# Patient Record
Sex: FEMALE | Race: WHITE | NOT HISPANIC OR LATINO | Employment: UNEMPLOYED | URBAN - METROPOLITAN AREA
[De-identification: names, ages, dates, MRNs, and addresses within clinical notes are randomized per-mention and may not be internally consistent; named-entity substitution may affect disease eponyms.]

---

## 2017-07-17 ENCOUNTER — HOSPITAL ENCOUNTER (EMERGENCY)
Facility: HOSPITAL | Age: 22
Discharge: HOME | End: 2017-07-17
Attending: EMERGENCY MEDICINE | Admitting: EMERGENCY MEDICINE

## 2017-07-17 ENCOUNTER — APPOINTMENT (OUTPATIENT)
Dept: CT IMAGING | Facility: HOSPITAL | Age: 22
End: 2017-07-17

## 2017-07-17 VITALS
OXYGEN SATURATION: 98 % | BODY MASS INDEX: 40.97 KG/M2 | HEIGHT: 64 IN | HEART RATE: 90 BPM | TEMPERATURE: 98.2 F | WEIGHT: 240 LBS | SYSTOLIC BLOOD PRESSURE: 146 MMHG | DIASTOLIC BLOOD PRESSURE: 73 MMHG | RESPIRATION RATE: 15 BRPM

## 2017-07-17 DIAGNOSIS — K52.9 COLITIS PRESUMED INFECTIOUS: Primary | ICD-10-CM

## 2017-07-17 LAB
ALBUMIN SERPL-MCNC: 4.4 G/DL (ref 3.5–5.2)
ALBUMIN/GLOB SERPL: 1.3 G/DL
ALP SERPL-CCNC: 56 U/L (ref 39–117)
ALT SERPL W P-5'-P-CCNC: 18 U/L (ref 1–33)
ANION GAP SERPL CALCULATED.3IONS-SCNC: 18.9 MMOL/L
AST SERPL-CCNC: 19 U/L (ref 1–32)
BASOPHILS # BLD AUTO: 0.01 10*3/MM3 (ref 0–0.2)
BASOPHILS NFR BLD AUTO: 0.1 % (ref 0–1.5)
BILIRUB SERPL-MCNC: 0.5 MG/DL (ref 0.1–1.2)
BUN BLD-MCNC: 12 MG/DL (ref 6–20)
BUN/CREAT SERPL: 16 (ref 7–25)
CALCIUM SPEC-SCNC: 9.7 MG/DL (ref 8.6–10.5)
CHLORIDE SERPL-SCNC: 106 MMOL/L (ref 98–107)
CO2 SERPL-SCNC: 16.1 MMOL/L (ref 22–29)
CREAT BLD-MCNC: 0.75 MG/DL (ref 0.57–1)
DEPRECATED RDW RBC AUTO: 41.3 FL (ref 37–54)
EOSINOPHIL # BLD AUTO: 0.04 10*3/MM3 (ref 0–0.7)
EOSINOPHIL NFR BLD AUTO: 0.3 % (ref 0.3–6.2)
ERYTHROCYTE [DISTWIDTH] IN BLOOD BY AUTOMATED COUNT: 14.6 % (ref 11.7–13)
GFR SERPL CREATININE-BSD FRML MDRD: 99 ML/MIN/1.73
GLOBULIN UR ELPH-MCNC: 3.3 GM/DL
GLUCOSE BLD-MCNC: 114 MG/DL (ref 65–99)
HCG SERPL QL: NEGATIVE
HCT VFR BLD AUTO: 40.8 % (ref 35.6–45.5)
HGB BLD-MCNC: 13.2 G/DL (ref 11.9–15.5)
HOLD SPECIMEN: NORMAL
HOLD SPECIMEN: NORMAL
IMM GRANULOCYTES # BLD: 0.03 10*3/MM3 (ref 0–0.03)
IMM GRANULOCYTES NFR BLD: 0.2 % (ref 0–0.5)
LIPASE SERPL-CCNC: 30 U/L (ref 13–60)
LYMPHOCYTES # BLD AUTO: 1.16 10*3/MM3 (ref 0.9–4.8)
LYMPHOCYTES NFR BLD AUTO: 7.3 % (ref 19.6–45.3)
MCH RBC QN AUTO: 25.4 PG (ref 26.9–32)
MCHC RBC AUTO-ENTMCNC: 32.4 G/DL (ref 32.4–36.3)
MCV RBC AUTO: 78.6 FL (ref 80.5–98.2)
MONOCYTES # BLD AUTO: 0.99 10*3/MM3 (ref 0.2–1.2)
MONOCYTES NFR BLD AUTO: 6.2 % (ref 5–12)
NEUTROPHILS # BLD AUTO: 13.66 10*3/MM3 (ref 1.9–8.1)
NEUTROPHILS NFR BLD AUTO: 85.9 % (ref 42.7–76)
PLATELET # BLD AUTO: 426 10*3/MM3 (ref 140–500)
PMV BLD AUTO: 11.2 FL (ref 6–12)
POTASSIUM BLD-SCNC: 3.7 MMOL/L (ref 3.5–5.2)
PROT SERPL-MCNC: 7.7 G/DL (ref 6–8.5)
RBC # BLD AUTO: 5.19 10*6/MM3 (ref 3.9–5.2)
SODIUM BLD-SCNC: 141 MMOL/L (ref 136–145)
WBC NRBC COR # BLD: 15.89 10*3/MM3 (ref 4.5–10.7)
WHOLE BLOOD HOLD SPECIMEN: NORMAL

## 2017-07-17 PROCEDURE — 25010000002 HYDROMORPHONE PER 4 MG: Performed by: EMERGENCY MEDICINE

## 2017-07-17 PROCEDURE — 25010000002 PROMETHAZINE PER 50 MG: Performed by: EMERGENCY MEDICINE

## 2017-07-17 PROCEDURE — 84703 CHORIONIC GONADOTROPIN ASSAY: CPT | Performed by: EMERGENCY MEDICINE

## 2017-07-17 PROCEDURE — 74177 CT ABD & PELVIS W/CONTRAST: CPT

## 2017-07-17 PROCEDURE — 96375 TX/PRO/DX INJ NEW DRUG ADDON: CPT

## 2017-07-17 PROCEDURE — 83690 ASSAY OF LIPASE: CPT | Performed by: EMERGENCY MEDICINE

## 2017-07-17 PROCEDURE — 96374 THER/PROPH/DIAG INJ IV PUSH: CPT

## 2017-07-17 PROCEDURE — 96361 HYDRATE IV INFUSION ADD-ON: CPT

## 2017-07-17 PROCEDURE — 0 IOPAMIDOL 61 % SOLUTION: Performed by: EMERGENCY MEDICINE

## 2017-07-17 PROCEDURE — 85025 COMPLETE CBC W/AUTO DIFF WBC: CPT | Performed by: EMERGENCY MEDICINE

## 2017-07-17 PROCEDURE — 25010000002 ONDANSETRON PER 1 MG

## 2017-07-17 PROCEDURE — 80053 COMPREHEN METABOLIC PANEL: CPT | Performed by: EMERGENCY MEDICINE

## 2017-07-17 PROCEDURE — 96376 TX/PRO/DX INJ SAME DRUG ADON: CPT

## 2017-07-17 PROCEDURE — 99284 EMERGENCY DEPT VISIT MOD MDM: CPT

## 2017-07-17 RX ORDER — PROMETHAZINE HYDROCHLORIDE 25 MG/1
25 TABLET ORAL EVERY 6 HOURS PRN
Qty: 20 TABLET | Refills: 0 | Status: SHIPPED | OUTPATIENT
Start: 2017-07-17 | End: 2018-06-27

## 2017-07-17 RX ORDER — PROMETHAZINE HYDROCHLORIDE 25 MG/ML
12.5 INJECTION, SOLUTION INTRAMUSCULAR; INTRAVENOUS ONCE
Status: COMPLETED | OUTPATIENT
Start: 2017-07-17 | End: 2017-07-17

## 2017-07-17 RX ORDER — ONDANSETRON 2 MG/ML
INJECTION INTRAMUSCULAR; INTRAVENOUS
Status: COMPLETED
Start: 2017-07-17 | End: 2017-07-17

## 2017-07-17 RX ORDER — ONDANSETRON 2 MG/ML
4 INJECTION INTRAMUSCULAR; INTRAVENOUS ONCE
Status: COMPLETED | OUTPATIENT
Start: 2017-07-17 | End: 2017-07-17

## 2017-07-17 RX ORDER — SODIUM CHLORIDE 0.9 % (FLUSH) 0.9 %
10 SYRINGE (ML) INJECTION AS NEEDED
Status: DISCONTINUED | OUTPATIENT
Start: 2017-07-17 | End: 2017-07-17 | Stop reason: HOSPADM

## 2017-07-17 RX ORDER — HYDROMORPHONE HYDROCHLORIDE 1 MG/ML
0.5 INJECTION, SOLUTION INTRAMUSCULAR; INTRAVENOUS; SUBCUTANEOUS ONCE
Status: COMPLETED | OUTPATIENT
Start: 2017-07-17 | End: 2017-07-17

## 2017-07-17 RX ORDER — METRONIDAZOLE 500 MG/1
500 TABLET ORAL 3 TIMES DAILY
Qty: 30 TABLET | Refills: 0 | Status: SHIPPED | OUTPATIENT
Start: 2017-07-17 | End: 2018-06-27

## 2017-07-17 RX ADMIN — IOPAMIDOL 85 ML: 612 INJECTION, SOLUTION INTRAVENOUS at 12:23

## 2017-07-17 RX ADMIN — ONDANSETRON 4 MG: 2 INJECTION INTRAMUSCULAR; INTRAVENOUS at 09:56

## 2017-07-17 RX ADMIN — ONDANSETRON 4 MG: 2 INJECTION INTRAMUSCULAR; INTRAVENOUS at 09:35

## 2017-07-17 RX ADMIN — PROMETHAZINE HYDROCHLORIDE 12.5 MG: 25 INJECTION INTRAMUSCULAR; INTRAVENOUS at 13:03

## 2017-07-17 RX ADMIN — PROMETHAZINE HYDROCHLORIDE 12.5 MG: 25 INJECTION INTRAMUSCULAR; INTRAVENOUS at 11:10

## 2017-07-17 RX ADMIN — SODIUM CHLORIDE 1000 ML: 9 INJECTION, SOLUTION INTRAVENOUS at 09:54

## 2017-07-17 RX ADMIN — HYDROMORPHONE HYDROCHLORIDE 0.5 MG: 1 INJECTION, SOLUTION INTRAMUSCULAR; INTRAVENOUS; SUBCUTANEOUS at 09:54

## 2017-07-17 NOTE — ED PROVIDER NOTES
" EMERGENCY DEPARTMENT ENCOUNTER    CHIEF COMPLAINT  Chief Complaint: N/V/D  History given by: Patient  History limited by: N/A  Room Number: 31/31  PMD: No Known Provider      HPI:  Pt is a 21 y.o. female who presents complaining of nausea and diarrhea onset 1 month ago. Pt reports vomiting onset last night and intermittent moderate epigastric abdominal pain. Pt describes the abdominal pain as \"cramping\". Pt reports a cough productive of clear phlegm and diarrhea x10 that increased last night. Pt denies fever, CP, SOA, and black/bloody stool.  Pt denies any sick contact. LMP 6/15/2017 Pt denies any chance of pregnancy.     Duration: 1 month, symptoms worsening last night  Onset: Gradual  Timing: Constant  Location: GI  Radiation: Does not radiate  Quality: \"N/V/D\"  Intensity/Severity: Moderate  Progression: Worsening  Associated Symptoms: Cough productive of clear phlegm, diarrhea x10, N/V  Aggravating Factors: None  Alleviating Factors: None  Previous Episodes: None  Treatment before arrival: None    PAST MEDICAL HISTORY  Active Ambulatory Problems     Diagnosis Date Noted   • PCOS (polycystic ovarian syndrome) 05/06/2016   • Hirsutism 05/06/2016   • Abnormal uterine bleeding (AUB) 05/06/2016   • Obesity 05/06/2016     Resolved Ambulatory Problems     Diagnosis Date Noted   • No Resolved Ambulatory Problems     Past Medical History:   Diagnosis Date   • Anemia    • Anxiety    • Asthma    • Depression    • Dizziness    • Multiple URI    • PCOS (polycystic ovarian syndrome) 5/6/2016   • Pneumonia    • Viral infection        PAST SURGICAL HISTORY  Past Surgical History:   Procedure Laterality Date   • VAGINAL SEPTUM RESECTION      Excision   • WISDOM TOOTH EXTRACTION  02/2016       FAMILY HISTORY  Family History   Problem Relation Age of Onset   • Anxiety disorder Mother    • Depression Mother    • Hypertension Maternal Grandmother    • Hyperlipidemia Maternal Grandmother    • Other Maternal Grandmother      GERD   • " "Breast cancer Neg Hx    • Ovarian cancer Neg Hx    • Uterine cancer Neg Hx    • Colon cancer Neg Hx        SOCIAL HISTORY  Social History     Social History   • Marital status: Single     Spouse name: N/A   • Number of children: N/A   • Years of education: N/A     Occupational History   • Not on file.     Social History Main Topics   • Smoking status: Never Smoker   • Smokeless tobacco: Never Used   • Alcohol use No   • Drug use: No   • Sexual activity: Defer     Other Topics Concern   • Not on file     Social History Narrative   • No narrative on file       ALLERGIES  Review of patient's allergies indicates no known allergies.    REVIEW OF SYSTEMS  Review of Systems   Constitutional: Negative.  Negative for unexpected weight change.   HENT: Negative.    Respiratory: Positive for cough (productive of clear phlegm). Negative for shortness of breath.    Cardiovascular: Negative.    Gastrointestinal: Positive for abdominal pain (\"cramping\", intermittent, epigastric), diarrhea (x10 in the past 24 hours), nausea and vomiting. Negative for blood in stool.   Genitourinary: Negative.    Musculoskeletal: Negative.    Neurological: Negative.    All other systems reviewed and are negative.      PHYSICAL EXAM  ED Triage Vitals   Temp Heart Rate Resp BP SpO2   07/17/17 0856 07/17/17 0856 07/17/17 0856 07/17/17 0929 07/17/17 0856   98.2 °F (36.8 °C) 113 20 143/90 95 %      Temp src Heart Rate Source Patient Position BP Location FiO2 (%)   07/17/17 0856 -- -- -- --   Tympanic           Physical Exam   Constitutional: She is oriented to person, place, and time and well-developed, well-nourished, and in no distress.   HENT:   Mouth/Throat: Mucous membranes are dry.   Eyes: EOM are normal.   Neck: Normal range of motion.   Cardiovascular: Normal rate and regular rhythm.    Pulmonary/Chest: Effort normal and breath sounds normal. No respiratory distress.   Abdominal: There is tenderness (upper).   Lymphadenopathy:     She has cervical " adenopathy (L sided).   Neurological: She is alert and oriented to person, place, and time. She has normal sensation and normal strength.   Skin: Skin is warm and dry.   Psychiatric: Affect normal.   Nursing note and vitals reviewed.      LAB RESULTS  Lab Results (last 24 hours)     Procedure Component Value Units Date/Time    CBC & Differential [[id number]] Collected:  07/17/17 0933    Specimen:  Blood Updated:  07/17/17 0947    Narrative:       The following orders were created for panel order CBC & Differential.  Procedure                               Abnormality         Status                     ---------                               -----------         ------                     CBC Auto Differential[[id number]]        Abnormal            Final result                 Please view results for these tests on the individual orders.    Comprehensive Metabolic Panel [[id number]]  (Abnormal) Collected:  07/17/17 0933    Specimen:  Blood Updated:  07/17/17 1005     Glucose 114 (H) mg/dL      BUN 12 mg/dL      Creatinine 0.75 mg/dL      Sodium 141 mmol/L      Potassium 3.7 mmol/L      Chloride 106 mmol/L      CO2 16.1 (L) mmol/L      Calcium 9.7 mg/dL      Total Protein 7.7 g/dL      Albumin 4.40 g/dL      ALT (SGPT) 18 U/L      AST (SGOT) 19 U/L      Alkaline Phosphatase 56 U/L      Total Bilirubin 0.5 mg/dL      eGFR Non African Amer 99 mL/min/1.73      Globulin 3.3 gm/dL      A/G Ratio 1.3 g/dL      BUN/Creatinine Ratio 16.0     Anion Gap 18.9 mmol/L     Lipase [[id number]]  (Normal) Collected:  07/17/17 0933    Specimen:  Blood Updated:  07/17/17 1005     Lipase 30 U/L     hCG, Serum, Qualitative [[id number]]  (Normal) Collected:  07/17/17 0933    Specimen:  Blood Updated:  07/17/17 1008     HCG Qualitative Negative    CBC Auto Differential [[id number]]  (Abnormal) Collected:  07/17/17 0933    Specimen:  Blood Updated:  07/17/17 0947     WBC 15.89 (H) 10*3/mm3      RBC 5.19 10*6/mm3      Hemoglobin 13.2 g/dL       Hematocrit 40.8 %      MCV 78.6 (L) fL      MCH 25.4 (L) pg      MCHC 32.4 g/dL      RDW 14.6 (H) %      RDW-SD 41.3 fl      MPV 11.2 fL      Platelets 426 10*3/mm3      Neutrophil % 85.9 (H) %      Lymphocyte % 7.3 (L) %      Monocyte % 6.2 %      Eosinophil % 0.3 %      Basophil % 0.1 %      Immature Grans % 0.2 %      Neutrophils, Absolute 13.66 (H) 10*3/mm3      Lymphocytes, Absolute 1.16 10*3/mm3      Monocytes, Absolute 0.99 10*3/mm3      Eosinophils, Absolute 0.04 10*3/mm3      Basophils, Absolute 0.01 10*3/mm3      Immature Grans, Absolute 0.03 10*3/mm3           I ordered the above labs and reviewed the results.    RADIOLOGY  CT Abdomen Pelvis With Contrast    (Results Pending)      CT Abdomen/Pelvis - shows enterocolitis of unclear etiology. Interpreted by radiologist and discussed with me.    I ordered the above noted radiological studies. Interpreted by radiologist. Discussed with radiologist (Dr. Olson at 1241). Reviewed by me in PACS.       PROCEDURES  Procedures      PROGRESS AND CONSULTS  ED Course   Comment By Time   9:49 AM  22 yo with NVD and abd cramps worse since last night.  Exam - mild TTP upper abd  Plan - IVF, IV DZ for pain/nausea. Labs. Axel Estevez MD 07/17 0950     9:45 AM:  Vitals: BP: 148/89 HR: 99 Temp: 98.2 °F (36.8 °C) (Tympanic) O2 sat: 100%  D/w pt plan for IV fluids for hydration, Zofran and Dilaudid for pain and nausea, and labs for further evaluation.    11:09 AM:  Vitals: BP: 127/75 HR: 80 Temp: 98.2 °F (36.8 °C) (Tympanic) O2 sat: 99%  Rechecked pt. Pt is resting comfortably. Discussed with pt plan for abdominal/pelvic CT for further evaluation.     12:45 PM:  Vitals: BP: 131/78 HR: 77 Temp: 98.2 °F (36.8 °C) (Tympanic) O2 sat: 100%  Rechecked pt. Pt is resting comfortably. Discussed with pt test results showing enterocolitis. Discussed with pt plan for discharge. Pt understands and agrees with the plan, all questions answered.      MEDICAL DECISION MAKING  Results were  reviewed/discussed with the patient and they were also made aware of online access. Pt also made aware that some labs, such as cultures, will not be resulted during ER visit and follow up with PMD is necessary.     MDM  Number of Diagnoses or Management Options     Amount and/or Complexity of Data Reviewed  Clinical lab tests: ordered and reviewed  Tests in the radiology section of CPT®: ordered and reviewed  Decide to obtain previous medical records or to obtain history from someone other than the patient: yes           DIAGNOSIS  Final diagnoses:   Colitis presumed infectious       DISPOSITION  1252- DISCHARGE    Patient discharged in stable condition.    Reviewed implications of results, diagnosis, meds, responsibility to follow up, warning signs and symptoms of possible worsening, potential complications and reasons to return to ER.    Patient/Family voiced understanding of above instructions.    Discussed plan for discharge, as there is no emergent indication for admission.  Pt/family is agreeable and understands need for follow up and repeat testing.  Pt is aware that discharge does not mean that nothing is wrong but it indicates no emergency is present that requires admission and they must continue care with follow-up as given below or physician of their choice.     FOLLOW-UP  ARIANA Franklin  [address] [place] [id number] [phone]               Medication List      New Prescriptions          metroNIDAZOLE 500 MG tablet   Commonly known as:  FLAGYL   Take 1 tablet by mouth 3 (Three) Times a Day.       promethazine 25 MG tablet   Commonly known as:  PHENERGAN   Take 1 tablet by mouth Every 6 (Six) Hours As Needed for Nausea or   Vomiting.         Stop          amitriptyline 10 MG tablet   Commonly known as:  ELAVIL       budesonide-formoterol 160-4.5 MCG/ACT inhaler   Commonly known as:  SYMBICORT       Cetirizine HCl 10 MG capsule       Fluticasone Furoate-Vilanterol 100-25 MCG/INH  aerosol powder        Iron 325 (65 FE) MG tablet       norgestimate-ethinyl estradiol 0.25-35 MG-MCG per tablet   Commonly known as:  ORTHO-CYCLEN       omeprazole 20 MG capsule   Commonly known as:  priLOSEC       ondansetron ODT 4 MG disintegrating tablet   Commonly known as:  ZOFRAN-ODT       Triamcinolone Acetonide 55 MCG/ACT nasal inhaler   Commonly known as:  NASACORT       Vitamin B-12 5000 MCG sublingual tablet       Vitamin C 500 MG capsule       vitamin D3 5000 UNITS tablet tablet           Latest Documented Vital Signs:  As of 12:53 PM  BP- 146/73 HR- 90 Temp- 98.2 °F (36.8 °C) (Tympanic) O2 sat- 98%    --  Documentation assistance provided by karrie Carrera for Axel Estevez MD.  Information recorded by the scribe was done at my direction and has been verified and validated by me.         Steve Carrera  07/17/17 4170       Axel Estevez MD  07/17/17 0577

## 2018-06-27 ENCOUNTER — OFFICE VISIT (OUTPATIENT)
Dept: FAMILY MEDICINE CLINIC | Facility: CLINIC | Age: 23
End: 2018-06-27

## 2018-06-27 VITALS
SYSTOLIC BLOOD PRESSURE: 142 MMHG | DIASTOLIC BLOOD PRESSURE: 80 MMHG | OXYGEN SATURATION: 96 % | BODY MASS INDEX: 33.39 KG/M2 | TEMPERATURE: 98.8 F | HEIGHT: 64 IN | HEART RATE: 94 BPM | WEIGHT: 195.6 LBS

## 2018-06-27 DIAGNOSIS — R19.7 DIARRHEA, UNSPECIFIED TYPE: ICD-10-CM

## 2018-06-27 DIAGNOSIS — R11.2 NON-INTRACTABLE VOMITING WITH NAUSEA, UNSPECIFIED VOMITING TYPE: ICD-10-CM

## 2018-06-27 DIAGNOSIS — R10.13 EPIGASTRIC PAIN: Primary | ICD-10-CM

## 2018-06-27 DIAGNOSIS — K92.0 HEMATEMESIS WITH NAUSEA: ICD-10-CM

## 2018-06-27 DIAGNOSIS — F41.8 DEPRESSION WITH ANXIETY: ICD-10-CM

## 2018-06-27 DIAGNOSIS — E28.2 PCOS (POLYCYSTIC OVARIAN SYNDROME): ICD-10-CM

## 2018-06-27 PROCEDURE — 99214 OFFICE O/P EST MOD 30 MIN: CPT | Performed by: PHYSICIAN ASSISTANT

## 2018-06-27 RX ORDER — ONDANSETRON 4 MG/1
4 TABLET, ORALLY DISINTEGRATING ORAL
COMMUNITY
Start: 2016-05-10 | End: 2018-06-27 | Stop reason: SDUPTHER

## 2018-06-27 RX ORDER — PAROXETINE 10 MG/1
10 TABLET, FILM COATED ORAL EVERY MORNING
Qty: 30 TABLET | Refills: 0 | Status: SHIPPED | OUTPATIENT
Start: 2018-06-27 | End: 2018-08-14 | Stop reason: SDUPTHER

## 2018-06-27 RX ORDER — CALCIUM CARBONATE 200(500)MG
1 TABLET,CHEWABLE ORAL DAILY
COMMUNITY
End: 2019-12-16

## 2018-06-27 NOTE — PROGRESS NOTES
"Subjective   Alicia Powell is a 22 y.o. female. Patient seen to establish care, vomiting for the last 3 days, anxiety     History of Present Illness     LIVING IN [place] WITH 2 BEST FRIENDS. WAS LIVING WITH SOMEONE ELSE AND THEY MOVED DUE TO INCOMPATIBILITY. WAS WORKING AT [employer] FOR ABOUT 6 MONTHS AND QUIT WHEN MOVED.     STARTED ABOUT 3 DAYS AGO- WENT TO LUNCH AND A FEW HOURS LATER, INTRACTABLE VOMITING AND BURNING PAIN IN EPIGASTRIC AREA UP MID CHEST. FEELING THAT NEEDED TO VOMIT BUT NOTHING LEFT. GAVE IV FLUIDS AND FELT BETTER BUT ON THE WAY HOME YESTERDAY, INCREASED PAIN AND VOMITING AND THROUGH THE NIGHT THE SAME. TOOK A COUPLE PROTONIX BUT WAS SCARED WAS INDUCING VOMITING. WAS TAKING AND STOPPED AND HAS BEEN TAKING ZOFRAN. VOMITING WITH BLEEDING \"TOWARDS THE END\"- CLOTS OF BLOOD. LAST WAS LAST NIGHT BUT WAS ABLE TO GET VOMITING STOPPED. LAST VOMITING WAS 6 AM.     X 2 YEARS- HAS BEEN HAVING EPISODES OF VOMITING AND DIARRHEA. CALLED IN TO WORK FOR IT. HAS BEEN TO ER MULTIPLE TIMES FOR SAME COMPLAINTS. WAS TO SEE DR PETERS BUT DID NOT GO. INSURANCE LAPSED.     HAD DIARRHEA INITIALLY BUT NOT ANY LONGER.   FELT HOT BUT NO DOCUMENTED FEVER. LAST BM EARLY THIS AM- WAS NORMAL BUT DECREASED AMOUNT.     INCREASED URINARY FREQUENCY LAST NIGHT WITH INCREASING FLUIDS- ALSO DRINKING PEDIALYTE. NO DYSURIA. NO POST VOID URGENCY. NO BLOOD IN URINE. NO VAGINAL D/C, ITCHING ODOR. SA- 4 PARTNERS LIFETIME. 1 PARTNER CURRENTLY X 6 YEARS. HAD OTHER PARTNERS FOR MONTHS IN BETWEEN. CONDOM USE EVERY TIME. NO PAIN OR BLEEDING WITH INTERCOURSE.     POSSIBLE CONTRACEPTION COUNSELING. HAS APPT WITH [facility]. NOT SURE WHAT SHE WOULD LIKE TO DO. LAST TIME, THEY SUSPECTED PCOS- HAS STARTED NOTICING SYMPTOMS. HAS STARTED HAIR GROWTH CHEST AND CHIN. MENSES HAVE BEEN NORMAL TIMING. WEIGHT LOSS - 65 LBS TOTAL. WAS TRYING BUT FOR SOME OF IT BUT HAS HAD INCREASED STRESS AND DEPRESSION AND ANXIETY. FATHER  AND HAS BEEN " DIFFICULT.     WAS ON ZOLOFT AT AGE 16- HOSPITALIZED AT [facility] FOR HARMFUL THOUGHTS. WAS HOSPITALIZED FOR 1 WEEK. FELT LIKE IT MADE EVERYTHING WORSE. MADE WANT TO DIE MORE. NO OTHER TREATMENT FOR ANXIETY OR DEPRESSION.     HAS UPS AND DOWNS- HAS BEEN LOOKING INTO BORDERLINE PERSONALITY DISORDER. VIOLENT RAGE EPISODES- CAN BE OK AND 1 THING WILL SET OFF AND IS A DIFFERENT THINGS. RECOGNIZES DURING OR AFTER. NOT SURE HOW TO FIX IT. IS OCCURRING EVERY DAY. SINCE FATHER'S DEATH, HAS BEEN WORSE. DEPRESSION HAS GOTTEN WORSE. SUICIDAL THOUGHTS- STARTED RIGHT AFTER FATHER'S DEATH. FRIEND THAT LIVES WITH HER HAS HELPED KEEP SAFE. IS A REALLY GOOD PERSON. HAS VERY GRUESOME VISUAL IMAGES, SLAMMING HEAD INTO PAVEMENT, CUTTING WITH DULL OBJECTS- LAST WAS 2-3 MONTHS AGO. HAS TRIED TO HARM SELF WITH KNIVES. HAD IT READY BUT DIDN'T FOLLOW THROUGH. THOUGHT ABOUT DRINKING BLEACH AND WAS CONCERNED ABOUT WHAT IT WOULD DO TO STOMACH AND ESOPHAGUS.     INCREASED CRYING. CRIES OUT OF NOWHERE.     The following portions of the patient's history were reviewed and updated as appropriate: allergies, current medications, past family history, past medical history, past social history, past surgical history and problem list.    Review of Systems   Gastrointestinal: Positive for vomiting.   Psychiatric/Behavioral:        Anxiety    All other systems reviewed and are negative.      Objective   Physical Exam   Constitutional: She is oriented to person, place, and time. She appears well-developed and well-nourished.   HENT:   Head: Normocephalic and atraumatic.   Right Ear: External ear normal.   Left Ear: External ear normal.   Nose: Nose normal.   Eyes: Conjunctivae are normal.   Cardiovascular: Normal rate, regular rhythm, normal heart sounds and intact distal pulses.  Exam reveals no gallop and no friction rub.    No murmur heard.  Pulmonary/Chest: Effort normal and breath sounds normal. No respiratory distress. She has no wheezes. She has no  rhonchi. She has no rales.   Abdominal: Soft. Normal appearance and bowel sounds are normal. She exhibits no shifting dullness, no distension, no abdominal bruit and no mass. There is no hepatosplenomegaly. There is tenderness in the epigastric area. There is no rigidity, no rebound, no guarding and no CVA tenderness. No hernia.   Neurological: She is alert and oriented to person, place, and time.   Skin: Skin is warm and dry. She is not diaphoretic.   Psychiatric: She has a normal mood and affect. Her behavior is normal. Judgment and thought content normal.   Nursing note and vitals reviewed.      Assessment/Plan   Alicia was seen today for establish care, vomiting and anxiety.    Diagnoses and all orders for this visit:    Epigastric pain  -     CBC & Differential  -     Comprehensive Metabolic Panel  -     Amylase  -     Lipase  -     Hepatitis Panel, Acute  -     Thyroid Panel With TSH  -     Iron and TIBC  -     Ferritin  -     Helicobacter Pylori, IgA IgG IgM  -     US Gallbladder  -     Ambulatory Referral to Gastroenterology    Non-intractable vomiting with nausea, unspecified vomiting type  -     CBC & Differential  -     Comprehensive Metabolic Panel  -     Amylase  -     Lipase  -     Hepatitis Panel, Acute  -     Thyroid Panel With TSH  -     Iron and TIBC  -     Ferritin  -     Helicobacter Pylori, IgA IgG IgM  -     US Gallbladder  -     Ambulatory Referral to Gastroenterology    Diarrhea, unspecified type  -     CBC & Differential  -     Comprehensive Metabolic Panel  -     Amylase  -     Lipase  -     Hepatitis Panel, Acute  -     Thyroid Panel With TSH  -     Iron and TIBC  -     Ferritin  -     Helicobacter Pylori, IgA IgG IgM  -     US Gallbladder  -     Ambulatory Referral to Gastroenterology    Hematemesis with nausea  -     CBC & Differential  -     Comprehensive Metabolic Panel  -     Amylase  -     Lipase  -     Hepatitis Panel, Acute  -     Thyroid Panel With TSH  -     Iron and TIBC  -      Ferritin  -     Helicobacter Pylori, IgA IgG IgM  -     US Gallbladder  -     Ambulatory Referral to Gastroenterology    PCOS (polycystic ovarian syndrome)    Depression with anxiety  -     Thyroid Panel With TSH  -     PARoxetine (PAXIL) 10 MG tablet; Take 1 tablet by mouth Every Morning.  -     Ambulatory Referral to Psychology      Patient Instructions   22 YEAR OLD FEMALE WHO PRESENTS TODAY TO RE-ESTABLISH CARE. I HAVE NOT SEEN PATIENT SINCE 2014. SHE REPORTS SHE DID NOT SEE GI WHEN REFERRED IN THE PAST. PATIENT AND MOTHER REPORT SHE HAS CONTINUED WITH INTERMITTENT ABDOMINAL PAIN, VOMITING AND DIARRHEA. SHE REPORTS MOST RECENT EPISODE STARTED 3 DAYS AGO WITH SEVERE ABDOMINAL PAIN AND VOMITING A COUPLE HOURS AFTER EATING. SHE WAS SEEN AT [facility] ER AND HAD NEGATIVE CT ABD/PELVIS. SHE WAS D/C WITH ZOFRAN ODT, PHENERGAN SUPPOSITORIES, AND PROTONIX. SHE HAS CONTINUED WITH INTERMITTENT SYMPTOMS X 3 DAYS. PATIENT WITH VOMITING AND ABDOMINAL PAIN TODAY. SHE HAS EPIGASTRIC TENDERNESS ON PALPATION BUT NO OTHER SIGNIFICANT TENDERNESS. I WILL CHECK LABS TODAY AND REFER FOR GB US WITH PAIN STARTING A FEW HOURS AFTER EATING. I ADVISED TO RESTART PROTONIX 40 MG ONCE DAILY. TO ER ASAP IF WORSENING SYMPTOMS PRIOR TO RESULTS OF LABS AND IMAGING.     SHE HAS ALSO HAD SIGNIFICANT DEPRESSION AND ANXIETY AND HAS NOT BEEN TREATED. SHE REPORTS SYMPTOMS WORSENED MONTHS AGO AFTER HER FATHER'S DEATH. SHE HAS HAD SUICIDAL THOUGHTS BUT REPORTS SHE HAS A GOOD SUPPORT SYSTEM AND DOES NOT HAVE CURRENT PLAN OR INTENTION TO HARM HERSELF. SHE HAS HAD PLAN IN THE PAST. PATIENT REPORTS SHE DID NOT DO WELL ON ZOLOFT AS A TEEN, REPORTING IT WORSENED HER DEPRESSIVE SYMPTOMS. HER MOTHER AND GRANDMOTHER DID WELL ON PAXIL IN THE PAST. I HAVE DISCUSSED THE NEED FOR COUNSELING- SHE WOULD BE BEST TREATED WITH IOP AT THE [place] OR [facility]. SHE IS WILLING TO GO FOR INTAKE TO GET INTO INTENSIVE PROGRAM. HOWEVER, SHE WOULD LIKE REFERRAL TO PSYCHOLOGY. I  WILL REFER TODAY. I WILL HAVE HER START PAXIL 10 MG ONCE DAILY. PATIENT VERBALLY CONTRACTS FOR SAFETY- ENSURING TO CALL 9-1-1 TO ER IF DEVELOPS SI/HI, OR TO BE SEEN ASAP IF WORSENING MOODS. PATIENT AND MOTHER VERBALIZED UNDERSTANDING.     SHE NEEDS TO SEE GYN FOR CONTRACEPTION. SHE HAD QUESTIONABLE PCOS AND HAS HIRSUTISM AND PMDD. I WILL REFER TO GYN.     FOLLOW UP WITH ME IN 2-4 WEEKS FOR RE-EVALUATION OF MOODS.

## 2018-06-28 ENCOUNTER — APPOINTMENT (OUTPATIENT)
Dept: CT IMAGING | Facility: HOSPITAL | Age: 23
End: 2018-06-28

## 2018-06-28 ENCOUNTER — HOSPITAL ENCOUNTER (EMERGENCY)
Facility: HOSPITAL | Age: 23
Discharge: HOME | End: 2018-06-29
Attending: EMERGENCY MEDICINE | Admitting: EMERGENCY MEDICINE

## 2018-06-28 DIAGNOSIS — N39.0 ACUTE UTI: Primary | ICD-10-CM

## 2018-06-28 LAB
ALBUMIN SERPL-MCNC: 4.7 G/DL (ref 3.5–5.2)
ALBUMIN SERPL-MCNC: 4.8 G/DL (ref 3.5–5.2)
ALBUMIN/GLOB SERPL: 1.7 G/DL
ALBUMIN/GLOB SERPL: 1.8 G/DL
ALP SERPL-CCNC: 55 U/L (ref 39–117)
ALP SERPL-CCNC: 56 U/L (ref 39–117)
ALT SERPL W P-5'-P-CCNC: 13 U/L (ref 1–33)
ALT SERPL-CCNC: 13 U/L (ref 1–33)
AMYLASE SERPL-CCNC: 232 U/L (ref 28–100)
ANION GAP SERPL CALCULATED.3IONS-SCNC: 16.4 MMOL/L
AST SERPL-CCNC: 10 U/L (ref 1–32)
AST SERPL-CCNC: 14 U/L (ref 1–32)
BACTERIA UR QL AUTO: ABNORMAL /HPF
BASOPHILS # BLD AUTO: 0.02 10*3/MM3 (ref 0–0.2)
BASOPHILS # BLD AUTO: 0.03 10*3/MM3 (ref 0–0.2)
BASOPHILS NFR BLD AUTO: 0.2 % (ref 0–1.5)
BASOPHILS NFR BLD AUTO: 0.2 % (ref 0–1.5)
BILIRUB SERPL-MCNC: 0.5 MG/DL (ref 0.1–1.2)
BILIRUB SERPL-MCNC: 0.6 MG/DL (ref 0.1–1.2)
BILIRUB UR QL STRIP: NEGATIVE
BUN BLD-MCNC: 11 MG/DL (ref 6–20)
BUN SERPL-MCNC: 9 MG/DL (ref 6–20)
BUN/CREAT SERPL: 11.4 (ref 7–25)
BUN/CREAT SERPL: 12.8 (ref 7–25)
CALCIUM SERPL-MCNC: 10.2 MG/DL (ref 8.6–10.5)
CALCIUM SPEC-SCNC: 9.7 MG/DL (ref 8.6–10.5)
CHLORIDE SERPL-SCNC: 101 MMOL/L (ref 98–107)
CHLORIDE SERPL-SCNC: 99 MMOL/L (ref 98–107)
CLARITY UR: ABNORMAL
CO2 SERPL-SCNC: 20.6 MMOL/L (ref 22–29)
CO2 SERPL-SCNC: 24.5 MMOL/L (ref 22–29)
COLOR UR: YELLOW
CREAT BLD-MCNC: 0.86 MG/DL (ref 0.57–1)
CREAT SERPL-MCNC: 0.79 MG/DL (ref 0.57–1)
D-LACTATE SERPL-SCNC: 1.6 MMOL/L (ref 0.5–2)
DEPRECATED RDW RBC AUTO: 41.6 FL (ref 37–54)
EOSINOPHIL # BLD AUTO: 0.01 10*3/MM3 (ref 0–0.7)
EOSINOPHIL # BLD AUTO: 0.07 10*3/MM3 (ref 0–0.7)
EOSINOPHIL NFR BLD AUTO: 0.1 % (ref 0.3–6.2)
EOSINOPHIL NFR BLD AUTO: 0.5 % (ref 0.3–6.2)
ERYTHROCYTE [DISTWIDTH] IN BLOOD BY AUTOMATED COUNT: 15.2 % (ref 11.7–13)
ERYTHROCYTE [DISTWIDTH] IN BLOOD BY AUTOMATED COUNT: 15.8 % (ref 11.7–13)
FERRITIN SERPL-MCNC: 4.59 NG/ML (ref 13–150)
FT4I SERPL CALC-MCNC: 2.3 (ref 1.2–4.9)
GFR SERPL CREATININE-BSD FRML MDRD: 83 ML/MIN/1.73
GLOBULIN SER CALC-MCNC: 2.7 GM/DL
GLOBULIN UR ELPH-MCNC: 2.8 GM/DL
GLUCOSE BLD-MCNC: 100 MG/DL (ref 65–99)
GLUCOSE SERPL-MCNC: 84 MG/DL (ref 65–99)
GLUCOSE UR STRIP-MCNC: NEGATIVE MG/DL
H PYLORI IGA SER-ACNC: <9 UNITS (ref 0–8.9)
H PYLORI IGG SER IA-ACNC: 0.55 INDEX VALUE (ref 0–0.79)
H PYLORI IGM SER-ACNC: <9 UNITS (ref 0–8.9)
HAV IGM SERPL QL IA: NEGATIVE
HBV CORE IGM SERPL QL IA: NEGATIVE
HBV SURFACE AG SERPL QL IA: NEGATIVE
HCG SERPL QL: NEGATIVE
HCT VFR BLD AUTO: 38.8 % (ref 35.6–45.5)
HCT VFR BLD AUTO: 39.9 % (ref 35.6–45.5)
HCV AB S/CO SERPL IA: 0.1 S/CO RATIO (ref 0–0.9)
HGB BLD-MCNC: 12.1 G/DL (ref 11.9–15.5)
HGB BLD-MCNC: 12.6 G/DL (ref 11.9–15.5)
HGB UR QL STRIP.AUTO: NEGATIVE
HOLD SPECIMEN: NORMAL
HOLD SPECIMEN: NORMAL
HYALINE CASTS UR QL AUTO: ABNORMAL /LPF
IMM GRANULOCYTES # BLD: 0.03 10*3/MM3 (ref 0–0.03)
IMM GRANULOCYTES # BLD: 0.03 10*3/MM3 (ref 0–0.03)
IMM GRANULOCYTES NFR BLD: 0.2 % (ref 0–0.5)
IMM GRANULOCYTES NFR BLD: 0.2 % (ref 0–0.5)
INR PPP: 1.1 (ref 0.9–1.1)
IRON SATN MFR SERPL: 4 % (ref 20–50)
IRON SERPL-MCNC: 21 MCG/DL (ref 37–145)
KETONES UR QL STRIP: ABNORMAL
LEUKOCYTE ESTERASE UR QL STRIP.AUTO: ABNORMAL
LIPASE SERPL-CCNC: 246 U/L (ref 13–60)
LIPASE SERPL-CCNC: 46 U/L (ref 13–60)
LYMPHOCYTES # BLD AUTO: 1.44 10*3/MM3 (ref 0.9–4.8)
LYMPHOCYTES # BLD AUTO: 3.84 10*3/MM3 (ref 0.9–4.8)
LYMPHOCYTES NFR BLD AUTO: 11.1 % (ref 19.6–45.3)
LYMPHOCYTES NFR BLD AUTO: 30.1 % (ref 19.6–45.3)
MCH RBC QN AUTO: 23.7 PG (ref 26.9–32)
MCH RBC QN AUTO: 23.9 PG (ref 26.9–32)
MCHC RBC AUTO-ENTMCNC: 31.2 G/DL (ref 32.4–36.3)
MCHC RBC AUTO-ENTMCNC: 31.6 G/DL (ref 32.4–36.3)
MCV RBC AUTO: 75.1 FL (ref 80.5–98.2)
MCV RBC AUTO: 76.7 FL (ref 80.5–98.2)
MONOCYTES # BLD AUTO: 0.6 10*3/MM3 (ref 0.2–1.2)
MONOCYTES # BLD AUTO: 1.05 10*3/MM3 (ref 0.2–1.2)
MONOCYTES NFR BLD AUTO: 4.6 % (ref 5–12)
MONOCYTES NFR BLD AUTO: 8.2 % (ref 5–12)
MUCOUS THREADS URNS QL MICRO: ABNORMAL /HPF
NEUTROPHILS # BLD AUTO: 10.94 10*3/MM3 (ref 1.9–8.1)
NEUTROPHILS # BLD AUTO: 7.78 10*3/MM3 (ref 1.9–8.1)
NEUTROPHILS NFR BLD AUTO: 61 % (ref 42.7–76)
NEUTROPHILS NFR BLD AUTO: 84 % (ref 42.7–76)
NITRITE UR QL STRIP: NEGATIVE
NRBC BLD MANUAL-RTO: 0 /100 WBC (ref 0–0)
PH UR STRIP.AUTO: 8.5 [PH] (ref 5–8)
PLATELET # BLD AUTO: 477 10*3/MM3 (ref 140–500)
PLATELET # BLD AUTO: 478 10*3/MM3 (ref 140–500)
PMV BLD AUTO: 11.4 FL (ref 6–12)
POTASSIUM BLD-SCNC: 3.7 MMOL/L (ref 3.5–5.2)
POTASSIUM SERPL-SCNC: 3.7 MMOL/L (ref 3.5–5.2)
PROT SERPL-MCNC: 7.5 G/DL (ref 6–8.5)
PROT SERPL-MCNC: 7.5 G/DL (ref 6–8.5)
PROT UR QL STRIP: ABNORMAL
PROTHROMBIN TIME: 14 SECONDS (ref 11.7–14.2)
RBC # BLD AUTO: 5.06 10*6/MM3 (ref 3.9–5.2)
RBC # BLD AUTO: 5.31 10*6/MM3 (ref 3.9–5.2)
RBC # UR: ABNORMAL /HPF
REF LAB TEST METHOD: ABNORMAL
SODIUM BLD-SCNC: 138 MMOL/L (ref 136–145)
SODIUM SERPL-SCNC: 139 MMOL/L (ref 136–145)
SP GR UR STRIP: >=1.03 (ref 1–1.03)
SQUAMOUS #/AREA URNS HPF: ABNORMAL /HPF
T3RU NFR SERPL: 29 % (ref 24–39)
T4 SERPL-MCNC: 7.9 UG/DL (ref 4.5–12)
TIBC SERPL-MCNC: 477 MCG/DL
TSH SERPL DL<=0.005 MIU/L-ACNC: 1.86 UIU/ML (ref 0.45–4.5)
UIBC SERPL-MCNC: 456 MCG/DL
UROBILINOGEN UR QL STRIP: ABNORMAL
WBC # BLD AUTO: 12.76 10*3/MM3 (ref 4.5–10.7)
WBC NRBC COR # BLD: 13.02 10*3/MM3 (ref 4.5–10.7)
WBC UR QL AUTO: ABNORMAL /HPF
WHOLE BLOOD HOLD SPECIMEN: NORMAL
WHOLE BLOOD HOLD SPECIMEN: NORMAL

## 2018-06-28 PROCEDURE — 25010000002 ONDANSETRON PER 1 MG: Performed by: EMERGENCY MEDICINE

## 2018-06-28 PROCEDURE — 96361 HYDRATE IV INFUSION ADD-ON: CPT

## 2018-06-28 PROCEDURE — 84703 CHORIONIC GONADOTROPIN ASSAY: CPT | Performed by: EMERGENCY MEDICINE

## 2018-06-28 PROCEDURE — 96375 TX/PRO/DX INJ NEW DRUG ADDON: CPT

## 2018-06-28 PROCEDURE — 25010000002 HYDROMORPHONE PER 4 MG: Performed by: EMERGENCY MEDICINE

## 2018-06-28 PROCEDURE — 83605 ASSAY OF LACTIC ACID: CPT | Performed by: EMERGENCY MEDICINE

## 2018-06-28 PROCEDURE — 25010000002 IOPAMIDOL 61 % SOLUTION: Performed by: EMERGENCY MEDICINE

## 2018-06-28 PROCEDURE — 25010000002 CEFTRIAXONE PER 250 MG: Performed by: EMERGENCY MEDICINE

## 2018-06-28 PROCEDURE — 74177 CT ABD & PELVIS W/CONTRAST: CPT

## 2018-06-28 PROCEDURE — 96365 THER/PROPH/DIAG IV INF INIT: CPT

## 2018-06-28 PROCEDURE — 80053 COMPREHEN METABOLIC PANEL: CPT | Performed by: EMERGENCY MEDICINE

## 2018-06-28 PROCEDURE — 83690 ASSAY OF LIPASE: CPT | Performed by: EMERGENCY MEDICINE

## 2018-06-28 PROCEDURE — 85610 PROTHROMBIN TIME: CPT | Performed by: EMERGENCY MEDICINE

## 2018-06-28 PROCEDURE — 85025 COMPLETE CBC W/AUTO DIFF WBC: CPT | Performed by: EMERGENCY MEDICINE

## 2018-06-28 PROCEDURE — 99284 EMERGENCY DEPT VISIT MOD MDM: CPT

## 2018-06-28 PROCEDURE — 81001 URINALYSIS AUTO W/SCOPE: CPT | Performed by: EMERGENCY MEDICINE

## 2018-06-28 RX ORDER — PANTOPRAZOLE SODIUM 40 MG/1
40 TABLET, DELAYED RELEASE ORAL DAILY
COMMUNITY
End: 2018-08-17 | Stop reason: SDUPTHER

## 2018-06-28 RX ORDER — CEFTRIAXONE SODIUM 1 G/50ML
1 INJECTION, SOLUTION INTRAVENOUS ONCE
Status: COMPLETED | OUTPATIENT
Start: 2018-06-28 | End: 2018-06-29

## 2018-06-28 RX ORDER — SODIUM CHLORIDE 0.9 % (FLUSH) 0.9 %
10 SYRINGE (ML) INJECTION AS NEEDED
Status: DISCONTINUED | OUTPATIENT
Start: 2018-06-28 | End: 2018-06-29 | Stop reason: HOSPADM

## 2018-06-28 RX ORDER — ONDANSETRON 2 MG/ML
4 INJECTION INTRAMUSCULAR; INTRAVENOUS ONCE
Status: COMPLETED | OUTPATIENT
Start: 2018-06-28 | End: 2018-06-28

## 2018-06-28 RX ADMIN — SODIUM CHLORIDE 2667 ML: 9 INJECTION, SOLUTION INTRAVENOUS at 21:10

## 2018-06-28 RX ADMIN — IOPAMIDOL 85 ML: 612 INJECTION, SOLUTION INTRAVENOUS at 22:45

## 2018-06-28 RX ADMIN — ONDANSETRON 4 MG: 2 INJECTION, SOLUTION INTRAMUSCULAR; INTRAVENOUS at 21:10

## 2018-06-28 RX ADMIN — HYDROMORPHONE HYDROCHLORIDE 0.5 MG: 1 INJECTION, SOLUTION INTRAMUSCULAR; INTRAVENOUS; SUBCUTANEOUS at 21:10

## 2018-06-28 RX ADMIN — CEFTRIAXONE SODIUM 1 G: 1 INJECTION, SOLUTION INTRAVENOUS at 23:46

## 2018-06-29 VITALS
OXYGEN SATURATION: 99 % | HEART RATE: 88 BPM | WEIGHT: 196 LBS | TEMPERATURE: 97.8 F | BODY MASS INDEX: 33.46 KG/M2 | HEIGHT: 64 IN | RESPIRATION RATE: 16 BRPM | DIASTOLIC BLOOD PRESSURE: 70 MMHG | SYSTOLIC BLOOD PRESSURE: 128 MMHG

## 2018-06-29 RX ORDER — ONDANSETRON 4 MG/1
4 TABLET, FILM COATED ORAL EVERY 6 HOURS
Qty: 20 TABLET | Refills: 0 | Status: SHIPPED | OUTPATIENT
Start: 2018-06-29 | End: 2018-09-18

## 2018-06-29 RX ORDER — HYDROCODONE BITARTRATE AND ACETAMINOPHEN 5; 325 MG/1; MG/1
1 TABLET ORAL EVERY 6 HOURS PRN
Qty: 20 TABLET | Refills: 0 | Status: SHIPPED | OUTPATIENT
Start: 2018-06-29 | End: 2018-07-31

## 2018-06-29 RX ORDER — SULFAMETHOXAZOLE AND TRIMETHOPRIM 800; 160 MG/1; MG/1
1 TABLET ORAL 2 TIMES DAILY
Qty: 28 TABLET | Refills: 0 | Status: SHIPPED | OUTPATIENT
Start: 2018-06-29 | End: 2018-07-31

## 2018-06-30 ENCOUNTER — APPOINTMENT (OUTPATIENT)
Dept: ULTRASOUND IMAGING | Facility: HOSPITAL | Age: 23
End: 2018-06-30

## 2018-06-30 ENCOUNTER — HOSPITAL ENCOUNTER (EMERGENCY)
Facility: HOSPITAL | Age: 23
Discharge: HOME | End: 2018-06-30
Attending: EMERGENCY MEDICINE | Admitting: EMERGENCY MEDICINE

## 2018-06-30 VITALS
DIASTOLIC BLOOD PRESSURE: 84 MMHG | RESPIRATION RATE: 16 BRPM | HEIGHT: 64 IN | WEIGHT: 194 LBS | BODY MASS INDEX: 33.12 KG/M2 | TEMPERATURE: 98.2 F | HEART RATE: 98 BPM | SYSTOLIC BLOOD PRESSURE: 135 MMHG | OXYGEN SATURATION: 99 %

## 2018-06-30 DIAGNOSIS — R10.9 ABDOMINAL PAIN, UNSPECIFIED ABDOMINAL LOCATION: ICD-10-CM

## 2018-06-30 DIAGNOSIS — R11.2 NAUSEA AND VOMITING, INTRACTABILITY OF VOMITING NOT SPECIFIED, UNSPECIFIED VOMITING TYPE: Primary | ICD-10-CM

## 2018-06-30 LAB
ALBUMIN SERPL-MCNC: 4.5 G/DL (ref 3.5–5.2)
ALBUMIN/GLOB SERPL: 1.8 G/DL
ALP SERPL-CCNC: 50 U/L (ref 39–117)
ALT SERPL W P-5'-P-CCNC: 14 U/L (ref 1–33)
AMPHET+METHAMPHET UR QL: NEGATIVE
ANION GAP SERPL CALCULATED.3IONS-SCNC: 18.3 MMOL/L
AST SERPL-CCNC: 13 U/L (ref 1–32)
BARBITURATES UR QL SCN: NEGATIVE
BASOPHILS # BLD AUTO: 0.03 10*3/MM3 (ref 0–0.2)
BASOPHILS NFR BLD AUTO: 0.2 % (ref 0–1.5)
BENZODIAZ UR QL SCN: NEGATIVE
BILIRUB SERPL-MCNC: 0.4 MG/DL (ref 0.1–1.2)
BILIRUB UR QL STRIP: NEGATIVE
BUN BLD-MCNC: 8 MG/DL (ref 6–20)
BUN/CREAT SERPL: 11.6 (ref 7–25)
CALCIUM SPEC-SCNC: 9.5 MG/DL (ref 8.6–10.5)
CANNABINOIDS SERPL QL: POSITIVE
CHLORIDE SERPL-SCNC: 103 MMOL/L (ref 98–107)
CLARITY UR: CLEAR
CO2 SERPL-SCNC: 19.7 MMOL/L (ref 22–29)
COCAINE UR QL: NEGATIVE
COLOR UR: YELLOW
CREAT BLD-MCNC: 0.69 MG/DL (ref 0.57–1)
DEPRECATED RDW RBC AUTO: 42.9 FL (ref 37–54)
EOSINOPHIL # BLD AUTO: 0.33 10*3/MM3 (ref 0–0.7)
EOSINOPHIL NFR BLD AUTO: 2.5 % (ref 0.3–6.2)
ERYTHROCYTE [DISTWIDTH] IN BLOOD BY AUTOMATED COUNT: 15.4 % (ref 11.7–13)
GFR SERPL CREATININE-BSD FRML MDRD: 107 ML/MIN/1.73
GLOBULIN UR ELPH-MCNC: 2.5 GM/DL
GLUCOSE BLD-MCNC: 83 MG/DL (ref 65–99)
GLUCOSE UR STRIP-MCNC: NEGATIVE MG/DL
HCG SERPL QL: NEGATIVE
HCT VFR BLD AUTO: 37.1 % (ref 35.6–45.5)
HGB BLD-MCNC: 11.5 G/DL (ref 11.9–15.5)
HGB UR QL STRIP.AUTO: NEGATIVE
HOLD SPECIMEN: NORMAL
HOLD SPECIMEN: NORMAL
IMM GRANULOCYTES # BLD: 0.03 10*3/MM3 (ref 0–0.03)
IMM GRANULOCYTES NFR BLD: 0.2 % (ref 0–0.5)
KETONES UR QL STRIP: ABNORMAL
LEUKOCYTE ESTERASE UR QL STRIP.AUTO: NEGATIVE
LIPASE SERPL-CCNC: 57 U/L (ref 13–60)
LYMPHOCYTES # BLD AUTO: 4.84 10*3/MM3 (ref 0.9–4.8)
LYMPHOCYTES NFR BLD AUTO: 36.7 % (ref 19.6–45.3)
MCH RBC QN AUTO: 23.9 PG (ref 26.9–32)
MCHC RBC AUTO-ENTMCNC: 31 G/DL (ref 32.4–36.3)
MCV RBC AUTO: 77 FL (ref 80.5–98.2)
METHADONE UR QL SCN: NEGATIVE
MONOCYTES # BLD AUTO: 1.01 10*3/MM3 (ref 0.2–1.2)
MONOCYTES NFR BLD AUTO: 7.7 % (ref 5–12)
NEUTROPHILS # BLD AUTO: 6.99 10*3/MM3 (ref 1.9–8.1)
NEUTROPHILS NFR BLD AUTO: 52.9 % (ref 42.7–76)
NITRITE UR QL STRIP: NEGATIVE
OPIATES UR QL: NEGATIVE
OXYCODONE UR QL SCN: NEGATIVE
PH UR STRIP.AUTO: 7.5 [PH] (ref 5–8)
PLATELET # BLD AUTO: 491 10*3/MM3 (ref 140–500)
PMV BLD AUTO: 11.5 FL (ref 6–12)
POTASSIUM BLD-SCNC: 3.3 MMOL/L (ref 3.5–5.2)
PROT SERPL-MCNC: 7 G/DL (ref 6–8.5)
PROT UR QL STRIP: NEGATIVE
RBC # BLD AUTO: 4.82 10*6/MM3 (ref 3.9–5.2)
SODIUM BLD-SCNC: 141 MMOL/L (ref 136–145)
SP GR UR STRIP: 1.02 (ref 1–1.03)
UROBILINOGEN UR QL STRIP: ABNORMAL
WBC NRBC COR # BLD: 13.2 10*3/MM3 (ref 4.5–10.7)
WHOLE BLOOD HOLD SPECIMEN: NORMAL
WHOLE BLOOD HOLD SPECIMEN: NORMAL

## 2018-06-30 PROCEDURE — 25010000002 ONDANSETRON PER 1 MG: Performed by: PHYSICIAN ASSISTANT

## 2018-06-30 PROCEDURE — 83690 ASSAY OF LIPASE: CPT | Performed by: PHYSICIAN ASSISTANT

## 2018-06-30 PROCEDURE — 81003 URINALYSIS AUTO W/O SCOPE: CPT | Performed by: PHYSICIAN ASSISTANT

## 2018-06-30 PROCEDURE — 25010000002 HYDROMORPHONE PER 4 MG: Performed by: EMERGENCY MEDICINE

## 2018-06-30 PROCEDURE — 84703 CHORIONIC GONADOTROPIN ASSAY: CPT | Performed by: PHYSICIAN ASSISTANT

## 2018-06-30 PROCEDURE — 80307 DRUG TEST PRSMV CHEM ANLYZR: CPT | Performed by: PHYSICIAN ASSISTANT

## 2018-06-30 PROCEDURE — 85025 COMPLETE CBC W/AUTO DIFF WBC: CPT | Performed by: PHYSICIAN ASSISTANT

## 2018-06-30 PROCEDURE — 96375 TX/PRO/DX INJ NEW DRUG ADDON: CPT

## 2018-06-30 PROCEDURE — 96361 HYDRATE IV INFUSION ADD-ON: CPT

## 2018-06-30 PROCEDURE — 99284 EMERGENCY DEPT VISIT MOD MDM: CPT

## 2018-06-30 PROCEDURE — 25010000002 PROMETHAZINE PER 50 MG: Performed by: PHYSICIAN ASSISTANT

## 2018-06-30 PROCEDURE — 96374 THER/PROPH/DIAG INJ IV PUSH: CPT

## 2018-06-30 PROCEDURE — 25010000002 HYDROMORPHONE PER 4 MG: Performed by: PHYSICIAN ASSISTANT

## 2018-06-30 PROCEDURE — 96376 TX/PRO/DX INJ SAME DRUG ADON: CPT

## 2018-06-30 PROCEDURE — 76705 ECHO EXAM OF ABDOMEN: CPT

## 2018-06-30 PROCEDURE — 80053 COMPREHEN METABOLIC PANEL: CPT | Performed by: PHYSICIAN ASSISTANT

## 2018-06-30 RX ORDER — PROMETHAZINE HYDROCHLORIDE 25 MG/ML
12.5 INJECTION, SOLUTION INTRAMUSCULAR; INTRAVENOUS ONCE
Status: COMPLETED | OUTPATIENT
Start: 2018-06-30 | End: 2018-06-30

## 2018-06-30 RX ORDER — PROMETHAZINE HYDROCHLORIDE 25 MG/1
25 TABLET ORAL EVERY 6 HOURS PRN
Qty: 20 TABLET | Refills: 0 | Status: SHIPPED | OUTPATIENT
Start: 2018-06-30 | End: 2018-09-18

## 2018-06-30 RX ORDER — FAMOTIDINE 10 MG/ML
20 INJECTION, SOLUTION INTRAVENOUS ONCE
Status: COMPLETED | OUTPATIENT
Start: 2018-06-30 | End: 2018-06-30

## 2018-06-30 RX ORDER — ONDANSETRON 2 MG/ML
4 INJECTION INTRAMUSCULAR; INTRAVENOUS ONCE
Status: COMPLETED | OUTPATIENT
Start: 2018-06-30 | End: 2018-06-30

## 2018-06-30 RX ORDER — SODIUM CHLORIDE 0.9 % (FLUSH) 0.9 %
10 SYRINGE (ML) INJECTION AS NEEDED
Status: DISCONTINUED | OUTPATIENT
Start: 2018-06-30 | End: 2018-06-30 | Stop reason: HOSPADM

## 2018-06-30 RX ADMIN — PROMETHAZINE HYDROCHLORIDE 12.5 MG: 25 INJECTION INTRAMUSCULAR; INTRAVENOUS at 04:14

## 2018-06-30 RX ADMIN — SODIUM CHLORIDE 1000 ML: 9 INJECTION, SOLUTION INTRAVENOUS at 01:25

## 2018-06-30 RX ADMIN — HYDROMORPHONE HYDROCHLORIDE 1 MG: 1 INJECTION, SOLUTION INTRAMUSCULAR; INTRAVENOUS; SUBCUTANEOUS at 01:30

## 2018-06-30 RX ADMIN — HYDROMORPHONE HYDROCHLORIDE 0.5 MG: 1 INJECTION, SOLUTION INTRAMUSCULAR; INTRAVENOUS; SUBCUTANEOUS at 02:10

## 2018-06-30 RX ADMIN — FAMOTIDINE 20 MG: 10 INJECTION, SOLUTION INTRAVENOUS at 01:31

## 2018-06-30 RX ADMIN — ONDANSETRON 4 MG: 2 INJECTION, SOLUTION INTRAMUSCULAR; INTRAVENOUS at 01:23

## 2018-07-05 ENCOUNTER — PREP FOR SURGERY (OUTPATIENT)
Dept: OTHER | Facility: HOSPITAL | Age: 23
End: 2018-07-05

## 2018-07-05 DIAGNOSIS — R11.2 NAUSEA AND VOMITING, INTRACTABILITY OF VOMITING NOT SPECIFIED, UNSPECIFIED VOMITING TYPE: Primary | ICD-10-CM

## 2018-07-05 DIAGNOSIS — R10.13 EPIGASTRIC PAIN: ICD-10-CM

## 2018-07-13 ENCOUNTER — OFFICE VISIT (OUTPATIENT)
Dept: GASTROENTEROLOGY | Facility: CLINIC | Age: 23
End: 2018-07-13

## 2018-07-13 VITALS
DIASTOLIC BLOOD PRESSURE: 78 MMHG | SYSTOLIC BLOOD PRESSURE: 118 MMHG | TEMPERATURE: 98.5 F | BODY MASS INDEX: 33.67 KG/M2 | HEIGHT: 64 IN | WEIGHT: 197.2 LBS

## 2018-07-13 DIAGNOSIS — R11.0 NAUSEA: Primary | ICD-10-CM

## 2018-07-13 DIAGNOSIS — R10.10 PAIN OF UPPER ABDOMEN: ICD-10-CM

## 2018-07-13 DIAGNOSIS — R11.2 NON-INTRACTABLE VOMITING WITH NAUSEA, UNSPECIFIED VOMITING TYPE: ICD-10-CM

## 2018-07-13 PROCEDURE — 99204 OFFICE O/P NEW MOD 45 MIN: CPT | Performed by: INTERNAL MEDICINE

## 2018-07-13 NOTE — PROGRESS NOTES
Chief Complaint   Patient presents with   • Vomiting   • Nausea       Alicia Powell is a 22 y.o. female who presents with Nausea vomiting, epigastric pain and elevation of her lipase and amylase 2 weeks ago    Chronic epigastric pain, intermittent and colicky.  Radiating into her back.  Associated with nausea and vomiting.  She does not know any inciting factors.  It is relieved with rest and fasting.  It is achy, occasionally stabbing.  Emergency room visit showed a mildly elevated lipase and amylase but CAT scan showed no pancreatitis.  THE RIGHT UPPER QUADRANT WAS NORMAL WITH NO GALLSTONES OR CHOLECYSTITIS.  SHE HAS NOT HAD A HIDA SCAN OR EGD.  He does endorse GERD not well controlled with PPI or H2 antagonist      Vomiting    This is a chronic problem. The current episode started more than 1 year ago. The problem occurs intermittently. The problem has been waxing and waning. The emesis has an appearance of stomach contents. There has been no fever. Associated symptoms include abdominal pain, diarrhea and dizziness. Pertinent negatives include no weight loss. She has tried nothing for the symptoms.       Past Medical History:   Diagnosis Date   • Anemia    • Anxiety    • Asthma    • Depression    • Dizziness    • GERD (gastroesophageal reflux disease)    • Multiple URI    • PCOS (polycystic ovarian syndrome) 5/6/2016   • Pneumonia    • Viral infection        Past Surgical History:   Procedure Laterality Date   • VAGINAL SEPTUM RESECTION      Excision   • WISDOM TOOTH EXTRACTION  02/2016         Current Outpatient Prescriptions:   •  albuterol (PROVENTIL HFA;VENTOLIN HFA) 108 (90 BASE) MCG/ACT inhaler, ProAir  (90 Base) MCG/ACT Inhalation Aerosol Solution; Patient Sig: ProAir  (90 Base) MCG/ACT Inhalation Aerosol Solution INHALE 1-2 PUFFS EVERY 4-6 HOURS AS NEEDED AND AS DIRECTED.; 1; 0; 06-Oct-2014; Active, Disp: , Rfl:   •  calcium carbonate (TUMS) 500 MG chewable tablet, Chew 1 tablet Daily.,  Disp: , Rfl:   •  Cetirizine HCl 10 MG capsule, Take 1 tablet by mouth nightly., Disp: , Rfl:   •  HYDROcodone-acetaminophen (NORCO) 5-325 MG per tablet, Take 1 tablet by mouth Every 6 (Six) Hours As Needed for Moderate Pain ., Disp: 20 tablet, Rfl: 0  •  ibuprofen (ADVIL,MOTRIN) 800 MG tablet, Take 1 tablet by mouth 3 (three) times a day after meals., Disp: , Rfl:   •  norgestimate-ethinyl estradiol (ORTHO-CYCLEN) 0.25-35 MG-MCG per tablet, Take 1 tablet by mouth daily., Disp: 28 tablet, Rfl: 12  •  ondansetron (ZOFRAN) 4 MG tablet, Take 1 tablet by mouth Every 6 (Six) Hours., Disp: 20 tablet, Rfl: 0  •  pantoprazole (PROTONIX) 40 MG EC tablet, Take 40 mg by mouth Daily., Disp: , Rfl:   •  PARoxetine (PAXIL) 10 MG tablet, Take 1 tablet by mouth Every Morning., Disp: 30 tablet, Rfl: 0  •  promethazine (PHENERGAN) 25 MG tablet, Take 1 tablet by mouth Every 6 (Six) Hours As Needed for Nausea or Vomiting., Disp: 20 tablet, Rfl: 0  •  RaNITidine HCl (ZANTAC PO), Take  by mouth., Disp: , Rfl:   •  sulfamethoxazole-trimethoprim (BACTRIM DS,SEPTRA DS) 800-160 MG per tablet, Take 1 tablet by mouth 2 (Two) Times a Day., Disp: 28 tablet, Rfl: 0    No Known Allergies    Social History     Social History   • Marital status: Single     Spouse name: N/A   • Number of children: N/A   • Years of education: N/A     Occupational History   • unemployed      Social History Main Topics   • Smoking status: Never Smoker   • Smokeless tobacco: Never Used   • Alcohol use Yes      Comment: 3 to 4 drinks per month    • Drug use: No   • Sexual activity: Defer     Other Topics Concern   • Not on file     Social History Narrative    Last Pelvic/PAP 2016       Family History   Problem Relation Age of Onset   • Anxiety disorder Mother    • Depression Mother    • CALDERON disease Mother    • Hypertension Maternal Grandmother    • Hyperlipidemia Maternal Grandmother    • Other Maternal Grandmother         GERD   • CALDERON disease Father    • Pancreatitis  Father    • Heart disease Maternal Grandfather    • Heart disease Paternal Grandfather    • Breast cancer Neg Hx    • Ovarian cancer Neg Hx    • Uterine cancer Neg Hx    • Colon cancer Neg Hx        Review of Systems   Constitutional: Negative for weight loss.   Gastrointestinal: Positive for abdominal distention, abdominal pain, diarrhea, nausea and vomiting.   Neurological: Positive for dizziness.   All other systems reviewed and are negative.      Vitals:    07/13/18 1112   BP: 118/78   Temp: 98.5 °F (36.9 °C)       Physical Exam   Constitutional: She is oriented to person, place, and time. She appears well-developed and well-nourished.   HENT:   Head: Normocephalic and atraumatic.   Eyes: Pupils are equal, round, and reactive to light.   Cardiovascular: Normal rate, regular rhythm and normal heart sounds.    Pulmonary/Chest: Effort normal and breath sounds normal.   Abdominal: Soft. Bowel sounds are normal. She exhibits no shifting dullness, no distension, no pulsatile liver, no fluid wave, no abdominal bruit, no ascites, no pulsatile midline mass and no mass. There is no hepatosplenomegaly. There is no tenderness. There is no rigidity and no guarding. No hernia.   Musculoskeletal: Normal range of motion.   Neurological: She is alert and oriented to person, place, and time.   Skin: Skin is warm and dry.   Psychiatric: She has a normal mood and affect. Her behavior is normal. Thought content normal.   Nursing note and vitals reviewed.      No images are attached to the encounter.    Problem list    Epigastric pain  Nausea  Vomiting  Elevated lipase and amylase but no evidence of pancreatitis on CAT scan  Normal right upper quadrant ultrasound  GERD not well controlled with PPI      Assessment/Plan    For GERD and epigastric pain with nausea and vomiting we will move to EGD  If EGD is normal I do think she may be having recurrent gallbladder dysfunction causing mild pancreatitis and I would therefore move to a  HIDA scan  If the HIDA scan is within normal limits with no reproduction of symptoms, we would consider MRCP versus EUS in a patient that may be having recurrent  pancreatitis episodes    Continue PPI for now, she should take Zofran as needed and FD guard as needed for symptomatic relief

## 2018-07-14 ENCOUNTER — ANESTHESIA (OUTPATIENT)
Dept: GASTROENTEROLOGY | Facility: HOSPITAL | Age: 23
End: 2018-07-14

## 2018-07-14 ENCOUNTER — ANESTHESIA EVENT (OUTPATIENT)
Dept: GASTROENTEROLOGY | Facility: HOSPITAL | Age: 23
End: 2018-07-14

## 2018-07-14 ENCOUNTER — HOSPITAL ENCOUNTER (OUTPATIENT)
Facility: HOSPITAL | Age: 23
Setting detail: HOSPITAL OUTPATIENT SURGERY
Discharge: HOME | End: 2018-07-14
Attending: INTERNAL MEDICINE | Admitting: INTERNAL MEDICINE

## 2018-07-14 VITALS
SYSTOLIC BLOOD PRESSURE: 132 MMHG | OXYGEN SATURATION: 100 % | TEMPERATURE: 98.7 F | HEART RATE: 88 BPM | DIASTOLIC BLOOD PRESSURE: 70 MMHG | RESPIRATION RATE: 16 BRPM | WEIGHT: 195.25 LBS | BODY MASS INDEX: 33.33 KG/M2 | HEIGHT: 64 IN

## 2018-07-14 DIAGNOSIS — R10.10 PAIN OF UPPER ABDOMEN: ICD-10-CM

## 2018-07-14 DIAGNOSIS — R11.0 NAUSEA: ICD-10-CM

## 2018-07-14 DIAGNOSIS — R11.2 NON-INTRACTABLE VOMITING WITH NAUSEA, UNSPECIFIED VOMITING TYPE: ICD-10-CM

## 2018-07-14 LAB
B-HCG UR QL: NEGATIVE
INTERNAL NEGATIVE CONTROL: NEGATIVE
INTERNAL POSITIVE CONTROL: POSITIVE
Lab: NORMAL

## 2018-07-14 PROCEDURE — 88305 TISSUE EXAM BY PATHOLOGIST: CPT | Performed by: INTERNAL MEDICINE

## 2018-07-14 PROCEDURE — 25010000002 PROPOFOL 10 MG/ML EMULSION: Performed by: ANESTHESIOLOGY

## 2018-07-14 PROCEDURE — 81025 URINE PREGNANCY TEST: CPT | Performed by: INTERNAL MEDICINE

## 2018-07-14 PROCEDURE — 43239 EGD BIOPSY SINGLE/MULTIPLE: CPT | Performed by: INTERNAL MEDICINE

## 2018-07-14 PROCEDURE — 88312 SPECIAL STAINS GROUP 1: CPT | Performed by: INTERNAL MEDICINE

## 2018-07-14 RX ORDER — PROPOFOL 10 MG/ML
VIAL (ML) INTRAVENOUS CONTINUOUS PRN
Status: DISCONTINUED | OUTPATIENT
Start: 2018-07-14 | End: 2018-07-14 | Stop reason: SURG

## 2018-07-14 RX ORDER — PROPOFOL 10 MG/ML
VIAL (ML) INTRAVENOUS AS NEEDED
Status: DISCONTINUED | OUTPATIENT
Start: 2018-07-14 | End: 2018-07-14 | Stop reason: SURG

## 2018-07-14 RX ORDER — SODIUM CHLORIDE, SODIUM LACTATE, POTASSIUM CHLORIDE, CALCIUM CHLORIDE 600; 310; 30; 20 MG/100ML; MG/100ML; MG/100ML; MG/100ML
INJECTION, SOLUTION INTRAVENOUS CONTINUOUS PRN
Status: DISCONTINUED | OUTPATIENT
Start: 2018-07-14 | End: 2018-07-14 | Stop reason: SURG

## 2018-07-14 RX ORDER — LIDOCAINE HYDROCHLORIDE 20 MG/ML
INJECTION, SOLUTION INFILTRATION; PERINEURAL AS NEEDED
Status: DISCONTINUED | OUTPATIENT
Start: 2018-07-14 | End: 2018-07-14 | Stop reason: SURG

## 2018-07-14 RX ORDER — SODIUM CHLORIDE, SODIUM LACTATE, POTASSIUM CHLORIDE, CALCIUM CHLORIDE 600; 310; 30; 20 MG/100ML; MG/100ML; MG/100ML; MG/100ML
1000 INJECTION, SOLUTION INTRAVENOUS CONTINUOUS
Status: DISCONTINUED | OUTPATIENT
Start: 2018-07-14 | End: 2018-07-14 | Stop reason: HOSPADM

## 2018-07-14 RX ADMIN — PROPOFOL 140 MCG/KG/MIN: 10 INJECTION, EMULSION INTRAVENOUS at 13:28

## 2018-07-14 RX ADMIN — SODIUM CHLORIDE, POTASSIUM CHLORIDE, SODIUM LACTATE AND CALCIUM CHLORIDE 1000 ML: 600; 310; 30; 20 INJECTION, SOLUTION INTRAVENOUS at 13:09

## 2018-07-14 RX ADMIN — LIDOCAINE HYDROCHLORIDE 50 MG: 20 INJECTION, SOLUTION INFILTRATION; PERINEURAL at 13:28

## 2018-07-14 RX ADMIN — SODIUM CHLORIDE, POTASSIUM CHLORIDE, SODIUM LACTATE AND CALCIUM CHLORIDE: 600; 310; 30; 20 INJECTION, SOLUTION INTRAVENOUS at 13:01

## 2018-07-14 RX ADMIN — PROPOFOL 150 MG: 10 INJECTION, EMULSION INTRAVENOUS at 13:28

## 2018-07-14 NOTE — ANESTHESIA POSTPROCEDURE EVALUATION
Patient: Alicia Powell    Procedure Summary     Date:  07/14/18 Room / Location:   [facility]    Anesthesia Start:  1323 Anesthesia Stop:  1336    Procedure:  ESOPHAGOGASTRODUODENOSCOPY WITH COLD BIOPSIES (N/A Esophagus) Diagnosis:       Nausea      Pain of upper abdomen      Non-intractable vomiting with nausea, unspecified vomiting type      (Nausea [R11.0])      (Pain of upper abdomen [R10.10])      (Non-intractable vomiting with nausea, unspecified vomiting type [R11.2])    Surgeon:  Damon Morrison MD Provider:  Shai Alfonso MD    Anesthesia Type:  MAC ASA Status:  1          Anesthesia Type: MAC  Last vitals  BP   112/55 (07/14/18 1339)   Temp   36.7 °C (98 °F) (07/14/18 1300)   Pulse   81 (07/14/18 1339)   Resp   16 (07/14/18 1339)     SpO2   97 % (07/14/18 1339)     Post Anesthesia Care and Evaluation    Patient location during evaluation: bedside  Patient participation: complete - patient participated  Level of consciousness: awake and alert  Pain score: 0  Pain management: adequate  Airway patency: patent  Anesthetic complications: No anesthetic complications  PONV Status: none  Cardiovascular status: acceptable  Respiratory status: acceptable  Hydration status: acceptable  Post Neuraxial Block status: Motor and sensory function returned to baseline

## 2018-07-14 NOTE — DISCHARGE INSTRUCTIONS
For the next 24 hours patient needs to be with a responsible adult.    For 24 hours DO NOT drive, operate machinery, appliances, drink alcohol, make important decisions or sign legal documents.    Start with a light or bland diet and advance to regular diet as tolerated.    Follow recommendations on procedure report provided by your doctor.    Call Dr Morrison for problems [phone] and for biopsy results in 7-10 days.      Problems may include but not limited to: large amounts of bleeding, trouble breathing, repeated vomiting, severe unrelieved pain, fever or chills.

## 2018-07-14 NOTE — ANESTHESIA PREPROCEDURE EVALUATION
Anesthesia Evaluation     Patient summary reviewed                Airway   Mallampati: II  TM distance: >3 FB  Neck ROM: full  No difficulty expected  Dental - normal exam     Pulmonary     breath sounds clear to auscultation  Cardiovascular   Exercise tolerance: good (4-7 METS)    Rhythm: regular  Rate: normal        Neuro/Psych  GI/Hepatic/Renal/Endo      Musculoskeletal     Abdominal    Substance History      OB/GYN          Other                        Anesthesia Plan    ASA 1     MAC     intravenous induction   Anesthetic plan and risks discussed with patient.

## 2018-07-16 LAB
CYTO UR: NORMAL
LAB AP CASE REPORT: NORMAL
PATH REPORT.FINAL DX SPEC: NORMAL
PATH REPORT.GROSS SPEC: NORMAL

## 2018-07-17 NOTE — PROGRESS NOTES
Pathology benign, schedule HIDA scan with CCK infusion  Office visit nurse practitioner next available

## 2018-07-20 ENCOUNTER — TELEPHONE (OUTPATIENT)
Dept: GASTROENTEROLOGY | Facility: CLINIC | Age: 23
End: 2018-07-20

## 2018-07-20 ENCOUNTER — OFFICE VISIT (OUTPATIENT)
Dept: OBSTETRICS AND GYNECOLOGY | Age: 23
End: 2018-07-20

## 2018-07-20 VITALS
WEIGHT: 200 LBS | DIASTOLIC BLOOD PRESSURE: 80 MMHG | SYSTOLIC BLOOD PRESSURE: 128 MMHG | BODY MASS INDEX: 34.15 KG/M2 | HEIGHT: 64 IN

## 2018-07-20 DIAGNOSIS — Z01.419 WELL WOMAN EXAM WITH ROUTINE GYNECOLOGICAL EXAM: Primary | ICD-10-CM

## 2018-07-20 DIAGNOSIS — R10.13 EPIGASTRIC PAIN: ICD-10-CM

## 2018-07-20 DIAGNOSIS — R11.2 NAUSEA AND VOMITING, INTRACTABILITY OF VOMITING NOT SPECIFIED, UNSPECIFIED VOMITING TYPE: Primary | ICD-10-CM

## 2018-07-20 PROCEDURE — 99395 PREV VISIT EST AGE 18-39: CPT | Performed by: NURSE PRACTITIONER

## 2018-07-20 RX ORDER — ETONOGESTREL AND ETHINYL ESTRADIOL 11.7; 2.7 MG/1; MG/1
1 INSERT, EXTENDED RELEASE VAGINAL
Qty: 1 EACH | Refills: 12 | Status: SHIPPED | OUTPATIENT
Start: 2018-07-20 | End: 2019-07-20

## 2018-07-20 NOTE — TELEPHONE ENCOUNTER
----- Message from Carlee Menezes sent at 7/20/2018 12:33 PM EDT -----  Regarding: path results  Contact: [phone]  Pt's mom Sonja called wanting to know if the path was back from the EGD she had on 7-14. Can you please call her thanks

## 2018-07-20 NOTE — TELEPHONE ENCOUNTER
----- Message from Damon Morrison MD sent at 7/17/2018  2:10 PM EDT -----  Pathology benign, schedule HIDA scan with CCK infusion  Office visit nurse practitioner next available

## 2018-07-20 NOTE — PROGRESS NOTES
[facility]  Routine Annual Visit    2018    Patient: Alicia Powell          MR#:[id number]      History of Present Illness    22 y.o. female  who presents for annual exam. Today will be her first pap smear. Alicia discussed possible PCOS with Dr. Delaney in . All lab work normal at that time. U/S and menstrual irregularities consistent with PCOS. She was started on OCPs for cycle control but reports she never picked up from pharmacy. She reports for the past year her cycles have been very regular, every 28-32 days.  50 pound weight loss is noted since her last visit- Alicia reports total of 70 pounds lost. She is currently sexually active and not using birth control other than condoms. She is interested in starting back on nuvaring. She has been on in the past and did great with this contraception per Alicia. She does report persistnet hirsutism- although somewhat improved. Will recheck testosterone today. No other complaints today. Has been having some GI trouble- all workup has been negative per Alicia.      Patient's last menstrual period was 2018 (exact date).  Obstetric History:  OB History      Para Term  AB Living    0 0 0 0 0 0    SAB TAB Ectopic Molar Multiple Live Births    0 0 0   0           Menstrual History:     Patient's last menstrual period was 2018 (exact date).       Sexual History:       ________________________________________  Patient Active Problem List   Diagnosis   • PCOS (polycystic ovarian syndrome)   • Hirsutism   • Abnormal uterine bleeding (AUB)   • Obesity   • Nausea   • Pain of upper abdomen   • Non-intractable vomiting with nausea       Past Medical History:   Diagnosis Date   • Anemia    • Anxiety    • Asthma    • Depression    • Dizziness    • GERD (gastroesophageal reflux disease)    • Multiple URI    • PCOS (polycystic ovarian syndrome) 2016   • Pneumonia    • Viral infection        Past Surgical History:   Procedure  Laterality Date   • ENDOSCOPY N/A 7/14/2018    Procedure: ESOPHAGOGASTRODUODENOSCOPY WITH COLD BIOPSIES;  Surgeon: Damon Morrison MD;  Location: [facility];  Service: Gastroenterology   • VAGINAL SEPTUM RESECTION  2012    Excision   • WISDOM TOOTH EXTRACTION  02/2016       History   Smoking Status   • Never Smoker   Smokeless Tobacco   • Never Used       Family History   Problem Relation Age of Onset   • Anxiety disorder Mother    • Depression Mother    • CALDERON disease Mother    • Hypertension Maternal Grandmother    • Hyperlipidemia Maternal Grandmother    • Other Maternal Grandmother         GERD   • CALDERON disease Father    • Pancreatitis Father    • Heart disease Maternal Grandfather    • Heart disease Paternal Grandfather    • Breast cancer Neg Hx    • Ovarian cancer Neg Hx    • Uterine cancer Neg Hx    • Colon cancer Neg Hx        Prior to Admission medications    Medication Sig Start Date End Date Taking? Authorizing Provider   albuterol (PROVENTIL HFA;VENTOLIN HFA) 108 (90 BASE) MCG/ACT inhaler ProAir  (90 Base) MCG/ACT Inhalation Aerosol Solution; Patient Sig: ProAir  (90 Base) MCG/ACT Inhalation Aerosol Solution INHALE 1-2 PUFFS EVERY 4-6 HOURS AS NEEDED AND AS DIRECTED.; 1; 0; 06-Oct-2014; Active 10/6/14  Yes ARIANA Franklin   calcium carbonate (TUMS) 500 MG chewable tablet Chew 1 tablet Daily.   Yes Historical Provider, MD   ibuprofen (ADVIL,MOTRIN) 800 MG tablet Take 800 mg by mouth Every 8 (Eight) Hours As Needed. 11/17/14  Yes ARIANA Franklin   ondansetron (ZOFRAN) 4 MG tablet Take 1 tablet by mouth Every 6 (Six) Hours. 6/29/18  Yes Ronal Mai MD   pantoprazole (PROTONIX) 40 MG EC tablet Take 40 mg by mouth Daily.   Yes Historical Provider, MD   PARoxetine (PAXIL) 10 MG tablet Take 1 tablet by mouth Every Morning. 6/27/18  Yes ARIANA Franklin   promethazine (PHENERGAN) 25 MG tablet Take 1 tablet by mouth Every 6 (Six) Hours As Needed for Nausea or Vomiting. 6/30/18  Yes  "ARIANA Aponte   RaNITidine HCl (ZANTAC PO) Take  by mouth.   Yes Historical Provider, MD   Cetirizine HCl 10 MG capsule Take 1 tablet by mouth nightly. 1/8/15   Waldemar Bush MD   HYDROcodone-acetaminophen (NORCO) 5-325 MG per tablet Take 1 tablet by mouth Every 6 (Six) Hours As Needed for Moderate Pain . 6/29/18   Ronal Mai MD   norgestimate-ethinyl estradiol (ORTHO-CYCLEN) 0.25-35 MG-MCG per tablet Take 1 tablet by mouth daily. 8/30/16   Laurie Delaney MD   sulfamethoxazole-trimethoprim (BACTRIM DS,SEPTRA DS) 800-160 MG per tablet Take 1 tablet by mouth 2 (Two) Times a Day. 6/29/18   Ronal Mai MD     ________________________________________    The following portions of the patient's history were reviewed and updated as appropriate: allergies, current medications, past family history, past medical history, past social history, past surgical history and problem list.    Review of Systems   Constitutional: Negative.    HENT: Negative.    Eyes: Negative for visual disturbance.   Respiratory: Negative for cough, shortness of breath and wheezing.    Cardiovascular: Negative for chest pain, palpitations and leg swelling.   Gastrointestinal: Negative for abdominal distention, abdominal pain, blood in stool, constipation, diarrhea, nausea and vomiting.   Endocrine: Negative for cold intolerance and heat intolerance.   Genitourinary: Negative for difficulty urinating, dyspareunia, dysuria, frequency, genital sores, hematuria, menstrual problem, pelvic pain, urgency, vaginal bleeding, vaginal discharge and vaginal pain.   Musculoskeletal: Negative.    Skin: Negative.    Neurological: Negative for dizziness, weakness, light-headedness, numbness and headaches.   Hematological: Negative.    Psychiatric/Behavioral: Negative.    Breasts: negative for lumps skin changes, dimpling, swelling, nipple changes/discharge bilaterally       Objective   Physical Exam    /80   Ht 162.6 cm (64\")   Wt 90.7 kg " "(200 lb)   LMP 07/02/2018 (Exact Date)   Breastfeeding? No   BMI 34.33 kg/m²    BP Readings from Last 3 Encounters:   07/20/18 128/80   07/14/18 132/70   07/13/18 118/78      Wt Readings from Last 3 Encounters:   07/20/18 90.7 kg (200 lb)   07/14/18 88.6 kg (195 lb 4 oz)   07/13/18 89.4 kg (197 lb 3.2 oz)        BMI: Estimated body mass index is 34.33 kg/m² as calculated from the following:    Height as of this encounter: 162.6 cm (64\").    Weight as of this encounter: 90.7 kg (200 lb).      General:   alert, appears stated age and cooperative   Heart: regular rate and rhythm, S1, S2 normal, no murmur, click, rub or gallop   Lungs: clear to auscultation bilaterally   Abdomen: soft, non-tender, without masses or organomegaly   Breast: inspection negative, no nipple discharge or bleeding, no masses or nodularity palpable   Vulva: normal   Vagina: normal mucosa, normal discharge   Cervix: no cervical motion tenderness and no lesions   Uterus: normal size, mobile, non-tender or normal shape and consistency   Adnexa: no mass, fullness, tenderness     Assessment:    1. Normal annual exam    2. Hirsutism- recheck testosterone; begin nuvaring  3. Contraception- all options discussed. Alicia only interested in nuvaring- R/B/A discussed  4.  Counseled on healthy lifestyle modifications, safe sex, condom use, and self breast exams.      Plan:    [x]  Rx: nuvaring  []  Mammogram request made  [x]  PAP done  []  Occult fecal blood test (Insure)  []  Labs:   [x]  GC/Chl/TV  []  DEXA scan   []  Referral for colonoscopy:           Kalli Fregoso, MARIBELL  7/20/2018 10:57 AM  "

## 2018-07-20 NOTE — TELEPHONE ENCOUNTER
Called pt's mother back. Advised that per Dr Morrison: the path results came back benign or normal. Advised that MD recommends to proceed with a HIDA scan to check the function of the galbladder and have the pt f/u with Adrienne, his NP at her next available appt. Pt's mother verb understanding. Appt made for 8/17 at 11 AM.   HIDA scan order placed.

## 2018-07-23 LAB
C TRACH RRNA SPEC QL NAA+PROBE: NEGATIVE
N GONORRHOEA RRNA SPEC QL NAA+PROBE: NEGATIVE
T VAGINALIS RRNA SPEC QL NAA+PROBE: NEGATIVE

## 2018-07-24 LAB
TESTOST FREE SERPL-MCNC: 1.5 PG/ML (ref 0–4.2)
TESTOST SERPL-MCNC: 41 NG/DL (ref 8–48)

## 2018-07-31 ENCOUNTER — OFFICE VISIT (OUTPATIENT)
Dept: FAMILY MEDICINE CLINIC | Facility: CLINIC | Age: 23
End: 2018-07-31

## 2018-07-31 VITALS
HEART RATE: 106 BPM | BODY MASS INDEX: 34.21 KG/M2 | HEIGHT: 64 IN | SYSTOLIC BLOOD PRESSURE: 118 MMHG | DIASTOLIC BLOOD PRESSURE: 70 MMHG | TEMPERATURE: 97.8 F | OXYGEN SATURATION: 98 % | WEIGHT: 200.4 LBS

## 2018-07-31 DIAGNOSIS — F41.8 DEPRESSION WITH ANXIETY: Primary | ICD-10-CM

## 2018-07-31 LAB
CONV .: ABNORMAL
CYTOLOGIST CVX/VAG CYTO: ABNORMAL
CYTOLOGY CVX/VAG DOC THIN PREP: ABNORMAL
DX ICD CODE: ABNORMAL
DX ICD CODE: ABNORMAL
HIV 1 & 2 AB SER-IMP: ABNORMAL
HPV I/H RISK 1 DNA CVX QL PROBE+SIG AMP: NEGATIVE
OTHER STN SPEC: ABNORMAL
PATH REPORT.FINAL DX SPEC: ABNORMAL
PATHOLOGIST CVX/VAG CYTO: ABNORMAL
STAT OF ADQ CVX/VAG CYTO-IMP: ABNORMAL

## 2018-07-31 PROCEDURE — 99213 OFFICE O/P EST LOW 20 MIN: CPT | Performed by: PHYSICIAN ASSISTANT

## 2018-07-31 NOTE — PROGRESS NOTES
Subjective   Alicia Powell is a 22 y.o. female. Patient seen today for follow-up for anxiety and moods    History of Present Illness     Patient seen today for follow-up for anxiety and moods. She is loving the Paxil and states that it is helping balance moods very well. Very pleased with the results. States no AE to the Paxil. Noticed results very quickly in first 2 weeks. Friends and family seem to see her as more positive, optimistic, and nicer. Otherwise feels very well, and feels like she is improving with her moods. Less negatively, less sadness, less anxiety. The Paxil exceeded what she thought it would do. States that the medication has helped with her energy as well as being more motivated.    Reports not sleeping as well- she is not as tired at night. Going to bed at 10 pm-1 am and waking at about 9:30-9 am.     Had appt with gyn with PAP and will start Nuva Ring.   Also was seen by GI and had EGD and will have HIDA scan with consideration of MRCP/ERCP.     The following portions of the patient's history were reviewed and updated as appropriate: allergies, current medications, past family history, past medical history, past social history, past surgical history and problem list.    Review of Systems   Psychiatric/Behavioral:        Anxiety    All other systems reviewed and are negative.      Objective   Physical Exam   Constitutional: She is oriented to person, place, and time. She appears well-developed and well-nourished.   HENT:   Head: Normocephalic and atraumatic.   Right Ear: External ear normal.   Left Ear: External ear normal.   Nose: Nose normal.   Eyes: Conjunctivae and lids are normal.   Neck: Neck supple. Carotid bruit is not present.   Cardiovascular: Normal rate, regular rhythm, normal heart sounds and intact distal pulses.  Exam reveals no gallop and no friction rub.    No murmur heard.  Pulmonary/Chest: Effort normal and breath sounds normal. No respiratory distress. She has no wheezes.  She has no rhonchi. She has no rales.   Musculoskeletal: She exhibits no edema or deformity.   Neurological: She is alert and oriented to person, place, and time. Gait normal.   Skin: Skin is warm and dry.   Psychiatric: She has a normal mood and affect. Her speech is normal and behavior is normal. Judgment and thought content normal. Cognition and memory are normal.   Nursing note and vitals reviewed.      Assessment/Plan   Alicia was seen today for follow-up.    Diagnoses and all orders for this visit:    Depression with anxiety      Patient Instructions   22 year old female who presents today in follow up of depression with anxiety. She reports dramatic improvement in moods, energy levels, motivation, and irritability. She was seen by GI with EGD and ha further testing scheduled. She was also seen by GYN with PAP and will start Nuva Ring. She has started invisiline treatment. Patient feels medication is working and the only AE she has noticed is decreased sleep- she reports not being as tired at night. Patient will take medication as soon as waking up and will exercise in the afternoon to wear off energy. She should call or return asap if worsening sleep issues or any other AE to medication or worsening moods. Patient verbalized understanding and agreement. Follow up in 6-8 weeks for follow up of moods and sleep.

## 2018-08-01 PROBLEM — R87.610 ASCUS OF CERVIX WITH NEGATIVE HIGH RISK HPV: Status: ACTIVE | Noted: 2018-08-01

## 2018-08-07 ENCOUNTER — TELEPHONE (OUTPATIENT)
Dept: OBSTETRICS AND GYNECOLOGY | Age: 23
End: 2018-08-07

## 2018-08-07 NOTE — TELEPHONE ENCOUNTER
Spoke with patient's mother, lab tests returned normal and pap smear addressed.  Mother advised to have patient call us if continuing to have the excess hair growth.  Verbalized understanding to all.

## 2018-08-07 NOTE — TELEPHONE ENCOUNTER
----- Message from MARIBELL Monzon sent at 8/1/2018 10:41 AM EDT -----  Please inform patient:  Your recent pap smear showed mild inflammatory changes only.   These changes occur as a result of mild inflammation in the vagina  When the pap smear shows inflammation, we automatically check for the HPV virus.   This test for HPV returned negative.    It is safe to repeat your pap smear in 1 year.

## 2018-08-14 DIAGNOSIS — F41.8 DEPRESSION WITH ANXIETY: ICD-10-CM

## 2018-08-14 RX ORDER — PAROXETINE 10 MG/1
10 TABLET, FILM COATED ORAL EVERY MORNING
Qty: 30 TABLET | Refills: 0 | Status: SHIPPED | OUTPATIENT
Start: 2018-08-14 | End: 2018-09-21 | Stop reason: SDUPTHER

## 2018-08-17 RX ORDER — PANTOPRAZOLE SODIUM 40 MG/1
40 TABLET, DELAYED RELEASE ORAL DAILY
Qty: 30 TABLET | Refills: 0 | Status: SHIPPED | OUTPATIENT
Start: 2018-08-17 | End: 2018-09-18 | Stop reason: SDDI

## 2018-09-18 ENCOUNTER — HOSPITAL ENCOUNTER (EMERGENCY)
Facility: HOSPITAL | Age: 23
Discharge: HOME | End: 2018-09-18
Attending: EMERGENCY MEDICINE | Admitting: EMERGENCY MEDICINE

## 2018-09-18 ENCOUNTER — APPOINTMENT (OUTPATIENT)
Dept: CT IMAGING | Facility: HOSPITAL | Age: 23
End: 2018-09-18

## 2018-09-18 VITALS
DIASTOLIC BLOOD PRESSURE: 74 MMHG | TEMPERATURE: 98.8 F | RESPIRATION RATE: 18 BRPM | HEIGHT: 64 IN | HEART RATE: 67 BPM | SYSTOLIC BLOOD PRESSURE: 114 MMHG | WEIGHT: 200.5 LBS | OXYGEN SATURATION: 100 % | BODY MASS INDEX: 34.23 KG/M2

## 2018-09-18 VITALS
RESPIRATION RATE: 20 BRPM | DIASTOLIC BLOOD PRESSURE: 93 MMHG | HEART RATE: 71 BPM | WEIGHT: 204.4 LBS | SYSTOLIC BLOOD PRESSURE: 162 MMHG | TEMPERATURE: 98 F | OXYGEN SATURATION: 100 % | BODY MASS INDEX: 34.89 KG/M2 | HEIGHT: 64 IN

## 2018-09-18 DIAGNOSIS — R10.84 GENERALIZED ABDOMINAL PAIN: Primary | ICD-10-CM

## 2018-09-18 DIAGNOSIS — R11.2 NON-INTRACTABLE VOMITING WITH NAUSEA, UNSPECIFIED VOMITING TYPE: ICD-10-CM

## 2018-09-18 DIAGNOSIS — R10.13 EPIGASTRIC PAIN: Primary | ICD-10-CM

## 2018-09-18 LAB
ALBUMIN SERPL-MCNC: 4.1 G/DL (ref 3.5–5.2)
ALBUMIN SERPL-MCNC: 4.3 G/DL (ref 3.5–5.2)
ALBUMIN/GLOB SERPL: 1.3 G/DL
ALBUMIN/GLOB SERPL: 1.4 G/DL
ALP SERPL-CCNC: 58 U/L (ref 39–117)
ALP SERPL-CCNC: 59 U/L (ref 39–117)
ALT SERPL W P-5'-P-CCNC: 11 U/L (ref 1–33)
ALT SERPL W P-5'-P-CCNC: 14 U/L (ref 1–33)
ANION GAP SERPL CALCULATED.3IONS-SCNC: 13.7 MMOL/L
ANION GAP SERPL CALCULATED.3IONS-SCNC: 17.5 MMOL/L
AST SERPL-CCNC: 14 U/L (ref 1–32)
AST SERPL-CCNC: 16 U/L (ref 1–32)
BASOPHILS # BLD AUTO: 0.01 10*3/MM3 (ref 0–0.2)
BASOPHILS # BLD AUTO: 0.03 10*3/MM3 (ref 0–0.2)
BASOPHILS NFR BLD AUTO: 0.1 % (ref 0–1.5)
BASOPHILS NFR BLD AUTO: 0.2 % (ref 0–1.5)
BILIRUB SERPL-MCNC: 0.2 MG/DL (ref 0.1–1.2)
BILIRUB SERPL-MCNC: 0.3 MG/DL (ref 0.1–1.2)
BILIRUB UR QL STRIP: NEGATIVE
BUN BLD-MCNC: 6 MG/DL (ref 6–20)
BUN BLD-MCNC: 9 MG/DL (ref 6–20)
BUN/CREAT SERPL: 13.6 (ref 7–25)
BUN/CREAT SERPL: 8.5 (ref 7–25)
CALCIUM SPEC-SCNC: 9.1 MG/DL (ref 8.6–10.5)
CALCIUM SPEC-SCNC: 9.2 MG/DL (ref 8.6–10.5)
CHLORIDE SERPL-SCNC: 103 MMOL/L (ref 98–107)
CHLORIDE SERPL-SCNC: 105 MMOL/L (ref 98–107)
CLARITY UR: CLEAR
CO2 SERPL-SCNC: 18.5 MMOL/L (ref 22–29)
CO2 SERPL-SCNC: 20.3 MMOL/L (ref 22–29)
COLOR UR: YELLOW
CREAT BLD-MCNC: 0.66 MG/DL (ref 0.57–1)
CREAT BLD-MCNC: 0.71 MG/DL (ref 0.57–1)
DEPRECATED RDW RBC AUTO: 42.3 FL (ref 37–54)
DEPRECATED RDW RBC AUTO: 42.4 FL (ref 37–54)
EOSINOPHIL # BLD AUTO: 0 10*3/MM3 (ref 0–0.7)
EOSINOPHIL # BLD AUTO: 0.05 10*3/MM3 (ref 0–0.7)
EOSINOPHIL NFR BLD AUTO: 0 % (ref 0.3–6.2)
EOSINOPHIL NFR BLD AUTO: 0.3 % (ref 0.3–6.2)
ERYTHROCYTE [DISTWIDTH] IN BLOOD BY AUTOMATED COUNT: 15.6 % (ref 11.7–13)
ERYTHROCYTE [DISTWIDTH] IN BLOOD BY AUTOMATED COUNT: 15.7 % (ref 11.7–13)
GFR SERPL CREATININE-BSD FRML MDRD: 104 ML/MIN/1.73
GFR SERPL CREATININE-BSD FRML MDRD: 113 ML/MIN/1.73
GLOBULIN UR ELPH-MCNC: 3 GM/DL
GLOBULIN UR ELPH-MCNC: 3.2 GM/DL
GLUCOSE BLD-MCNC: 108 MG/DL (ref 65–99)
GLUCOSE BLD-MCNC: 124 MG/DL (ref 65–99)
GLUCOSE UR STRIP-MCNC: NEGATIVE MG/DL
HCG SERPL QL: NEGATIVE
HCT VFR BLD AUTO: 33.8 % (ref 35.6–45.5)
HCT VFR BLD AUTO: 35 % (ref 35.6–45.5)
HGB BLD-MCNC: 10.5 G/DL (ref 11.9–15.5)
HGB BLD-MCNC: 10.8 G/DL (ref 11.9–15.5)
HGB UR QL STRIP.AUTO: NEGATIVE
HOLD SPECIMEN: NORMAL
HOLD SPECIMEN: NORMAL
HYPOCHROMIA BLD QL: NORMAL
IMM GRANULOCYTES # BLD: 0.01 10*3/MM3 (ref 0–0.03)
IMM GRANULOCYTES # BLD: 0.03 10*3/MM3 (ref 0–0.03)
IMM GRANULOCYTES NFR BLD: 0.1 % (ref 0–0.5)
IMM GRANULOCYTES NFR BLD: 0.2 % (ref 0–0.5)
KETONES UR QL STRIP: ABNORMAL
LEUKOCYTE ESTERASE UR QL STRIP.AUTO: NEGATIVE
LIPASE SERPL-CCNC: 26 U/L (ref 13–60)
LIPASE SERPL-CCNC: 39 U/L (ref 13–60)
LYMPHOCYTES # BLD AUTO: 1.08 10*3/MM3 (ref 0.9–4.8)
LYMPHOCYTES # BLD AUTO: 2.24 10*3/MM3 (ref 0.9–4.8)
LYMPHOCYTES NFR BLD AUTO: 13.2 % (ref 19.6–45.3)
LYMPHOCYTES NFR BLD AUTO: 8 % (ref 19.6–45.3)
MCH RBC QN AUTO: 22.9 PG (ref 26.9–32)
MCH RBC QN AUTO: 23.2 PG (ref 26.9–32)
MCHC RBC AUTO-ENTMCNC: 30.9 G/DL (ref 32.4–36.3)
MCHC RBC AUTO-ENTMCNC: 31.1 G/DL (ref 32.4–36.3)
MCV RBC AUTO: 74.3 FL (ref 80.5–98.2)
MCV RBC AUTO: 74.6 FL (ref 80.5–98.2)
MONOCYTES # BLD AUTO: 0.38 10*3/MM3 (ref 0.2–1.2)
MONOCYTES # BLD AUTO: 0.79 10*3/MM3 (ref 0.2–1.2)
MONOCYTES NFR BLD AUTO: 2.8 % (ref 5–12)
MONOCYTES NFR BLD AUTO: 4.7 % (ref 5–12)
NEUTROPHILS # BLD AUTO: 11.96 10*3/MM3 (ref 1.9–8.1)
NEUTROPHILS # BLD AUTO: 13.81 10*3/MM3 (ref 1.9–8.1)
NEUTROPHILS NFR BLD AUTO: 81.6 % (ref 42.7–76)
NEUTROPHILS NFR BLD AUTO: 89.1 % (ref 42.7–76)
NITRITE UR QL STRIP: NEGATIVE
NRBC BLD MANUAL-RTO: 0 /100 WBC (ref 0–0)
OVALOCYTES BLD QL SMEAR: NORMAL
PH UR STRIP.AUTO: 7 [PH] (ref 5–8)
PLAT MORPH BLD: NORMAL
PLATELET # BLD AUTO: 388 10*3/MM3 (ref 140–500)
PLATELET # BLD AUTO: 407 10*3/MM3 (ref 140–500)
PMV BLD AUTO: 10.8 FL (ref 6–12)
PMV BLD AUTO: 10.9 FL (ref 6–12)
POTASSIUM BLD-SCNC: 3.7 MMOL/L (ref 3.5–5.2)
POTASSIUM BLD-SCNC: 3.9 MMOL/L (ref 3.5–5.2)
PROT SERPL-MCNC: 7.1 G/DL (ref 6–8.5)
PROT SERPL-MCNC: 7.5 G/DL (ref 6–8.5)
PROT UR QL STRIP: NEGATIVE
RBC # BLD AUTO: 4.53 10*6/MM3 (ref 3.9–5.2)
RBC # BLD AUTO: 4.71 10*6/MM3 (ref 3.9–5.2)
SODIUM BLD-SCNC: 137 MMOL/L (ref 136–145)
SODIUM BLD-SCNC: 141 MMOL/L (ref 136–145)
SP GR UR STRIP: 1.02 (ref 1–1.03)
UROBILINOGEN UR QL STRIP: ABNORMAL
WBC MORPH BLD: NORMAL
WBC NRBC COR # BLD: 13.43 10*3/MM3 (ref 4.5–10.7)
WBC NRBC COR # BLD: 16.92 10*3/MM3 (ref 4.5–10.7)
WHOLE BLOOD HOLD SPECIMEN: NORMAL
WHOLE BLOOD HOLD SPECIMEN: NORMAL

## 2018-09-18 PROCEDURE — 85025 COMPLETE CBC W/AUTO DIFF WBC: CPT | Performed by: EMERGENCY MEDICINE

## 2018-09-18 PROCEDURE — 99283 EMERGENCY DEPT VISIT LOW MDM: CPT

## 2018-09-18 PROCEDURE — 25010000002 MORPHINE PER 10 MG: Performed by: NURSE PRACTITIONER

## 2018-09-18 PROCEDURE — 96375 TX/PRO/DX INJ NEW DRUG ADDON: CPT

## 2018-09-18 PROCEDURE — 96374 THER/PROPH/DIAG INJ IV PUSH: CPT

## 2018-09-18 PROCEDURE — 96361 HYDRATE IV INFUSION ADD-ON: CPT

## 2018-09-18 PROCEDURE — 80053 COMPREHEN METABOLIC PANEL: CPT | Performed by: EMERGENCY MEDICINE

## 2018-09-18 PROCEDURE — 83690 ASSAY OF LIPASE: CPT | Performed by: EMERGENCY MEDICINE

## 2018-09-18 PROCEDURE — 25010000002 IOPAMIDOL 61 % SOLUTION: Performed by: EMERGENCY MEDICINE

## 2018-09-18 PROCEDURE — 85007 BL SMEAR W/DIFF WBC COUNT: CPT | Performed by: EMERGENCY MEDICINE

## 2018-09-18 PROCEDURE — 25010000002 PROMETHAZINE PER 50 MG: Performed by: EMERGENCY MEDICINE

## 2018-09-18 PROCEDURE — 25010000002 ONDANSETRON PER 1 MG: Performed by: NURSE PRACTITIONER

## 2018-09-18 PROCEDURE — 85025 COMPLETE CBC W/AUTO DIFF WBC: CPT | Performed by: NURSE PRACTITIONER

## 2018-09-18 PROCEDURE — 80053 COMPREHEN METABOLIC PANEL: CPT | Performed by: NURSE PRACTITIONER

## 2018-09-18 PROCEDURE — 81003 URINALYSIS AUTO W/O SCOPE: CPT | Performed by: EMERGENCY MEDICINE

## 2018-09-18 PROCEDURE — 25010000002 HALOPERIDOL LACTATE PER 5 MG: Performed by: EMERGENCY MEDICINE

## 2018-09-18 PROCEDURE — 74177 CT ABD & PELVIS W/CONTRAST: CPT

## 2018-09-18 PROCEDURE — 99284 EMERGENCY DEPT VISIT MOD MDM: CPT

## 2018-09-18 PROCEDURE — 25010000002 DIPHENHYDRAMINE PER 50 MG: Performed by: EMERGENCY MEDICINE

## 2018-09-18 PROCEDURE — 84703 CHORIONIC GONADOTROPIN ASSAY: CPT | Performed by: EMERGENCY MEDICINE

## 2018-09-18 RX ORDER — SUCRALFATE 1 G/1
1 TABLET ORAL 4 TIMES DAILY
Qty: 40 TABLET | Refills: 0 | Status: SHIPPED | OUTPATIENT
Start: 2018-09-18 | End: 2019-10-20

## 2018-09-18 RX ORDER — PANTOPRAZOLE SODIUM 40 MG/10ML
80 INJECTION, POWDER, LYOPHILIZED, FOR SOLUTION INTRAVENOUS ONCE
Status: COMPLETED | OUTPATIENT
Start: 2018-09-18 | End: 2018-09-18

## 2018-09-18 RX ORDER — PROMETHAZINE HYDROCHLORIDE 25 MG/ML
12.5 INJECTION, SOLUTION INTRAMUSCULAR; INTRAVENOUS ONCE
Status: COMPLETED | OUTPATIENT
Start: 2018-09-18 | End: 2018-09-18

## 2018-09-18 RX ORDER — DIPHENHYDRAMINE HYDROCHLORIDE 50 MG/ML
50 INJECTION INTRAMUSCULAR; INTRAVENOUS ONCE
Status: COMPLETED | OUTPATIENT
Start: 2018-09-18 | End: 2018-09-18

## 2018-09-18 RX ORDER — SODIUM CHLORIDE 0.9 % (FLUSH) 0.9 %
10 SYRINGE (ML) INJECTION AS NEEDED
Status: DISCONTINUED | OUTPATIENT
Start: 2018-09-18 | End: 2018-09-18 | Stop reason: HOSPADM

## 2018-09-18 RX ORDER — FAMOTIDINE 20 MG/1
20 TABLET, FILM COATED ORAL 2 TIMES DAILY
COMMUNITY
End: 2018-12-03

## 2018-09-18 RX ORDER — HALOPERIDOL 5 MG/ML
2 INJECTION INTRAMUSCULAR ONCE
Status: COMPLETED | OUTPATIENT
Start: 2018-09-18 | End: 2018-09-18

## 2018-09-18 RX ORDER — HALOPERIDOL 5 MG/ML
2 INJECTION INTRAMUSCULAR ONCE
Status: DISCONTINUED | OUTPATIENT
Start: 2018-09-18 | End: 2018-09-18

## 2018-09-18 RX ORDER — DICYCLOMINE HYDROCHLORIDE 10 MG/1
10 CAPSULE ORAL
Qty: 30 CAPSULE | Refills: 0 | Status: SHIPPED | OUTPATIENT
Start: 2018-09-18 | End: 2018-12-03 | Stop reason: SDUPTHER

## 2018-09-18 RX ORDER — ONDANSETRON 4 MG/1
4 TABLET, ORALLY DISINTEGRATING ORAL EVERY 6 HOURS PRN
Qty: 15 TABLET | Refills: 0 | Status: SHIPPED | OUTPATIENT
Start: 2018-09-18 | End: 2018-09-24

## 2018-09-18 RX ORDER — ONDANSETRON 2 MG/ML
4 INJECTION INTRAMUSCULAR; INTRAVENOUS ONCE
Status: COMPLETED | OUTPATIENT
Start: 2018-09-18 | End: 2018-09-18

## 2018-09-18 RX ADMIN — HALOPERIDOL LACTATE 2 MG: 5 INJECTION, SOLUTION INTRAMUSCULAR at 02:18

## 2018-09-18 RX ADMIN — MORPHINE SULFATE 4 MG: 4 INJECTION INTRAVENOUS at 09:56

## 2018-09-18 RX ADMIN — PANTOPRAZOLE SODIUM 80 MG: 40 INJECTION, POWDER, FOR SOLUTION INTRAVENOUS at 10:14

## 2018-09-18 RX ADMIN — ONDANSETRON 4 MG: 2 INJECTION INTRAMUSCULAR; INTRAVENOUS at 09:56

## 2018-09-18 RX ADMIN — IOPAMIDOL 85 ML: 612 INJECTION, SOLUTION INTRAVENOUS at 01:55

## 2018-09-18 RX ADMIN — DIPHENHYDRAMINE HYDROCHLORIDE 50 MG: 50 INJECTION INTRAMUSCULAR; INTRAVENOUS at 02:18

## 2018-09-18 RX ADMIN — SODIUM CHLORIDE, POTASSIUM CHLORIDE, SODIUM LACTATE AND CALCIUM CHLORIDE 1000 ML: 600; 310; 30; 20 INJECTION, SOLUTION INTRAVENOUS at 10:10

## 2018-09-18 RX ADMIN — SODIUM CHLORIDE 1000 ML: 9 INJECTION, SOLUTION INTRAVENOUS at 00:38

## 2018-09-18 RX ADMIN — PROMETHAZINE HYDROCHLORIDE 12.5 MG: 25 INJECTION, SOLUTION INTRAMUSCULAR; INTRAVENOUS at 00:39

## 2018-09-18 NOTE — ED NOTES
[facility], Protonix is not stocked down here at the moment. They will send it to [facility]. Ari RUIZ notified.      Lucia Demarco RN  09/18/18 [id number]

## 2018-09-18 NOTE — ED PROVIDER NOTES
EMERGENCY DEPARTMENT ENCOUNTER    CHIEF COMPLAINT  Chief Complaint: abdominal pain  History given by:patient  History limited by:nothing  Time Seen: 0940  Room Number: 32/32  PMD: Dania Alston PA      HPI:  Pt is a 22 y.o. female who presents with upper abdominal pain.  Patient was seen in the emergency room yesterday states the pain has only increased and become more consistent.  Reports one episode of vomiting that was bloody.  Patient states she's not been able to tolerate any food.  Patient denies fever, chills, urinary concerns, back pain, any sick contacts or recent travel.  Patient states she's had multiple episodes of diarrhea also.    Duration: x 24 hours  Location:upper abd  Radiation:denies  Quality:pain  Intensity/Severity:severe  Progression:worsening  Associated Symptoms:n/v/d  Aggravating Factors:food  Alleviating Factors:denies  Treatment before arrival:seen yesterday in ER    PAST MEDICAL HISTORY  Active Ambulatory Problems     Diagnosis Date Noted   • PCOS (polycystic ovarian syndrome) 05/06/2016   • Hirsutism 05/06/2016   • Abnormal uterine bleeding (AUB) 05/06/2016   • Obesity 05/06/2016   • Nausea 07/13/2018   • Pain of upper abdomen 07/13/2018   • Non-intractable vomiting with nausea 07/13/2018   • ASCUS of cervix with negative high risk HPV 08/01/2018     Resolved Ambulatory Problems     Diagnosis Date Noted   • No Resolved Ambulatory Problems     Past Medical History:   Diagnosis Date   • Anemia    • Anxiety    • ASCUS of cervix with negative high risk HPV 8/1/2018   • Asthma    • Depression    • Dizziness    • GERD (gastroesophageal reflux disease)    • Multiple URI    • PCOS (polycystic ovarian syndrome) 5/6/2016   • Pneumonia    • Viral infection        PAST SURGICAL HISTORY  Past Surgical History:   Procedure Laterality Date   • ENDOSCOPY N/A 7/14/2018    Procedure: ESOPHAGOGASTRODUODENOSCOPY WITH COLD BIOPSIES;  Surgeon: Damon Morrison MD;  Location: [facility] ENDOSCOPY;  Service:  Gastroenterology   • VAGINAL SEPTUM RESECTION  2012    Excision   • WISDOM TOOTH EXTRACTION  02/2016       FAMILY HISTORY  Family History   Problem Relation Age of Onset   • Anxiety disorder Mother    • Depression Mother    • CALDERON disease Mother    • Hypertension Maternal Grandmother    • Hyperlipidemia Maternal Grandmother    • Other Maternal Grandmother         GERD   • CALDERON disease Father    • Pancreatitis Father    • Heart disease Maternal Grandfather    • Heart disease Paternal Grandfather    • Breast cancer Neg Hx    • Ovarian cancer Neg Hx    • Uterine cancer Neg Hx    • Colon cancer Neg Hx        SOCIAL HISTORY  Social History     Social History   • Marital status: Single     Spouse name: N/A   • Number of children: 0   • Years of education: N/A     Occupational History   • unemployed      Social History Main Topics   • Smoking status: Never Smoker   • Smokeless tobacco: Never Used   • Alcohol use Yes      Comment: 3 to 4 drinks per month    • Drug use: Yes     Types: Marijuana      Comment: PATIENT STATES LAST USE MARIJUANA YESTERDAY 7/13/18. PATIENT STATES SHE USES MARIJUANA EVERYDAY.   • Sexual activity: Yes     Partners: Male     Birth control/ protection: None     Other Topics Concern   • Not on file     Social History Narrative    Last Pelvic/PAP 2016         ALLERGIES  Patient has no known allergies.    REVIEW OF SYSTEMS  Review of Systems   Constitutional: Negative.  Negative for activity change, appetite change ( decreased), chills, fatigue and fever.   HENT: Negative.  Negative for congestion, ear pain, rhinorrhea and sore throat.    Eyes: Negative.    Respiratory: Negative.  Negative for cough and shortness of breath.    Cardiovascular: Negative.  Negative for chest pain, palpitations and leg swelling ( pedal).   Gastrointestinal: Positive for abdominal pain, nausea and vomiting ( bloody). Negative for constipation and diarrhea.   Endocrine: Negative.    Genitourinary: Negative.  Negative for  decreased urine volume, difficulty urinating, dysuria, menstrual problem, pelvic pain, urgency and vaginal discharge.   Musculoskeletal: Negative.  Negative for back pain.   Skin: Negative.  Negative for rash.   Allergic/Immunologic: Negative.    Neurological: Negative.  Negative for dizziness, weakness, light-headedness, numbness and headaches.   Hematological: Negative.    Psychiatric/Behavioral: Negative.  The patient is not nervous/anxious.    All other systems reviewed and are negative.      PHYSICAL EXAM  ED Triage Vitals   Temp Heart Rate Resp BP SpO2   09/18/18 0933 09/18/18 0933 09/18/18 0941 -- 09/18/18 0933   98.8 °F (37.1 °C) 88 18  100 %      Temp src Heart Rate Source Patient Position BP Location FiO2 (%)   09/18/18 0933 09/18/18 0933 -- -- --   Tympanic Monitor          Physical Exam   Constitutional: She is well-developed, well-nourished, and in no distress. No distress.   HENT:   Head: Normocephalic and atraumatic.   Mouth/Throat: Oropharynx is clear and moist and mucous membranes are normal.   Eyes: Pupils are equal, round, and reactive to light.   Neck: Normal range of motion.   Cardiovascular: Normal rate, regular rhythm and normal heart sounds.    Pulmonary/Chest: Effort normal and breath sounds normal. She has no wheezes.   Abdominal: Soft. Bowel sounds are normal. There is tenderness in the epigastric area.   Musculoskeletal: Normal range of motion. She exhibits no edema.   Neurological: She is alert.   Skin: Skin is warm and dry. No rash noted.   Psychiatric: Mood, memory, affect and judgment normal.   Nursing note and vitals reviewed.      LAB RESULTS  Recent Results (from the past 24 hour(s))   Comprehensive Metabolic Panel    Collection Time: 09/18/18 12:47 AM   Result Value Ref Range    Glucose 108 (H) 65 - 99 mg/dL    BUN 9 6 - 20 mg/dL    Creatinine 0.66 0.57 - 1.00 mg/dL    Sodium 141 136 - 145 mmol/L    Potassium 3.7 3.5 - 5.2 mmol/L    Chloride 105 98 - 107 mmol/L    CO2 18.5 (L)  22.0 - 29.0 mmol/L    Calcium 9.1 8.6 - 10.5 mg/dL    Total Protein 7.1 6.0 - 8.5 g/dL    Albumin 4.10 3.50 - 5.20 g/dL    ALT (SGPT) 14 1 - 33 U/L    AST (SGOT) 16 1 - 32 U/L    Alkaline Phosphatase 58 39 - 117 U/L    Total Bilirubin 0.2 0.1 - 1.2 mg/dL    eGFR Non African Amer 113 >60 mL/min/1.73    Globulin 3.0 gm/dL    A/G Ratio 1.4 g/dL    BUN/Creatinine Ratio 13.6 7.0 - 25.0    Anion Gap 17.5 mmol/L   Lipase    Collection Time: 09/18/18 12:47 AM   Result Value Ref Range    Lipase 26 13 - 60 U/L   CBC Auto Differential    Collection Time: 09/18/18 12:47 AM   Result Value Ref Range    WBC 16.92 (H) 4.50 - 10.70 10*3/mm3    RBC 4.53 3.90 - 5.20 10*6/mm3    Hemoglobin 10.5 (L) 11.9 - 15.5 g/dL    Hematocrit 33.8 (L) 35.6 - 45.5 %    MCV 74.6 (L) 80.5 - 98.2 fL    MCH 23.2 (L) 26.9 - 32.0 pg    MCHC 31.1 (L) 32.4 - 36.3 g/dL    RDW 15.6 (H) 11.7 - 13.0 %    RDW-SD 42.4 37.0 - 54.0 fl    MPV 10.9 6.0 - 12.0 fL    Platelets 388 140 - 500 10*3/mm3    Neutrophil % 81.6 (H) 42.7 - 76.0 %    Lymphocyte % 13.2 (L) 19.6 - 45.3 %    Monocyte % 4.7 (L) 5.0 - 12.0 %    Eosinophil % 0.3 0.3 - 6.2 %    Basophil % 0.2 0.0 - 1.5 %    Immature Grans % 0.2 0.0 - 0.5 %    Neutrophils, Absolute 13.81 (H) 1.90 - 8.10 10*3/mm3    Lymphocytes, Absolute 2.24 0.90 - 4.80 10*3/mm3    Monocytes, Absolute 0.79 0.20 - 1.20 10*3/mm3    Eosinophils, Absolute 0.05 0.00 - 0.70 10*3/mm3    Basophils, Absolute 0.03 0.00 - 0.20 10*3/mm3    Immature Grans, Absolute 0.03 0.00 - 0.03 10*3/mm3    nRBC 0.0 0.0 - 0.0 /100 WBC   hCG, Serum, Qualitative    Collection Time: 09/18/18 12:47 AM   Result Value Ref Range    HCG Qualitative Negative Indeterminate, Negative   Scan Slide    Collection Time: 09/18/18 12:47 AM   Result Value Ref Range    Hypochromia Mod/2+ None Seen    Ovalocytes Slight/1+ None Seen    WBC Morphology Normal Normal    Platelet Morphology Normal Normal   Urinalysis With Microscopic If Indicated (No Culture) - Urine, Clean Catch     Collection Time: 09/18/18  2:21 AM   Result Value Ref Range    Color, UA Yellow Yellow, Straw    Appearance, UA Clear Clear    pH, UA 7.0 5.0 - 8.0    Specific Gravity, UA 1.025 1.005 - 1.030    Glucose, UA Negative Negative    Ketones, UA 80 mg/dL (3+) (A) Negative    Bilirubin, UA Negative Negative    Blood, UA Negative Negative    Protein, UA Negative Negative    Leuk Esterase, UA Negative Negative    Nitrite, UA Negative Negative    Urobilinogen, UA 1.0 E.U./dL 0.2 - 1.0 E.U./dL   Light Blue Top    Collection Time: 09/18/18  9:44 AM   Result Value Ref Range    Extra Tube hold for add-on    Green Top (Gel)    Collection Time: 09/18/18  9:44 AM   Result Value Ref Range    Extra Tube Hold for add-ons.    Lavender Top    Collection Time: 09/18/18  9:44 AM   Result Value Ref Range    Extra Tube hold for add-on    Gold Top - SST    Collection Time: 09/18/18  9:44 AM   Result Value Ref Range    Extra Tube Hold for add-ons.    Comprehensive Metabolic Panel    Collection Time: 09/18/18  9:44 AM   Result Value Ref Range    Glucose 124 (H) 65 - 99 mg/dL    BUN 6 6 - 20 mg/dL    Creatinine 0.71 0.57 - 1.00 mg/dL    Sodium 137 136 - 145 mmol/L    Potassium 3.9 3.5 - 5.2 mmol/L    Chloride 103 98 - 107 mmol/L    CO2 20.3 (L) 22.0 - 29.0 mmol/L    Calcium 9.2 8.6 - 10.5 mg/dL    Total Protein 7.5 6.0 - 8.5 g/dL    Albumin 4.30 3.50 - 5.20 g/dL    ALT (SGPT) 11 1 - 33 U/L    AST (SGOT) 14 1 - 32 U/L    Alkaline Phosphatase 59 39 - 117 U/L    Total Bilirubin 0.3 0.1 - 1.2 mg/dL    eGFR Non African Amer 104 >60 mL/min/1.73    Globulin 3.2 gm/dL    A/G Ratio 1.3 g/dL    BUN/Creatinine Ratio 8.5 7.0 - 25.0    Anion Gap 13.7 mmol/L   Lipase    Collection Time: 09/18/18  9:44 AM   Result Value Ref Range    Lipase 39 13 - 60 U/L   CBC Auto Differential    Collection Time: 09/18/18  9:44 AM   Result Value Ref Range    WBC 13.43 (H) 4.50 - 10.70 10*3/mm3    RBC 4.71 3.90 - 5.20 10*6/mm3    Hemoglobin 10.8 (L) 11.9 - 15.5 g/dL     Hematocrit 35.0 (L) 35.6 - 45.5 %    MCV 74.3 (L) 80.5 - 98.2 fL    MCH 22.9 (L) 26.9 - 32.0 pg    MCHC 30.9 (L) 32.4 - 36.3 g/dL    RDW 15.7 (H) 11.7 - 13.0 %    RDW-SD 42.3 37.0 - 54.0 fl    MPV 10.8 6.0 - 12.0 fL    Platelets 407 140 - 500 10*3/mm3    Neutrophil % 89.1 (H) 42.7 - 76.0 %    Lymphocyte % 8.0 (L) 19.6 - 45.3 %    Monocyte % 2.8 (L) 5.0 - 12.0 %    Eosinophil % 0.0 (L) 0.3 - 6.2 %    Basophil % 0.1 0.0 - 1.5 %    Immature Grans % 0.1 0.0 - 0.5 %    Neutrophils, Absolute 11.96 (H) 1.90 - 8.10 10*3/mm3    Lymphocytes, Absolute 1.08 0.90 - 4.80 10*3/mm3    Monocytes, Absolute 0.38 0.20 - 1.20 10*3/mm3    Eosinophils, Absolute 0.00 0.00 - 0.70 10*3/mm3    Basophils, Absolute 0.01 0.00 - 0.20 10*3/mm3    Immature Grans, Absolute 0.01 0.00 - 0.03 10*3/mm3       I ordered the above labs and reviewed the results      MEDICAL RECORD REVIEW    Reviewed ER visit from 9/17/18 including lab work and CT findings.     PROGRESS AND CONSULTS    Reviewed pt's history and workup with Dr. Vincent.   After a bedside evaluation; Dr Vincent agrees with the plan of care    1050-discussed lab results with patient and .  Patient is feeling a lot better sitting up in bed smiling.  Patient denies any pain or nausea.  Will give patient a by mouth challenge    1120-patient was able to tolerate by mouth challenge.  Patient states she is ready to go home      COURSE & MEDICAL DECISION MAKING  Pertinent Labs and Imaging studies that were ordered and reviewed are noted above.  Results were reviewed/discussed with the patient and they were also made aware of online assess.   Pt also made aware that some labs, such as cultures, will not be resulted during ER visit and follow up with PMD is necessary.     MEDICATIONS GIVEN IN ER  Medications   sodium chloride 0.9 % flush 10 mL (not administered)   pantoprazole (PROTONIX) injection 80 mg (80 mg Intravenous Given 9/18/18 1014)   ondansetron (ZOFRAN) injection 4 mg (4 mg Intravenous  "Given 9/18/18 0956)   morphine injection 4 mg (4 mg Intravenous Given 9/18/18 0956)   lactated ringers bolus 1,000 mL (1,000 mL Intravenous New Bag 9/18/18 1010)       /74 (BP Location: Right arm, Patient Position: Lying)   Pulse 67   Temp 98.8 °F (37.1 °C) (Tympanic)   Resp 18   Ht 162.6 cm (64\")   Wt 90.9 kg (200 lb 8 oz) Comment: Simultaneous filing. User may be unaware of other data.  SpO2 100%   BMI 34.42 kg/m²     Discussed all results and noted any abnormalities with patient.  Discussed absoute need to recheck abnormalities with PMD and GI specialist    Reviewed implications of results, diagnosis, meds, responsibility to follow up, warning signs and symptoms of possible worsening, potential complications and reasons to return to ER with patient    Discussed plan for discharge, as there is no emergent indication for admission.  Pt is agreeable and understands need for follow up and repeat testing.  Pt is aware that discharge does not mean that nothing is wrong but it indicates no emergency is present and they must continue care with PMD and GI specialist.  Pt is discharged with instructions to follow up with primary care doctor to have their blood pressure rechecked.       DIAGNOSIS  Final diagnoses:   Epigastric pain   Non-intractable vomiting with nausea, unspecified vomiting type       FOLLOW UP   Dania Alston, PA  [address] [facility] [address] [phone]    Schedule an appointment as soon as possible for a visit       Axel Ferrell MD  [address] [address] [phone]            RX     Medication List      New Prescriptions    sucralfate 1 g tablet  Commonly known as:  CARAFATE  Take 1 tablet by mouth 4 (Four) Times a Day.           Nicol Hall, APRN  09/18/18 1125    "

## 2018-09-18 NOTE — ED TRIAGE NOTES
Pt to ED for abd pain and vomiting. Pt was seen here and d/c from ED at 0445 this morning for same complaint

## 2018-09-18 NOTE — ED PROVIDER NOTES
MD ATTESTATION NOTE  HPI:Pt states that she has been having N/V/D that started yesterday. Pain is  waxing and wanning. Pt states that she vomited blood this morning after vomiting for most of the night. Pt states that she has generalized weakness.  She does admit to daily TCH use and she is wondering if this may be a factor.     PE: heart is regular rate and rhythm, lungs are clear, Diffuse abd tenderness, and oral mucosa is dry.    Plan: 1003- Plan to check labs and radiology.       The MAURISIO and I have discussed this patient's history, physical exam, and treatment plan.  I have reviewed the documentation and personally had a face to face interaction with the patient. I affirm the documentation and agree with the treatment and plan.  The attached note describes my personal findings.    Documentation assistance provided by karrie Vargas for Dr. Vincent.  Information recorded by the scribe was done at my direction and has been verified and validated by me.           Shankar Vargas  09/18/18 1127       Jeffery Vincent MD  09/18/18 2638

## 2018-09-18 NOTE — ED PROVIDER NOTES
" EMERGENCY DEPARTMENT ENCOUNTER    CHIEF COMPLAINT  Chief Complaint: Abdominal Pain  History given by: Patient  History limited by: Bc fleming  Room Number: 17/17  PMD: Dania Alston PA    HPI:  Pt is a 22 y.o. female who presents complaining of abdominal pain that began between 7 pm and 9 pm yesterday. Pt states she was eating when pain began. Pt doesn't remember getting here. Pt reports N/V with \"white, foamy\". Pt reports diarrhea today. Pt states she last smoked marijuana this evening. Pt smokes marijuana daily.     Duration:  ~6 hours  Onset: gradual  Timing: constant  Location: abdomen  Radiation: none  Quality: pain  Intensity/Severity: moderate  Progression: unchanged      PAST MEDICAL HISTORY  Active Ambulatory Problems     Diagnosis Date Noted   • PCOS (polycystic ovarian syndrome) 05/06/2016   • Hirsutism 05/06/2016   • Abnormal uterine bleeding (AUB) 05/06/2016   • Obesity 05/06/2016   • Nausea 07/13/2018   • Pain of upper abdomen 07/13/2018   • Non-intractable vomiting with nausea 07/13/2018   • ASCUS of cervix with negative high risk HPV 08/01/2018     Resolved Ambulatory Problems     Diagnosis Date Noted   • No Resolved Ambulatory Problems     Past Medical History:   Diagnosis Date   • Anemia    • Anxiety    • ASCUS of cervix with negative high risk HPV 8/1/2018   • Asthma    • Depression    • Dizziness    • GERD (gastroesophageal reflux disease)    • Multiple URI    • PCOS (polycystic ovarian syndrome) 5/6/2016   • Pneumonia    • Viral infection        PAST SURGICAL HISTORY  Past Surgical History:   Procedure Laterality Date   • ENDOSCOPY N/A 7/14/2018    Procedure: ESOPHAGOGASTRODUODENOSCOPY WITH COLD BIOPSIES;  Surgeon: Damon Morrison MD;  Location: [facility] ENDOSCOPY;  Service: Gastroenterology   • VAGINAL SEPTUM RESECTION  2012    Excision   • WISDOM TOOTH EXTRACTION  02/2016       FAMILY HISTORY  Family History   Problem Relation Age of Onset   • Anxiety disorder Mother    • Depression " Mother    • CALDERON disease Mother    • Hypertension Maternal Grandmother    • Hyperlipidemia Maternal Grandmother    • Other Maternal Grandmother         GERD   • CALDERON disease Father    • Pancreatitis Father    • Heart disease Maternal Grandfather    • Heart disease Paternal Grandfather    • Breast cancer Neg Hx    • Ovarian cancer Neg Hx    • Uterine cancer Neg Hx    • Colon cancer Neg Hx        SOCIAL HISTORY  Social History     Social History   • Marital status: Single     Spouse name: N/A   • Number of children: 0   • Years of education: N/A     Occupational History   • unemployed      Social History Main Topics   • Smoking status: Never Smoker   • Smokeless tobacco: Never Used   • Alcohol use Yes      Comment: 3 to 4 drinks per month    • Drug use: Yes     Types: Marijuana      Comment: PATIENT STATES LAST USE MARIJUANA YESTERDAY 7/13/18. PATIENT STATES SHE USES MARIJUANA EVERYDAY.   • Sexual activity: Yes     Partners: Male     Birth control/ protection: None     Other Topics Concern   • Not on file     Social History Narrative    Last Pelvic/PAP 2016       ALLERGIES  Patient has no known allergies.    REVIEW OF SYSTEMS  Review of Systems   Unable to perform ROS: Other (Poor historian)       PHYSICAL EXAM  ED Triage Vitals   Temp Heart Rate Resp BP SpO2   09/18/18 0011 09/18/18 0011 09/18/18 0011 09/18/18 0030 09/18/18 0013   98 °F (36.7 °C) 103 18 111/93 100 %      Temp src Heart Rate Source Patient Position BP Location FiO2 (%)   09/18/18 0011 09/18/18 0011 09/18/18 0030 09/18/18 0030 --   Tympanic Monitor Sitting Right arm        Physical Exam   Constitutional: She is oriented to person, place, and time. No distress.   Teeth chattering   HENT:   Head: Normocephalic and atraumatic.   Eyes: Pupils are equal, round, and reactive to light. EOM are normal.   Neck: Normal range of motion. Neck supple.   Cardiovascular: Normal rate, regular rhythm and normal heart sounds.    Pulmonary/Chest: Effort normal and breath  sounds normal. No respiratory distress.   Abdominal: Soft. There is no tenderness. There is no rebound and no guarding.   Musculoskeletal: Normal range of motion. She exhibits no edema.   Neurological: She is alert and oriented to person, place, and time. She has normal sensation and normal strength.   Skin: Skin is warm and dry. No rash noted.   Psychiatric: Mood and affect normal.   Nursing note and vitals reviewed.      LAB RESULTS  Lab Results (last 24 hours)     Procedure Component Value Units Date/Time    CBC & Differential [[id number]] Collected:  09/18/18 0047    Specimen:  Blood Updated:  09/18/18 0135    Narrative:       The following orders were created for panel order CBC & Differential.  Procedure                               Abnormality         Status                     ---------                               -----------         ------                     Scan Slide[[id number]]                                       Final result               CBC Auto Differential[[id number]]        Abnormal            Final result                 Please view results for these tests on the individual orders.    Comprehensive Metabolic Panel [[id number]]  (Abnormal) Collected:  09/18/18 0047    Specimen:  Blood Updated:  09/18/18 0122     Glucose 108 (H) mg/dL      BUN 9 mg/dL      Creatinine 0.66 mg/dL      Sodium 141 mmol/L      Potassium 3.7 mmol/L      Chloride 105 mmol/L      CO2 18.5 (L) mmol/L      Calcium 9.1 mg/dL      Total Protein 7.1 g/dL      Albumin 4.10 g/dL      ALT (SGPT) 14 U/L      AST (SGOT) 16 U/L      Alkaline Phosphatase 58 U/L      Total Bilirubin 0.2 mg/dL      eGFR Non African Amer 113 mL/min/1.73      Globulin 3.0 gm/dL      A/G Ratio 1.4 g/dL      BUN/Creatinine Ratio 13.6     Anion Gap 17.5 mmol/L     Lipase [[id number]]  (Normal) Collected:  09/18/18 0047    Specimen:  Blood Updated:  09/18/18 0122     Lipase 26 U/L     CBC Auto Differential [[id number]]  (Abnormal) Collected:  09/18/18 0047     Specimen:  Blood Updated:  09/18/18 0135     WBC 16.92 (H) 10*3/mm3      RBC 4.53 10*6/mm3      Hemoglobin 10.5 (L) g/dL      Hematocrit 33.8 (L) %      MCV 74.6 (L) fL      MCH 23.2 (L) pg      MCHC 31.1 (L) g/dL      RDW 15.6 (H) %      RDW-SD 42.4 fl      MPV 10.9 fL      Platelets 388 10*3/mm3      Neutrophil % 81.6 (H) %      Lymphocyte % 13.2 (L) %      Monocyte % 4.7 (L) %      Eosinophil % 0.3 %      Basophil % 0.2 %      Immature Grans % 0.2 %      Neutrophils, Absolute 13.81 (H) 10*3/mm3      Lymphocytes, Absolute 2.24 10*3/mm3      Monocytes, Absolute 0.79 10*3/mm3      Eosinophils, Absolute 0.05 10*3/mm3      Basophils, Absolute 0.03 10*3/mm3      Immature Grans, Absolute 0.03 10*3/mm3      nRBC 0.0 /100 WBC     hCG, Serum, Qualitative [[id number]]  (Normal) Collected:  09/18/18 0047    Specimen:  Blood Updated:  09/18/18 0120     HCG Qualitative Negative    Scan Slide [[id number]] Collected:  09/18/18 0047    Specimen:  Blood Updated:  09/18/18 0135     Hypochromia Mod/2+     Ovalocytes Slight/1+     WBC Morphology Normal     Platelet Morphology Normal    Urinalysis With Microscopic If Indicated (No Culture) - Urine, Clean Catch [[id number]]  (Abnormal) Collected:  09/18/18 0221    Specimen:  Urine from Urine, Clean Catch Updated:  09/18/18 0230     Color, UA Yellow     Appearance, UA Clear     pH, UA 7.0     Specific Gravity, UA 1.025     Glucose, UA Negative     Ketones, UA 80 mg/dL (3+) (A)     Bilirubin, UA Negative     Blood, UA Negative     Protein, UA Negative     Leuk Esterase, UA Negative     Nitrite, UA Negative     Urobilinogen, UA 1.0 E.U./dL    Narrative:       Urine microscopic not indicated.          I ordered the above labs and reviewed the results    RADIOLOGY  CT Abdomen Pelvis With Contrast   Final Result   IMPRESSION :    1. 22 mm right ovarian cyst, otherwise unremarkable exam           This report was finalized on 9/18/2018 2:11 AM by YUKI Pressley the  above noted radiological studies. Interpreted by radiologist.  Reviewed by me in PACS.       PROCEDURES  Procedures      PROGRESS AND CONSULTS      0032   Phenergan ordered.    0045   Ordered lab work.     0129  Ordered benadryl.    0140  Ordered CT abd/pelvis and haldol.     0219  Pt recheck. Notified pt of lab work and CT abd/pelvis that right shows small ovarian cyst otherwise unremarkable. Discussed plan to discharge pt home with prescriptions for nausea. Rx for bentyl given. Pt understands and agrees with treatment plan. All concerns addressed.     MEDICAL DECISION MAKING  Results were reviewed/discussed with the patient and they were also made aware of online access. Pt also made aware that some labs, such as cultures, will not be resulted during ER visit and follow up with PMD is necessary.     MDM  Number of Diagnoses or Management Options  Generalized abdominal pain:   Non-intractable vomiting with nausea, unspecified vomiting type:      Amount and/or Complexity of Data Reviewed  Clinical lab tests: reviewed and ordered (WBC 16.9)  Tests in the radiology section of CPT®: reviewed and ordered (CT abd/pelvis shows 22mm right ovarian cyst. )  Decide to obtain previous medical records or to obtain history from someone other than the patient: yes  Review and summarize past medical records: yes (PT had negative US earlier this year. Pt had Negative EGD with Dr. Morrison earlier this year. )           DIAGNOSIS  Final diagnoses:   Generalized abdominal pain   Non-intractable vomiting with nausea, unspecified vomiting type       DISPOSITION  DISCHARGE    Patient discharged in stable condition.    Reviewed implications of results, diagnosis, meds, responsibility to follow up, warning signs and symptoms of possible worsening, potential complications and reasons to return to ER.    Patient/Family voiced understanding of above instructions.    Discussed plan for discharge, as there is no emergent indication for  admission. Patient referred to primary care provider for BP management due to today's BP. Pt/family is agreeable and understands need for follow up and repeat testing.  Pt is aware that discharge does not mean that nothing is wrong but it indicates no emergency is present that requires admission and they must continue care with follow-up as given below or physician of their choice.     FOLLOW-UP  Dania Alston PA  [address] [facility] [id number] [phone]    Schedule an appointment as soon as possible for a visit       [facility] Emergency Department  [address] [facility] [address] [phone]    As needed         Medication List      New Prescriptions    dicyclomine 10 MG capsule  Commonly known as:  BENTYL  Take 1 capsule by mouth 4 (Four) Times a Day Before Meals & at Bedtime.     ondansetron ODT 4 MG disintegrating tablet  Commonly known as:  ZOFRAN-ODT  Take 1 tablet by mouth Every 6 (Six) Hours As Needed for Nausea or   Vomiting.              Latest Documented Vital Signs:  As of 4:44 AM  BP- 162/93 HR- 71 Temp- 98 °F (36.7 °C) (Tympanic) O2 sat- 100%    --  Documentation assistance provided by karrie Ricci for Dr. Sofia.  Information recorded by the scribe was done at my direction and has been verified and validated by me.          Nabil Ricci  09/18/18 0444       Micah Sofia MD  09/18/18 0453

## 2018-09-18 NOTE — ED NOTES
Pt c/o generalized abdominal pain with nausea, vomiting, and diarrhea since 1900 yesterday. Pt was seen in the ED about 5 hours ago. Pt states that her pharmacy was closed last night and that she does not have the money for the prescriptions.      Gay Diego RN  09/18/18 5649

## 2018-09-18 NOTE — DISCHARGE INSTRUCTIONS
Pt instructions:  Rest  [place] diet; increase fluid intake  Follow up with Primary Care Doctor for further management and to have your blood pressure rechecked.   Return to ER with pain, swelling, numbness/tingling, fever, chills, weakness, nausea, vomiting, diarrhea, abdominal pain, back pain, urinary concerns, chest pain, shortness of breath, dizziness, headache, worsening of symptoms or other concerns.

## 2018-09-21 DIAGNOSIS — F41.8 DEPRESSION WITH ANXIETY: ICD-10-CM

## 2018-09-24 ENCOUNTER — OFFICE VISIT (OUTPATIENT)
Dept: FAMILY MEDICINE CLINIC | Facility: CLINIC | Age: 23
End: 2018-09-24

## 2018-09-24 VITALS
HEIGHT: 64 IN | RESPIRATION RATE: 16 BRPM | HEART RATE: 115 BPM | DIASTOLIC BLOOD PRESSURE: 72 MMHG | TEMPERATURE: 98.5 F | BODY MASS INDEX: 33.46 KG/M2 | OXYGEN SATURATION: 98 % | WEIGHT: 196 LBS | SYSTOLIC BLOOD PRESSURE: 110 MMHG

## 2018-09-24 DIAGNOSIS — R11.0 NAUSEA: ICD-10-CM

## 2018-09-24 DIAGNOSIS — F41.8 DEPRESSION WITH ANXIETY: Primary | ICD-10-CM

## 2018-09-24 DIAGNOSIS — R11.2 NON-INTRACTABLE VOMITING WITH NAUSEA, UNSPECIFIED VOMITING TYPE: ICD-10-CM

## 2018-09-24 DIAGNOSIS — G89.29 CHRONIC BACK PAIN, UNSPECIFIED BACK LOCATION, UNSPECIFIED BACK PAIN LATERALITY: ICD-10-CM

## 2018-09-24 DIAGNOSIS — R10.13 EPIGASTRIC PAIN: ICD-10-CM

## 2018-09-24 DIAGNOSIS — R19.7 DIARRHEA, UNSPECIFIED TYPE: ICD-10-CM

## 2018-09-24 DIAGNOSIS — M54.9 CHRONIC BACK PAIN, UNSPECIFIED BACK LOCATION, UNSPECIFIED BACK PAIN LATERALITY: ICD-10-CM

## 2018-09-24 PROCEDURE — 99213 OFFICE O/P EST LOW 20 MIN: CPT | Performed by: PHYSICIAN ASSISTANT

## 2018-09-24 RX ORDER — PAROXETINE 10 MG/1
TABLET, FILM COATED ORAL
Qty: 30 TABLET | Refills: 0 | Status: SHIPPED | OUTPATIENT
Start: 2018-09-24 | End: 2018-10-24 | Stop reason: SDUPTHER

## 2018-09-24 NOTE — PROGRESS NOTES
"Subjective   Alicia Powell is a 22 y.o. female. Presents today for follow up on anxiety.     History of Present Illness     PATIENT STATES SHE WAS UNABLE TO FOLLOW UP WITH GI DUE TO LOSING INSURANCE. STATES SHE DID NOT KNOW HAD INSURANCE BECAUSE WAS WAITING FOR CARD.     WENT TO ER TWICE LAST WEEK. WAS OFF MEDICATION WHEN THIS HAPPENED. THINKS IT WAS WHEN SHE RAN OUT OF MEDICATION. NO EPISODES THAT SHE CAN REMEMBER WHILE ON HER MEDICATION.     HAS BEEN OUT OF PAXIL FOR ABOUT 1 WEEK. STATES SINCE BEING OFF, HAS BEEN WORSE THAN PRIOR TO STARTING MEDICATION. STATES WHEN SHE WAS TAKING MEDICATION, SHE WAS DOING WELL BUT STILL HAD MOMENTS WHEN SHE FELT MOODS WERE DEPRESSED AND THOUGHTS OF SELF HARM BUT NOT NEAR \"THE INTENSITY OR VOLUME IT WAS BEFORE\". USUALLY THOUGHTS INVOLVE KNIVES- STATES STAYS AWAY FROM KITCHEN KNIVES. REPORTS SHE WAS USING HER FINGERNAILS FOR SELF HARM, SO BOYFRIEND WOULD MAKE HER KEEP THEM SHORT. DOES HAVE CONCERN FOR SELF HARM/ SUICIDE. OVER THE PAST FEW DAYS, HAS NOTED BUT STATES BOYFRIEND HAS BEEN HOME TO HELP KEEP STABLE. STATES SHE HAS HAD THIS.     ALSO, REPORTS WANTS TO CONSIDER A BREAST LIFT OR REDUCTION. REPORTS AREAS OF CHAFING UNDER BREASTS AND SEVERE BACK PAIN.     The following portions of the patient's history were reviewed and updated as appropriate: allergies, current medications, past family history, past medical history, past social history, past surgical history and problem list.    Review of Systems   Psychiatric/Behavioral: The patient is nervous/anxious.    All other systems reviewed and are negative.      Objective   Physical Exam   Constitutional: She is oriented to person, place, and time. She appears well-developed and well-nourished.   HENT:   Head: Normocephalic and atraumatic.   Right Ear: External ear normal.   Left Ear: External ear normal.   Nose: Nose normal.   Eyes: Conjunctivae and lids are normal.   Neck: Neck supple. Carotid bruit is not present. "   Cardiovascular: Normal rate, regular rhythm, normal heart sounds and intact distal pulses.  Exam reveals no gallop and no friction rub.    No murmur heard.  Pulmonary/Chest: Effort normal and breath sounds normal. No respiratory distress. She has no wheezes. She has no rhonchi. She has no rales.   Musculoskeletal: She exhibits no edema or deformity.   Neurological: She is alert and oriented to person, place, and time. Gait normal.   Skin: Skin is warm and dry.   Psychiatric: Her speech is normal and behavior is normal. Judgment and thought content normal. Her mood appears anxious. Her affect is not angry, not blunt, not labile and not inappropriate. Cognition and memory are normal. She does not exhibit a depressed mood.   Nursing note and vitals reviewed.      Assessment/Plan   Alicia was seen today for anxiety.    Diagnoses and all orders for this visit:    Depression with anxiety    Epigastric pain    Non-intractable vomiting with nausea, unspecified vomiting type    Diarrhea, unspecified type    Nausea    Chronic back pain, unspecified back location, unspecified back pain laterality      Patient Instructions   22 YEAR OLD FEMALE WHO PRESENTS TODAY IN FOLLOW UP OF DEPRESSION AND ANXIETY AS WELL AS ER/ GI SYMPTOMS. PATIENT REPORTS SHE WAS DOING WELL ON PAXIL BUT FELT LIKE DOSE MAY NEED TO BE INCREASED. SHE OVERALL FELT MUCH BETTER. HOWEVER, SHE RAN OUT OF MEDICATION AND NOTED WORSENING GI SYMPTOMS. PATIENT REPORTS UPON RUNNING OUT OF MEDICATION, SHE WENT TO THE ER A COUPLE TIMES WITH ABDOMINAL PAIN AND VOMITING. WBC WERE FOUND TO BE ELEVATED BUT NO PANCREAS TESTING WAS PERFORMED DESPITE PAIN, VOMITING AND HISTORY OF ELEVATED PANCREAS ENZYMES. SHE RETURNED TO ER AND WBC WAS ELEVATED BUT IMPROVING. THEY CHECKED LIPASE AT THAT TIME, WHICH WAS OK. PATIENT MISSED APPT WITH GI DUE TO INSURANCE LAPSE. SHE THINKS GI SYMPTOMS MAY HAVE BEEN BETTER WITH TREATMENT OF MOODS. I WILL RESTART PAXIL 10 MG ONCE DAILY. SHE SHOULD  FOLLOW UP WITH ME IN 2 WEEKS FOR RE-EVALUATION OF MOODS AND RECHECK LABS OR ASAP IF WORSENING OR NEW SYMPTOMS. I WILL CONSIDER INCREASE TO 20 MG ONCE DAILY AND SEE HER BACK 1 MONTH AFTER CHANGE. SHE SHOULD ALSO SCHEDULE APPT WITH GI ASAP TO BE SEEN.     OF NOTE, PATIENT HAS CHRONIC BACK PAIN THAT SHE IS CONCERNED ABOUT BEING RELATED TO LARGE BREASTS. SHE WOULD LIKE TO CONSIDER BREAST REDUCTION.

## 2018-09-27 NOTE — PATIENT INSTRUCTIONS
22 YEAR OLD FEMALE WHO PRESENTS TODAY IN FOLLOW UP OF DEPRESSION AND ANXIETY AS WELL AS ER/ GI SYMPTOMS. PATIENT REPORTS SHE WAS DOING WELL ON PAXIL BUT FELT LIKE DOSE MAY NEED TO BE INCREASED. SHE OVERALL FELT MUCH BETTER. HOWEVER, SHE RAN OUT OF MEDICATION AND NOTED WORSENING GI SYMPTOMS. PATIENT REPORTS UPON RUNNING OUT OF MEDICATION, SHE WENT TO THE ER A COUPLE TIMES WITH ABDOMINAL PAIN AND VOMITING. WBC WERE FOUND TO BE ELEVATED BUT NO PANCREAS TESTING WAS PERFORMED DESPITE PAIN, VOMITING AND HISTORY OF ELEVATED PANCREAS ENZYMES. SHE RETURNED TO ER AND WBC WAS ELEVATED BUT IMPROVING. THEY CHECKED LIPASE AT THAT TIME, WHICH WAS OK. PATIENT MISSED APPT WITH GI DUE TO INSURANCE LAPSE. SHE THINKS GI SYMPTOMS MAY HAVE BEEN BETTER WITH TREATMENT OF MOODS. I WILL RESTART PAXIL 10 MG ONCE DAILY. SHE SHOULD FOLLOW UP WITH ME IN 2 WEEKS FOR RE-EVALUATION OF MOODS AND RECHECK LABS OR ASAP IF WORSENING OR NEW SYMPTOMS. I WILL CONSIDER INCREASE TO 20 MG ONCE DAILY AND SEE HER BACK 1 MONTH AFTER CHANGE. SHE SHOULD ALSO SCHEDULE APPT WITH GI ASAP TO BE SEEN.     OF NOTE, PATIENT HAS CHRONIC BACK PAIN THAT SHE IS CONCERNED ABOUT BEING RELATED TO LARGE BREASTS. SHE WOULD LIKE TO CONSIDER BREAST REDUCTION.

## 2018-10-24 DIAGNOSIS — F41.8 DEPRESSION WITH ANXIETY: ICD-10-CM

## 2018-10-29 RX ORDER — PAROXETINE 10 MG/1
TABLET, FILM COATED ORAL
Qty: 30 TABLET | Refills: 0 | Status: SHIPPED | OUTPATIENT
Start: 2018-10-29 | End: 2018-10-30 | Stop reason: SDUPTHER

## 2018-10-30 ENCOUNTER — OFFICE VISIT (OUTPATIENT)
Dept: FAMILY MEDICINE CLINIC | Facility: CLINIC | Age: 23
End: 2018-10-30

## 2018-10-30 VITALS
DIASTOLIC BLOOD PRESSURE: 70 MMHG | BODY MASS INDEX: 33.87 KG/M2 | TEMPERATURE: 98.6 F | SYSTOLIC BLOOD PRESSURE: 110 MMHG | HEIGHT: 64 IN | WEIGHT: 198.4 LBS | HEART RATE: 99 BPM | OXYGEN SATURATION: 98 %

## 2018-10-30 DIAGNOSIS — D64.9 ANEMIA, UNSPECIFIED TYPE: ICD-10-CM

## 2018-10-30 DIAGNOSIS — R11.0 NAUSEA: ICD-10-CM

## 2018-10-30 DIAGNOSIS — R21 RASH OF FACE: ICD-10-CM

## 2018-10-30 DIAGNOSIS — R10.13 EPIGASTRIC PAIN: ICD-10-CM

## 2018-10-30 DIAGNOSIS — F41.8 DEPRESSION WITH ANXIETY: Primary | ICD-10-CM

## 2018-10-30 DIAGNOSIS — D72.829 LEUKOCYTOSIS, UNSPECIFIED TYPE: ICD-10-CM

## 2018-10-30 DIAGNOSIS — R11.2 NON-INTRACTABLE VOMITING WITH NAUSEA, UNSPECIFIED VOMITING TYPE: ICD-10-CM

## 2018-10-30 LAB
BILIRUB BLD-MCNC: NEGATIVE MG/DL
CLARITY, POC: CLEAR
COLOR UR: YELLOW
GLUCOSE UR STRIP-MCNC: NEGATIVE MG/DL
KETONES UR QL: NEGATIVE
LEUKOCYTE EST, POC: NEGATIVE
NITRITE UR-MCNC: NEGATIVE MG/ML
PH UR: 5.5 [PH] (ref 5–8)
PROT UR STRIP-MCNC: NEGATIVE MG/DL
RBC # UR STRIP: NEGATIVE /UL
SP GR UR: 1.02 (ref 1–1.03)
UROBILINOGEN UR QL: NORMAL

## 2018-10-30 PROCEDURE — 99214 OFFICE O/P EST MOD 30 MIN: CPT | Performed by: PHYSICIAN ASSISTANT

## 2018-10-30 RX ORDER — PAROXETINE HYDROCHLORIDE 20 MG/1
20 TABLET, FILM COATED ORAL DAILY
Qty: 30 TABLET | Refills: 0 | Status: SHIPPED | OUTPATIENT
Start: 2018-10-30 | End: 2018-11-30 | Stop reason: SDUPTHER

## 2018-10-30 RX ORDER — VALACYCLOVIR HYDROCHLORIDE 1 G/1
1000 TABLET, FILM COATED ORAL 3 TIMES DAILY
Qty: 30 TABLET | Refills: 0 | Status: SHIPPED | OUTPATIENT
Start: 2018-10-30 | End: 2019-10-20

## 2018-10-31 LAB
25(OH)D3+25(OH)D2 SERPL-MCNC: 16.9 NG/ML (ref 30–100)
ALBUMIN SERPL-MCNC: 4.1 G/DL (ref 3.5–5.5)
ALBUMIN/GLOB SERPL: 1.7 {RATIO} (ref 1.2–2.2)
ALP SERPL-CCNC: 59 IU/L (ref 39–117)
ALT SERPL-CCNC: 13 IU/L (ref 0–32)
AST SERPL-CCNC: 13 IU/L (ref 0–40)
BASOPHILS # BLD AUTO: 0 X10E3/UL (ref 0–0.2)
BASOPHILS NFR BLD AUTO: 0 %
BILIRUB SERPL-MCNC: 0.2 MG/DL (ref 0–1.2)
BUN SERPL-MCNC: 8 MG/DL (ref 6–20)
BUN/CREAT SERPL: 12 (ref 9–23)
CALCIUM SERPL-MCNC: 8.8 MG/DL (ref 8.7–10.2)
CHLORIDE SERPL-SCNC: 104 MMOL/L (ref 96–106)
CHOLEST SERPL-MCNC: 163 MG/DL (ref 100–199)
CO2 SERPL-SCNC: 22 MMOL/L (ref 20–29)
CREAT SERPL-MCNC: 0.68 MG/DL (ref 0.57–1)
EOSINOPHIL # BLD AUTO: 0.2 X10E3/UL (ref 0–0.4)
EOSINOPHIL NFR BLD AUTO: 3 %
ERYTHROCYTE [DISTWIDTH] IN BLOOD BY AUTOMATED COUNT: 16.3 % (ref 12.3–15.4)
FERRITIN SERPL-MCNC: 4 NG/ML (ref 15–150)
FOLATE SERPL-MCNC: 3.5 NG/ML
FT4I SERPL CALC-MCNC: 1.4 (ref 1.2–4.9)
GLOBULIN SER CALC-MCNC: 2.4 G/DL (ref 1.5–4.5)
GLUCOSE SERPL-MCNC: 68 MG/DL (ref 65–99)
HCT VFR BLD AUTO: 32.6 % (ref 34–46.6)
HDLC SERPL-MCNC: 38 MG/DL
HGB BLD-MCNC: 10.1 G/DL (ref 11.1–15.9)
IMM GRANULOCYTES # BLD: 0 X10E3/UL (ref 0–0.1)
IMM GRANULOCYTES NFR BLD: 0 %
IRON SATN MFR SERPL: 4 % (ref 15–55)
IRON SERPL-MCNC: 15 UG/DL (ref 27–159)
LDLC SERPL CALC-MCNC: 115 MG/DL (ref 0–99)
LDLC/HDLC SERPL: 3 RATIO (ref 0–3.2)
LYMPHOCYTES # BLD AUTO: 2.5 X10E3/UL (ref 0.7–3.1)
LYMPHOCYTES NFR BLD AUTO: 37 %
MCH RBC QN AUTO: 22.6 PG (ref 26.6–33)
MCHC RBC AUTO-ENTMCNC: 31 G/DL (ref 31.5–35.7)
MCV RBC AUTO: 73 FL (ref 79–97)
MONOCYTES # BLD AUTO: 0.5 X10E3/UL (ref 0.1–0.9)
MONOCYTES NFR BLD AUTO: 7 %
NEUTROPHILS # BLD AUTO: 3.5 X10E3/UL (ref 1.4–7)
NEUTROPHILS NFR BLD AUTO: 53 %
PLATELET # BLD AUTO: 415 X10E3/UL (ref 150–379)
POTASSIUM SERPL-SCNC: 4.2 MMOL/L (ref 3.5–5.2)
PROT SERPL-MCNC: 6.5 G/DL (ref 6–8.5)
RBC # BLD AUTO: 4.47 X10E6/UL (ref 3.77–5.28)
SODIUM SERPL-SCNC: 141 MMOL/L (ref 134–144)
T3RU NFR SERPL: 25 % (ref 24–39)
T4 SERPL-MCNC: 5.5 UG/DL (ref 4.5–12)
TIBC SERPL-MCNC: 364 UG/DL (ref 250–450)
TRIGL SERPL-MCNC: 52 MG/DL (ref 0–149)
TSH SERPL DL<=0.005 MIU/L-ACNC: 1.43 UIU/ML (ref 0.45–4.5)
UIBC SERPL-MCNC: 349 UG/DL (ref 131–425)
VIT B12 SERPL-MCNC: 289 PG/ML (ref 232–1245)
VLDLC SERPL CALC-MCNC: 10 MG/DL (ref 5–40)
WBC # BLD AUTO: 6.7 X10E3/UL (ref 3.4–10.8)

## 2018-11-01 NOTE — PATIENT INSTRUCTIONS
23 YEAR OLD FEMALE WHO PRESENTS TODAY FOR FOLLOW UP OF ANXIETY AND DEPRESSION. HER MOODS HAVE IMPROVED BUT SHE HAS CONTINUED IRRITABILITY. SHE DENIES SI/HI. I WILL INCREASE PAXIL TO 20 MG ONCE DAILY. FOLLOW UP IN 1 MONTH OR ASAP IF WORSENING MOODS.     PATIENT ALSO NOTES LIP LESION THE WEEK PRIOR TO MENSES. IT IS UNCLEAR IF THIS IS AN AREA OF ATOPIC DERMATITIS OR VERY MILD HSV. I WILL HAVE HER TRY AQUAPHOR OR VASOLINE SEVERAL TIMES DAILY. IF NO IMPROVEMENT IN SYMPTOMS, I WILL HAVE HER TAKE VALTREX 1 GRAM DAILY, STARTING THE WEEK PRIOR TO MENSES TO SEE IF THIS PREVENTS LESION. IF NO IMPROVEMENT, I COULD HAVE HER TAKE DAILY FOR 1 MONTH TO SEE IF THIS HELPS.     SHE ALSO DUE FOR RECHECK FASTING LABS WITH ANEMIA AND LEUKOCYTOSIS AS WELL AS ELEVATED PANCREAS ENZYMES IN THE PAST AND INTERMITTENT EPIGASTRIC ABDOMINAL PAIN, NAUSEA,A ND VOMITING. I WILL CHECK LABS TODAY- CALL IF NO RESULTS IN 1 WEEK. FURTHER RECOMMENDATIONS PENDING LABS. PATIENT DID NOT FOLLOW UP WITH GI AS REFERRED. SHE SHOULD SCHEDULE APPT WITH GI ASAP.

## 2018-11-30 ENCOUNTER — OFFICE VISIT (OUTPATIENT)
Dept: FAMILY MEDICINE CLINIC | Facility: CLINIC | Age: 23
End: 2018-11-30

## 2018-11-30 VITALS
DIASTOLIC BLOOD PRESSURE: 70 MMHG | OXYGEN SATURATION: 97 % | HEART RATE: 104 BPM | HEIGHT: 64 IN | BODY MASS INDEX: 33.94 KG/M2 | SYSTOLIC BLOOD PRESSURE: 112 MMHG | TEMPERATURE: 97.9 F | WEIGHT: 198.8 LBS

## 2018-11-30 DIAGNOSIS — E53.8 B12 DEFICIENCY: ICD-10-CM

## 2018-11-30 DIAGNOSIS — F41.8 DEPRESSION WITH ANXIETY: Primary | ICD-10-CM

## 2018-11-30 DIAGNOSIS — D72.829 LEUKOCYTOSIS, UNSPECIFIED TYPE: ICD-10-CM

## 2018-11-30 DIAGNOSIS — D64.9 ANEMIA, UNSPECIFIED TYPE: ICD-10-CM

## 2018-11-30 DIAGNOSIS — E55.9 VITAMIN D DEFICIENCY: ICD-10-CM

## 2018-11-30 PROCEDURE — 99214 OFFICE O/P EST MOD 30 MIN: CPT | Performed by: PHYSICIAN ASSISTANT

## 2018-11-30 PROCEDURE — 96372 THER/PROPH/DIAG INJ SC/IM: CPT | Performed by: PHYSICIAN ASSISTANT

## 2018-11-30 RX ORDER — CYANOCOBALAMIN 1000 UG/ML
1000 INJECTION, SOLUTION INTRAMUSCULAR; SUBCUTANEOUS
Status: DISCONTINUED | OUTPATIENT
Start: 2018-11-30 | End: 2020-01-10

## 2018-11-30 RX ORDER — PAROXETINE 10 MG/1
10 TABLET, FILM COATED ORAL DAILY
Qty: 30 TABLET | Refills: 1 | Status: SHIPPED | OUTPATIENT
Start: 2018-11-30 | End: 2019-10-20

## 2018-11-30 RX ADMIN — CYANOCOBALAMIN 1000 MCG: 1000 INJECTION, SOLUTION INTRAMUSCULAR; SUBCUTANEOUS at 14:38

## 2018-12-01 LAB
BASOPHILS # BLD AUTO: 0 X10E3/UL (ref 0–0.2)
BASOPHILS NFR BLD AUTO: 0 %
EOSINOPHIL # BLD AUTO: 0.2 X10E3/UL (ref 0–0.4)
EOSINOPHIL NFR BLD AUTO: 3 %
ERYTHROCYTE [DISTWIDTH] IN BLOOD BY AUTOMATED COUNT: 16.6 % (ref 12.3–15.4)
FERRITIN SERPL-MCNC: 4 NG/ML (ref 15–150)
FOLATE SERPL-MCNC: 5 NG/ML
HCT VFR BLD AUTO: 33.5 % (ref 34–46.6)
HGB BLD-MCNC: 10.4 G/DL (ref 11.1–15.9)
IMM GRANULOCYTES # BLD: 0 X10E3/UL (ref 0–0.1)
IMM GRANULOCYTES NFR BLD: 0 %
IRON SATN MFR SERPL: 3 % (ref 15–55)
IRON SERPL-MCNC: 11 UG/DL (ref 27–159)
LYMPHOCYTES # BLD AUTO: 2.9 X10E3/UL (ref 0.7–3.1)
LYMPHOCYTES NFR BLD AUTO: 34 %
MCH RBC QN AUTO: 22.2 PG (ref 26.6–33)
MCHC RBC AUTO-ENTMCNC: 31 G/DL (ref 31.5–35.7)
MCV RBC AUTO: 72 FL (ref 79–97)
MONOCYTES # BLD AUTO: 0.8 X10E3/UL (ref 0.1–0.9)
MONOCYTES NFR BLD AUTO: 9 %
NEUTROPHILS # BLD AUTO: 4.6 X10E3/UL (ref 1.4–7)
NEUTROPHILS NFR BLD AUTO: 54 %
PLATELET # BLD AUTO: 418 X10E3/UL (ref 150–379)
RBC # BLD AUTO: 4.68 X10E6/UL (ref 3.77–5.28)
TIBC SERPL-MCNC: 384 UG/DL (ref 250–450)
UIBC SERPL-MCNC: 373 UG/DL (ref 131–425)
VIT B12 SERPL-MCNC: 313 PG/ML (ref 232–1245)
WBC # BLD AUTO: 8.5 X10E3/UL (ref 3.4–10.8)

## 2018-12-02 RX ORDER — ERGOCALCIFEROL 1.25 MG/1
50000 CAPSULE ORAL WEEKLY
Qty: 4 CAPSULE | Refills: 3 | Status: SHIPPED | OUTPATIENT
Start: 2018-12-02 | End: 2019-10-20

## 2018-12-03 ENCOUNTER — HOSPITAL ENCOUNTER (EMERGENCY)
Facility: HOSPITAL | Age: 23
Discharge: HOME | End: 2018-12-03
Attending: EMERGENCY MEDICINE | Admitting: EMERGENCY MEDICINE

## 2018-12-03 VITALS
HEART RATE: 72 BPM | TEMPERATURE: 98 F | SYSTOLIC BLOOD PRESSURE: 148 MMHG | RESPIRATION RATE: 18 BRPM | WEIGHT: 200 LBS | DIASTOLIC BLOOD PRESSURE: 105 MMHG | HEIGHT: 64 IN | OXYGEN SATURATION: 100 % | BODY MASS INDEX: 34.15 KG/M2

## 2018-12-03 DIAGNOSIS — R11.2 NON-INTRACTABLE VOMITING WITH NAUSEA, UNSPECIFIED VOMITING TYPE: Primary | ICD-10-CM

## 2018-12-03 DIAGNOSIS — R10.13 ACUTE EPIGASTRIC PAIN: ICD-10-CM

## 2018-12-03 LAB
ALBUMIN SERPL-MCNC: 4.3 G/DL (ref 3.5–5.2)
ALBUMIN/GLOB SERPL: 1.5 G/DL
ALP SERPL-CCNC: 60 U/L (ref 39–117)
ALT SERPL W P-5'-P-CCNC: 19 U/L (ref 1–33)
ANION GAP SERPL CALCULATED.3IONS-SCNC: 12.8 MMOL/L
AST SERPL-CCNC: 20 U/L (ref 1–32)
BASOPHILS # BLD AUTO: 0.03 10*3/MM3 (ref 0–0.2)
BASOPHILS NFR BLD AUTO: 0.2 % (ref 0–1.5)
BILIRUB SERPL-MCNC: 0.3 MG/DL (ref 0.1–1.2)
BUN BLD-MCNC: 12 MG/DL (ref 6–20)
BUN/CREAT SERPL: 15.8 (ref 7–25)
CALCIUM SPEC-SCNC: 9.2 MG/DL (ref 8.6–10.5)
CHLORIDE SERPL-SCNC: 103 MMOL/L (ref 98–107)
CO2 SERPL-SCNC: 21.2 MMOL/L (ref 22–29)
CREAT BLD-MCNC: 0.76 MG/DL (ref 0.57–1)
DEPRECATED RDW RBC AUTO: 41.6 FL (ref 37–54)
ELLIPTOCYTES BLD QL SMEAR: NORMAL
EOSINOPHIL # BLD AUTO: 0.01 10*3/MM3 (ref 0–0.7)
EOSINOPHIL NFR BLD AUTO: 0.1 % (ref 0.3–6.2)
ERYTHROCYTE [DISTWIDTH] IN BLOOD BY AUTOMATED COUNT: 16 % (ref 11.7–13)
GFR SERPL CREATININE-BSD FRML MDRD: 95 ML/MIN/1.73
GLOBULIN UR ELPH-MCNC: 2.8 GM/DL
GLUCOSE BLD-MCNC: 137 MG/DL (ref 65–99)
HCG SERPL QL: NEGATIVE
HCT VFR BLD AUTO: 40.8 % (ref 35.6–45.5)
HGB BLD-MCNC: 12.6 G/DL (ref 11.9–15.5)
HYPOCHROMIA BLD QL: NORMAL
IMM GRANULOCYTES # BLD: 0.03 10*3/MM3 (ref 0–0.03)
IMM GRANULOCYTES NFR BLD: 0.2 % (ref 0–0.5)
LIPASE SERPL-CCNC: 22 U/L (ref 13–60)
LYMPHOCYTES # BLD AUTO: 2.02 10*3/MM3 (ref 0.9–4.8)
LYMPHOCYTES NFR BLD AUTO: 12.6 % (ref 19.6–45.3)
MCH RBC QN AUTO: 22.3 PG (ref 26.9–32)
MCHC RBC AUTO-ENTMCNC: 30.9 G/DL (ref 32.4–36.3)
MCV RBC AUTO: 72.1 FL (ref 80.5–98.2)
MICROCYTES BLD QL: NORMAL
MONOCYTES # BLD AUTO: 0.58 10*3/MM3 (ref 0.2–1.2)
MONOCYTES NFR BLD AUTO: 3.6 % (ref 5–12)
NEUTROPHILS # BLD AUTO: 13.43 10*3/MM3 (ref 1.9–8.1)
NEUTROPHILS NFR BLD AUTO: 83.5 % (ref 42.7–76)
PLAT MORPH BLD: NORMAL
PLATELET # BLD AUTO: 431 10*3/MM3 (ref 140–500)
PMV BLD AUTO: 11.3 FL (ref 6–12)
POTASSIUM BLD-SCNC: 3.6 MMOL/L (ref 3.5–5.2)
PROT SERPL-MCNC: 7.1 G/DL (ref 6–8.5)
RBC # BLD AUTO: 5.66 10*6/MM3 (ref 3.9–5.2)
SODIUM BLD-SCNC: 137 MMOL/L (ref 136–145)
WBC MORPH BLD: NORMAL
WBC NRBC COR # BLD: 16.07 10*3/MM3 (ref 4.5–10.7)

## 2018-12-03 PROCEDURE — 84703 CHORIONIC GONADOTROPIN ASSAY: CPT | Performed by: EMERGENCY MEDICINE

## 2018-12-03 PROCEDURE — 25010000002 PROMETHAZINE PER 50 MG: Performed by: EMERGENCY MEDICINE

## 2018-12-03 PROCEDURE — 99284 EMERGENCY DEPT VISIT MOD MDM: CPT

## 2018-12-03 PROCEDURE — 80053 COMPREHEN METABOLIC PANEL: CPT | Performed by: EMERGENCY MEDICINE

## 2018-12-03 PROCEDURE — 96361 HYDRATE IV INFUSION ADD-ON: CPT

## 2018-12-03 PROCEDURE — 85025 COMPLETE CBC W/AUTO DIFF WBC: CPT | Performed by: EMERGENCY MEDICINE

## 2018-12-03 PROCEDURE — 25010000002 KETOROLAC TROMETHAMINE PER 15 MG: Performed by: EMERGENCY MEDICINE

## 2018-12-03 PROCEDURE — 85007 BL SMEAR W/DIFF WBC COUNT: CPT | Performed by: EMERGENCY MEDICINE

## 2018-12-03 PROCEDURE — 83690 ASSAY OF LIPASE: CPT | Performed by: EMERGENCY MEDICINE

## 2018-12-03 PROCEDURE — 96375 TX/PRO/DX INJ NEW DRUG ADDON: CPT

## 2018-12-03 PROCEDURE — 25010000002 HYDROMORPHONE PER 4 MG: Performed by: EMERGENCY MEDICINE

## 2018-12-03 PROCEDURE — 96374 THER/PROPH/DIAG INJ IV PUSH: CPT

## 2018-12-03 RX ORDER — ONDANSETRON 4 MG/1
4 TABLET, ORALLY DISINTEGRATING ORAL ONCE
Status: COMPLETED | OUTPATIENT
Start: 2018-12-03 | End: 2018-12-03

## 2018-12-03 RX ORDER — PANTOPRAZOLE SODIUM 40 MG/1
40 TABLET, DELAYED RELEASE ORAL DAILY
Qty: 21 TABLET | Refills: 0 | Status: SHIPPED | OUTPATIENT
Start: 2018-12-03 | End: 2019-10-20

## 2018-12-03 RX ORDER — KETOROLAC TROMETHAMINE 15 MG/ML
10 INJECTION, SOLUTION INTRAMUSCULAR; INTRAVENOUS ONCE
Status: COMPLETED | OUTPATIENT
Start: 2018-12-03 | End: 2018-12-03

## 2018-12-03 RX ORDER — PROMETHAZINE HYDROCHLORIDE 25 MG/ML
6.25 INJECTION, SOLUTION INTRAMUSCULAR; INTRAVENOUS ONCE
Status: COMPLETED | OUTPATIENT
Start: 2018-12-03 | End: 2018-12-03

## 2018-12-03 RX ORDER — ONDANSETRON 4 MG/1
4 TABLET, FILM COATED ORAL EVERY 6 HOURS PRN
Qty: 15 TABLET | Refills: 0 | Status: SHIPPED | OUTPATIENT
Start: 2018-12-03 | End: 2019-01-02

## 2018-12-03 RX ORDER — SODIUM CHLORIDE 0.9 % (FLUSH) 0.9 %
10 SYRINGE (ML) INJECTION AS NEEDED
Status: DISCONTINUED | OUTPATIENT
Start: 2018-12-03 | End: 2018-12-04 | Stop reason: HOSPADM

## 2018-12-03 RX ORDER — DICYCLOMINE HYDROCHLORIDE 10 MG/1
10 CAPSULE ORAL EVERY 6 HOURS PRN
Qty: 30 CAPSULE | Refills: 0 | Status: SHIPPED | OUTPATIENT
Start: 2018-12-03 | End: 2019-10-20

## 2018-12-03 RX ORDER — HYDROMORPHONE HYDROCHLORIDE 1 MG/ML
0.5 INJECTION, SOLUTION INTRAMUSCULAR; INTRAVENOUS; SUBCUTANEOUS ONCE
Status: COMPLETED | OUTPATIENT
Start: 2018-12-03 | End: 2018-12-03

## 2018-12-03 RX ORDER — FAMOTIDINE 10 MG/ML
20 INJECTION, SOLUTION INTRAVENOUS ONCE
Status: COMPLETED | OUTPATIENT
Start: 2018-12-03 | End: 2018-12-03

## 2018-12-03 RX ADMIN — HYDROMORPHONE HYDROCHLORIDE 0.5 MG: 1 INJECTION, SOLUTION INTRAMUSCULAR; INTRAVENOUS; SUBCUTANEOUS at 22:35

## 2018-12-03 RX ADMIN — FAMOTIDINE 20 MG: 10 INJECTION, SOLUTION INTRAVENOUS at 20:28

## 2018-12-03 RX ADMIN — PROMETHAZINE HYDROCHLORIDE 6.25 MG: 25 INJECTION INTRAMUSCULAR; INTRAVENOUS at 20:27

## 2018-12-03 RX ADMIN — KETOROLAC TROMETHAMINE 10 MG: 15 INJECTION, SOLUTION INTRAMUSCULAR; INTRAVENOUS at 20:29

## 2018-12-03 RX ADMIN — SODIUM CHLORIDE, POTASSIUM CHLORIDE, SODIUM LACTATE AND CALCIUM CHLORIDE 1000 ML: 600; 310; 30; 20 INJECTION, SOLUTION INTRAVENOUS at 20:26

## 2018-12-03 RX ADMIN — ONDANSETRON 4 MG: 4 TABLET, ORALLY DISINTEGRATING ORAL at 22:34

## 2018-12-04 ENCOUNTER — APPOINTMENT (OUTPATIENT)
Dept: CT IMAGING | Facility: HOSPITAL | Age: 23
End: 2018-12-04

## 2018-12-04 ENCOUNTER — TELEPHONE (OUTPATIENT)
Dept: FAMILY MEDICINE CLINIC | Facility: CLINIC | Age: 23
End: 2018-12-04

## 2018-12-04 ENCOUNTER — HOSPITAL ENCOUNTER (EMERGENCY)
Facility: HOSPITAL | Age: 23
Discharge: HOME | End: 2018-12-04
Attending: EMERGENCY MEDICINE | Admitting: EMERGENCY MEDICINE

## 2018-12-04 VITALS
SYSTOLIC BLOOD PRESSURE: 152 MMHG | TEMPERATURE: 98.1 F | HEART RATE: 78 BPM | RESPIRATION RATE: 18 BRPM | OXYGEN SATURATION: 98 % | DIASTOLIC BLOOD PRESSURE: 75 MMHG | WEIGHT: 200 LBS | HEIGHT: 64 IN | BODY MASS INDEX: 34.15 KG/M2

## 2018-12-04 DIAGNOSIS — R10.84 GENERALIZED ABDOMINAL PAIN: ICD-10-CM

## 2018-12-04 DIAGNOSIS — R11.2 NAUSEA AND VOMITING, INTRACTABILITY OF VOMITING NOT SPECIFIED, UNSPECIFIED VOMITING TYPE: Primary | ICD-10-CM

## 2018-12-04 LAB
ALBUMIN SERPL-MCNC: 4.5 G/DL (ref 3.5–5.2)
ALBUMIN/GLOB SERPL: 1.7 G/DL
ALP SERPL-CCNC: 61 U/L (ref 39–117)
ALT SERPL W P-5'-P-CCNC: 16 U/L (ref 1–33)
ANION GAP SERPL CALCULATED.3IONS-SCNC: 14.7 MMOL/L
AST SERPL-CCNC: 16 U/L (ref 1–32)
BASOPHILS # BLD AUTO: 0.01 10*3/MM3 (ref 0–0.2)
BASOPHILS NFR BLD AUTO: 0.1 % (ref 0–1.5)
BILIRUB SERPL-MCNC: 0.3 MG/DL (ref 0.1–1.2)
BUN BLD-MCNC: 10 MG/DL (ref 6–20)
BUN/CREAT SERPL: 13.9 (ref 7–25)
CALCIUM SPEC-SCNC: 9.4 MG/DL (ref 8.6–10.5)
CHLORIDE SERPL-SCNC: 103 MMOL/L (ref 98–107)
CO2 SERPL-SCNC: 23.3 MMOL/L (ref 22–29)
CREAT BLD-MCNC: 0.72 MG/DL (ref 0.57–1)
DEPRECATED RDW RBC AUTO: 42.4 FL (ref 37–54)
EOSINOPHIL # BLD AUTO: 0 10*3/MM3 (ref 0–0.7)
EOSINOPHIL NFR BLD AUTO: 0 % (ref 0.3–6.2)
ERYTHROCYTE [DISTWIDTH] IN BLOOD BY AUTOMATED COUNT: 15.8 % (ref 11.7–13)
GFR SERPL CREATININE-BSD FRML MDRD: 101 ML/MIN/1.73
GLOBULIN UR ELPH-MCNC: 2.7 GM/DL
GLUCOSE BLD-MCNC: 128 MG/DL (ref 65–99)
HCT VFR BLD AUTO: 38.1 % (ref 35.6–45.5)
HGB BLD-MCNC: 11.4 G/DL (ref 11.9–15.5)
IMM GRANULOCYTES # BLD: 0.04 10*3/MM3 (ref 0–0.03)
IMM GRANULOCYTES NFR BLD: 0.3 % (ref 0–0.5)
LIPASE SERPL-CCNC: 38 U/L (ref 13–60)
LYMPHOCYTES # BLD AUTO: 1.91 10*3/MM3 (ref 0.9–4.8)
LYMPHOCYTES NFR BLD AUTO: 12.5 % (ref 19.6–45.3)
MCH RBC QN AUTO: 21.9 PG (ref 26.9–32)
MCHC RBC AUTO-ENTMCNC: 29.9 G/DL (ref 32.4–36.3)
MCV RBC AUTO: 73.1 FL (ref 80.5–98.2)
MONOCYTES # BLD AUTO: 0.69 10*3/MM3 (ref 0.2–1.2)
MONOCYTES NFR BLD AUTO: 4.5 % (ref 5–12)
NEUTROPHILS # BLD AUTO: 12.66 10*3/MM3 (ref 1.9–8.1)
NEUTROPHILS NFR BLD AUTO: 82.9 % (ref 42.7–76)
PLATELET # BLD AUTO: 446 10*3/MM3 (ref 140–500)
PMV BLD AUTO: 11 FL (ref 6–12)
POTASSIUM BLD-SCNC: 4.1 MMOL/L (ref 3.5–5.2)
PROT SERPL-MCNC: 7.2 G/DL (ref 6–8.5)
RBC # BLD AUTO: 5.21 10*6/MM3 (ref 3.9–5.2)
SODIUM BLD-SCNC: 141 MMOL/L (ref 136–145)
WBC NRBC COR # BLD: 15.27 10*3/MM3 (ref 4.5–10.7)

## 2018-12-04 PROCEDURE — 25010000002 PROMETHAZINE PER 50 MG: Performed by: EMERGENCY MEDICINE

## 2018-12-04 PROCEDURE — 96375 TX/PRO/DX INJ NEW DRUG ADDON: CPT

## 2018-12-04 PROCEDURE — 74177 CT ABD & PELVIS W/CONTRAST: CPT

## 2018-12-04 PROCEDURE — 96374 THER/PROPH/DIAG INJ IV PUSH: CPT

## 2018-12-04 PROCEDURE — 85025 COMPLETE CBC W/AUTO DIFF WBC: CPT | Performed by: EMERGENCY MEDICINE

## 2018-12-04 PROCEDURE — 25010000002 HALOPERIDOL LACTATE PER 5 MG: Performed by: EMERGENCY MEDICINE

## 2018-12-04 PROCEDURE — 80053 COMPREHEN METABOLIC PANEL: CPT | Performed by: EMERGENCY MEDICINE

## 2018-12-04 PROCEDURE — 25010000002 IOPAMIDOL 61 % SOLUTION: Performed by: EMERGENCY MEDICINE

## 2018-12-04 PROCEDURE — 99283 EMERGENCY DEPT VISIT LOW MDM: CPT

## 2018-12-04 PROCEDURE — 83690 ASSAY OF LIPASE: CPT | Performed by: EMERGENCY MEDICINE

## 2018-12-04 RX ORDER — HALOPERIDOL 5 MG/ML
2 INJECTION INTRAMUSCULAR ONCE
Status: COMPLETED | OUTPATIENT
Start: 2018-12-04 | End: 2018-12-04

## 2018-12-04 RX ORDER — PROMETHAZINE HYDROCHLORIDE 25 MG/ML
12.5 INJECTION, SOLUTION INTRAMUSCULAR; INTRAVENOUS ONCE
Status: COMPLETED | OUTPATIENT
Start: 2018-12-04 | End: 2018-12-04

## 2018-12-04 RX ORDER — PROMETHAZINE HYDROCHLORIDE 25 MG/1
25 TABLET ORAL EVERY 6 HOURS PRN
Qty: 15 TABLET | Refills: 0 | Status: SHIPPED | OUTPATIENT
Start: 2018-12-04 | End: 2019-10-20

## 2018-12-04 RX ADMIN — SODIUM CHLORIDE 1000 ML: 9 INJECTION, SOLUTION INTRAVENOUS at 08:32

## 2018-12-04 RX ADMIN — IOPAMIDOL 85 ML: 612 INJECTION, SOLUTION INTRAVENOUS at 09:26

## 2018-12-04 RX ADMIN — HALOPERIDOL LACTATE 2 MG: 5 INJECTION, SOLUTION INTRAMUSCULAR at 10:22

## 2018-12-04 RX ADMIN — PROMETHAZINE HYDROCHLORIDE 12.5 MG: 25 INJECTION INTRAMUSCULAR; INTRAVENOUS at 08:32

## 2018-12-04 NOTE — DISCHARGE INSTRUCTIONS
Take medications as prescribed.  Return to the emergency department for worsening pain, persistent vomiting, fever, or other concern.  Drink plenty of fluids.  Follow-up your primary care provider later this week if symptoms persist.

## 2018-12-04 NOTE — ED PROVIDER NOTES
" EMERGENCY DEPARTMENT ENCOUNTER    CHIEF COMPLAINT  Chief Complaint: Vomiting   History given by: Pt  History limited by: Nothing  Room Number: 05/05  PMD: Dania Alston PA      HPI:  Pt is a 23 y.o. female with a h/o GERD who presents complaining of vomiting (inumerous amount of times) which began at 1700, and her abd pain began after the vomiting. The pt confirms nausea, diarrhea (x1 week).     The pt states that she has not taken her GERD medication (pepcid).     Duration:  3 hours  Onset: Gradual  Timing: Intermittent  Location: GI  Quality: \"pain\"  Intensity/Severity: Moderate  Progression: No change  Associated Symptoms: Diarrhea, nausea, abd pain  Aggravating Factors: Unknown  Alleviating Factors: Unknown  Treatment before arrival: None    PAST MEDICAL HISTORY  Active Ambulatory Problems     Diagnosis Date Noted   • PCOS (polycystic ovarian syndrome) 05/06/2016   • Hirsutism 05/06/2016   • Abnormal uterine bleeding (AUB) 05/06/2016   • Obesity 05/06/2016   • Nausea 07/13/2018   • Pain of upper abdomen 07/13/2018   • Non-intractable vomiting with nausea 07/13/2018   • ASCUS of cervix with negative high risk HPV 08/01/2018     Resolved Ambulatory Problems     Diagnosis Date Noted   • No Resolved Ambulatory Problems     Past Medical History:   Diagnosis Date   • Anemia    • Anxiety    • ASCUS of cervix with negative high risk HPV 8/1/2018   • Asthma    • Depression    • Dizziness    • GERD (gastroesophageal reflux disease)    • Multiple URI    • PCOS (polycystic ovarian syndrome) 5/6/2016   • Pneumonia    • Viral infection        PAST SURGICAL HISTORY  Past Surgical History:   Procedure Laterality Date   • VAGINAL SEPTUM RESECTION  2012    Excision   • WISDOM TOOTH EXTRACTION  02/2016       FAMILY HISTORY  Family History   Problem Relation Age of Onset   • Anxiety disorder Mother    • Depression Mother    • CALDERON disease Mother    • Hypertension Maternal Grandmother    • Hyperlipidemia Maternal Grandmother    • " Other Maternal Grandmother         GERD   • CALDERON disease Father    • Pancreatitis Father    • Heart disease Maternal Grandfather    • Heart disease Paternal Grandfather    • Breast cancer Neg Hx    • Ovarian cancer Neg Hx    • Uterine cancer Neg Hx    • Colon cancer Neg Hx        SOCIAL HISTORY  Social History     Socioeconomic History   • Marital status: Single     Spouse name: Not on file   • Number of children: 0   • Years of education: Not on file   • Highest education level: Not on file   Social Needs   • Financial resource strain: Not on file   • Food insecurity - worry: Not on file   • Food insecurity - inability: Not on file   • Transportation needs - medical: Not on file   • Transportation needs - non-medical: Not on file   Occupational History   • Occupation: unemployed   Tobacco Use   • Smoking status: Never Smoker   • Smokeless tobacco: Never Used   Substance and Sexual Activity   • Alcohol use: Yes     Comment: 3 to 4 drinks per month    • Drug use: Yes     Types: Marijuana     Comment: PATIENT STATES LAST USE MARIJUANA YESTERDAY 7/13/18. PATIENT STATES SHE USES MARIJUANA EVERYDAY.   • Sexual activity: Yes     Partners: Male     Birth control/protection: None   Other Topics Concern   • Not on file   Social History Narrative    Last Pelvic/PAP 2016       ALLERGIES  Patient has no known allergies.    REVIEW OF SYSTEMS  Review of Systems   Constitutional: Negative for fever.   HENT: Negative for sore throat.    Eyes: Negative.    Respiratory: Negative for cough and shortness of breath.    Cardiovascular: Negative for chest pain.   Gastrointestinal: Positive for abdominal pain, diarrhea, nausea and vomiting.   Genitourinary: Negative for dysuria.   Musculoskeletal: Negative for neck pain.   Skin: Negative for rash.   Allergic/Immunologic: Negative.    Neurological: Negative for weakness, numbness and headaches.   Hematological: Negative.    Psychiatric/Behavioral: Negative.    All other systems reviewed and  are negative.      PHYSICAL EXAM  ED Triage Vitals [12/03/18 1823]   Temp Heart Rate Resp BP SpO2   98 °F (36.7 °C) 112 20 -- 100 %      Temp src Heart Rate Source Patient Position BP Location FiO2 (%)   Tympanic Monitor -- -- --       Physical Exam   Constitutional: She is oriented to person, place, and time. No distress.   HENT:   Head: Normocephalic and atraumatic.   Mouth/Throat: Mucous membranes are not dry.   Eyes: EOM are normal. Pupils are equal, round, and reactive to light.   Neck: Normal range of motion. Neck supple.   Cardiovascular: Normal rate, regular rhythm and normal heart sounds.   Pulmonary/Chest: Effort normal and breath sounds normal. No respiratory distress.   Abdominal: Soft. There is tenderness in the epigastric area. There is no rebound, no guarding and no CVA tenderness.   Musculoskeletal: Normal range of motion. She exhibits no edema.   Neurological: She is alert and oriented to person, place, and time. She has normal sensation and normal strength.   Skin: Skin is warm and dry. No rash noted.   Psychiatric: Mood and affect normal.   Nursing note and vitals reviewed.      LAB RESULTS  Lab Results (last 24 hours)     Procedure Component Value Units Date/Time    CBC & Differential [[id number]] Collected:  12/03/18 2034    Specimen:  Blood Updated:  12/03/18 2125    Narrative:       The following orders were created for panel order CBC & Differential.  Procedure                               Abnormality         Status                     ---------                               -----------         ------                     Scan Slide[[id number]]                                       Final result               CBC Auto Differential[[id number]]        Abnormal            Final result                 Please view results for these tests on the individual orders.    Comprehensive Metabolic Panel [[id number]]  (Abnormal) Collected:  12/03/18 2034    Specimen:  Blood from Arm, Right Updated:  12/03/18  2113     Glucose 137 mg/dL      BUN 12 mg/dL      Creatinine 0.76 mg/dL      Sodium 137 mmol/L      Potassium 3.6 mmol/L      Chloride 103 mmol/L      CO2 21.2 mmol/L      Calcium 9.2 mg/dL      Total Protein 7.1 g/dL      Albumin 4.30 g/dL      ALT (SGPT) 19 U/L      AST (SGOT) 20 U/L      Alkaline Phosphatase 60 U/L      Total Bilirubin 0.3 mg/dL      eGFR Non African Amer 95 mL/min/1.73      Globulin 2.8 gm/dL      A/G Ratio 1.5 g/dL      BUN/Creatinine Ratio 15.8     Anion Gap 12.8 mmol/L     Lipase [[id number]]  (Normal) Collected:  12/03/18 2034    Specimen:  Blood from Arm, Right Updated:  12/03/18 2113     Lipase 22 U/L     hCG, Serum, Qualitative [[id number]]  (Normal) Collected:  12/03/18 2034    Specimen:  Blood from Arm, Right Updated:  12/03/18 2138     HCG Qualitative Negative    CBC Auto Differential [[id number]]  (Abnormal) Collected:  12/03/18 2034    Specimen:  Blood from Arm, Right Updated:  12/03/18 2125     WBC 16.07 10*3/mm3      RBC 5.66 10*6/mm3      Hemoglobin 12.6 g/dL      Hematocrit 40.8 %      MCV 72.1 fL      MCH 22.3 pg      MCHC 30.9 g/dL      RDW 16.0 %      RDW-SD 41.6 fl      MPV 11.3 fL      Platelets 431 10*3/mm3      Neutrophil % 83.5 %      Lymphocyte % 12.6 %      Monocyte % 3.6 %      Eosinophil % 0.1 %      Basophil % 0.2 %      Immature Grans % 0.2 %      Neutrophils, Absolute 13.43 10*3/mm3      Lymphocytes, Absolute 2.02 10*3/mm3      Monocytes, Absolute 0.58 10*3/mm3      Eosinophils, Absolute 0.01 10*3/mm3      Basophils, Absolute 0.03 10*3/mm3      Immature Grans, Absolute 0.03 10*3/mm3     Scan Slide [[id number]] Collected:  12/03/18 2034    Specimen:  Blood from Arm, Right Updated:  12/03/18 2125     Elliptocytes Slight/1+     Hypochromia Mod/2+     Microcytes Mod/2+     WBC Morphology Normal     Platelet Morphology Normal          I ordered the above labs and reviewed the results    PROCEDURES  Procedures      PROGRESS AND CONSULTS  ED Course as of Dec 03 2333   Mon Dec  03, 2018   2300 Patient has been able to drink water without any vomiting.  [WH]      ED Course User Index  [WH] Micah Will MD     2004 Ordered CMP, lipase, hCG, UA, CBC for further evaluation. Ordered toradol and pepcid for pain. Ordered phenergan for nausea.     2201 Rechecked the pt who has not vomited and is feeling somewhat improved. Discussed the pt's labs including elevated WBC. Discussed the plan to discharge the pt with medication for nausea. The pt agrees with the plan and all questions were addressed.     2209 Ordered Zofran for nausea.     2219 Ordered dilaudid for pain.     MEDICAL DECISION MAKING  Results were reviewed/discussed with the patient and they were also made aware of online access. Pt also made aware that some labs, such as cultures, will not be resulted during ER visit and follow up with PMD is necessary.     MDM  Number of Diagnoses or Management Options  Acute epigastric pain:   Non-intractable vomiting with nausea, unspecified vomiting type:      Amount and/or Complexity of Data Reviewed  Clinical lab tests: ordered and reviewed (WBC: 16.07)  Decide to obtain previous medical records or to obtain history from someone other than the patient: yes  Review and summarize past medical records: yes (The pt had an upper endoscopy in July 2018 which was normal. The pt has had multiple prior ER visits this year for N/V and abd pain. )    Patient Progress  Patient progress: stable         DIAGNOSIS  Final diagnoses:   Non-intractable vomiting with nausea, unspecified vomiting type   Acute epigastric pain       DISPOSITION  Disposition: Discharged.    Patient discharged in stable condition.    Reviewed implications of results, diagnosis, meds, responsibility to follow up, warning signs and symptoms of possible worsening, potential complications and reasons to return to ER, including new or worsening symptoms.    Patient/Family voiced understanding of above instructions.    Discussed plan  for discharge, as there is no emergent indication for admission.  Pt/family is agreeable and understands need for follow up and repeat testing.  Pt is aware that discharge does not mean that nothing is wrong but it indicates no emergency is present and they must continue care with follow-up as given below or physician of their choice.     FOLLOW-UP  Dania Alston PA  [address] [facility] [address]  [phone]    Call in 3 days  If symptoms persist         Medication List      New Prescriptions    ondansetron 4 MG tablet  Commonly known as:  ZOFRAN  Take 1 tablet by mouth Every 6 (Six) Hours As Needed for Nausea or   Vomiting.     pantoprazole 40 MG EC tablet  Commonly known as:  PROTONIX  Take 1 tablet by mouth Daily.        Changed    dicyclomine 10 MG capsule  Commonly known as:  BENTYL  Take 1 capsule by mouth Every 6 (Six) Hours As Needed (abdominal cramps).  What changed:    when to take this  reasons to take this        Stop    famotidine 20 MG tablet  Commonly known as:  PEPCID            Latest Documented Vital Signs:  As of 11:33 PM  BP- (!) 148/105 HR- 72 Temp- 98 °F (36.7 °C) (Tympanic) O2 sat- 100%    --  Documentation assistance provided by karrie Mcdowell for Dr. Will.  Information recorded by the scribe was done at my direction and has been verified and validated by me.         America Mcdowell  12/03/18 9510       Micah Will MD  12/03/18 0782

## 2018-12-04 NOTE — TELEPHONE ENCOUNTER
PLEASE CALL PATIENT. SHE NEEDS TO SEE GI ASAP. I HAVE ASKED NUMEROUS TIMES THAT SHE SCHEDULE FOLLOW UP WITH THEM. I ASKED AGAIN 11/30/18 THAT SHE CALL ASAP FOR GI FOLLOW UP. I DO NOT SEE THAT SHE HAS SCHEDULED GI FOLLOW UP. THERE IS NOT MUCH I CAN DO IN REGARDS TO HER CHRONIC, RECURRENT GI SYMPTOMS. SHE NEEDS TO SEE THE SPECIALIST.

## 2018-12-04 NOTE — ED PROVIDER NOTES
" EMERGENCY DEPARTMENT ENCOUNTER    Room Number:  14/14  PCP: Dania Alston PA  Historian: Patient, Family      HPI  Chief Complaint: Abdominal Pain  Context: Alicia Powell is a 23 y.o. female who presents to the ED c/o intermittent episodes of upper abdominal pain onset yesterday. Pt states that she has also had multiple episodes of N/V/D. Pt has taken Tums without significant sx relief. Pt denies chest pain, dyspnea, dysuria, and documented fever. Pt reports that she has been using marijuana daily for the past year. Pt states that she was seen for this here in the ER yesterday and was prescribed with rx for Protonix and Zofran, both of which pt has yet to fill. However, pt's abdominal pain and N/V/D have worsened since last night. Pt reports that she has had similar episodes of abdominal pain and N/V/D in the past. There are no other complaints at this time.       Pain Location: Upper abdomen  Radiation: None  Character: \"aching\"  Duration: Onset yesterday  Severity: Moderate  Progression: Worsening  Aggravating Factors: Possibly using marijuana  Alleviating Factors: Nothing        MEDICAL RECORD REVIEW    Pt has been seen in the ER several times in the past secondary to abdominal pain. Pt was last seen in the ER yesterday secondary to vomiting and was prescribed with rx for Zofran and Protonix. Pt's WBC was 16.07 yesterday.          PAST MEDICAL HISTORY  Active Ambulatory Problems     Diagnosis Date Noted   • PCOS (polycystic ovarian syndrome) 05/06/2016   • Hirsutism 05/06/2016   • Abnormal uterine bleeding (AUB) 05/06/2016   • Obesity 05/06/2016   • Nausea 07/13/2018   • Pain of upper abdomen 07/13/2018   • Non-intractable vomiting with nausea 07/13/2018   • ASCUS of cervix with negative high risk HPV 08/01/2018     Resolved Ambulatory Problems     Diagnosis Date Noted   • No Resolved Ambulatory Problems     Past Medical History:   Diagnosis Date   • Anemia    • Anxiety    • ASCUS of cervix with negative " high risk HPV 8/1/2018   • Asthma    • Depression    • Dizziness    • GERD (gastroesophageal reflux disease)    • Multiple URI    • PCOS (polycystic ovarian syndrome) 5/6/2016   • Pneumonia    • Viral infection          PAST SURGICAL HISTORY  Past Surgical History:   Procedure Laterality Date   • VAGINAL SEPTUM RESECTION  2012    Excision   • WISDOM TOOTH EXTRACTION  02/2016         FAMILY HISTORY  Family History   Problem Relation Age of Onset   • Anxiety disorder Mother    • Depression Mother    • CALDERON disease Mother    • Hypertension Maternal Grandmother    • Hyperlipidemia Maternal Grandmother    • Other Maternal Grandmother         GERD   • CALDERON disease Father    • Pancreatitis Father    • Heart disease Maternal Grandfather    • Heart disease Paternal Grandfather    • Breast cancer Neg Hx    • Ovarian cancer Neg Hx    • Uterine cancer Neg Hx    • Colon cancer Neg Hx          SOCIAL HISTORY  Social History     Socioeconomic History   • Marital status: Single     Spouse name: Not on file   • Number of children: 0   • Years of education: Not on file   • Highest education level: Not on file   Social Needs   • Financial resource strain: Not on file   • Food insecurity - worry: Not on file   • Food insecurity - inability: Not on file   • Transportation needs - medical: Not on file   • Transportation needs - non-medical: Not on file   Occupational History   • Occupation: unemployed   Tobacco Use   • Smoking status: Never Smoker   • Smokeless tobacco: Never Used   Substance and Sexual Activity   • Alcohol use: Yes     Comment: 3 to 4 drinks per month    • Drug use: Yes     Types: Marijuana     Comment: PATIENT STATES LAST USE MARIJUANA YESTERDAY 7/13/18. PATIENT STATES SHE USES MARIJUANA EVERYDAY.   • Sexual activity: Yes     Partners: Male     Birth control/protection: None   Other Topics Concern   • Not on file   Social History Narrative    Last Pelvic/PAP 2016         ALLERGIES  Patient has no known  allergies.        REVIEW OF SYSTEMS  Review of Systems   Constitutional: Negative for chills and fever.   HENT: Negative for congestion, rhinorrhea and sore throat.    Eyes: Negative for pain.   Respiratory: Negative for cough and shortness of breath.    Cardiovascular: Negative for chest pain, palpitations and leg swelling.   Gastrointestinal: Positive for abdominal pain, diarrhea, nausea and vomiting.   Genitourinary: Negative for difficulty urinating, dysuria, flank pain and frequency.   Musculoskeletal: Negative for myalgias, neck pain and neck stiffness.   Skin: Negative for rash.   Neurological: Negative for dizziness, speech difficulty, weakness, light-headedness, numbness and headaches.   Psychiatric/Behavioral: Negative.    All other systems reviewed and are negative.           PHYSICAL EXAM  ED Triage Vitals [12/04/18 0814]   Temp Heart Rate Resp BP SpO2   99 °F (37.2 °C) 109 20 156/100 100 %      Temp src Heart Rate Source Patient Position BP Location FiO2 (%)   Tympanic Monitor -- -- --         Physical Exam   Constitutional: She is oriented to person, place, and time. She appears distressed (pt appears uncomfortable).   HENT:   Head: Normocephalic.   Mouth/Throat: Mucous membranes are dry (mild).   Eyes: EOM are normal. Pupils are equal, round, and reactive to light.   Neck: Normal range of motion. Neck supple.   Cardiovascular: Normal rate, regular rhythm and normal heart sounds.   Pulmonary/Chest: Effort normal and breath sounds normal. No respiratory distress. She has no decreased breath sounds. She has no wheezes. She has no rhonchi. She has no rales.   Abdominal: Soft. There is no tenderness. There is no rebound and no guarding.   Pt is actively vomiting.   Musculoskeletal: Normal range of motion.   Neurological: She is alert and oriented to person, place, and time. She has normal sensation.   Skin: Skin is warm and dry.   Psychiatric: Mood and affect normal.   Nursing note and vitals  reviewed.          LAB RESULTS  Recent Results (from the past 24 hour(s))   Comprehensive Metabolic Panel    Collection Time: 12/03/18  8:34 PM   Result Value Ref Range    Glucose 137 (H) 65 - 99 mg/dL    BUN 12 6 - 20 mg/dL    Creatinine 0.76 0.57 - 1.00 mg/dL    Sodium 137 136 - 145 mmol/L    Potassium 3.6 3.5 - 5.2 mmol/L    Chloride 103 98 - 107 mmol/L    CO2 21.2 (L) 22.0 - 29.0 mmol/L    Calcium 9.2 8.6 - 10.5 mg/dL    Total Protein 7.1 6.0 - 8.5 g/dL    Albumin 4.30 3.50 - 5.20 g/dL    ALT (SGPT) 19 1 - 33 U/L    AST (SGOT) 20 1 - 32 U/L    Alkaline Phosphatase 60 39 - 117 U/L    Total Bilirubin 0.3 0.1 - 1.2 mg/dL    eGFR Non African Amer 95 >60 mL/min/1.73    Globulin 2.8 gm/dL    A/G Ratio 1.5 g/dL    BUN/Creatinine Ratio 15.8 7.0 - 25.0    Anion Gap 12.8 mmol/L   Lipase    Collection Time: 12/03/18  8:34 PM   Result Value Ref Range    Lipase 22 13 - 60 U/L   hCG, Serum, Qualitative    Collection Time: 12/03/18  8:34 PM   Result Value Ref Range    HCG Qualitative Negative Negative   CBC Auto Differential    Collection Time: 12/03/18  8:34 PM   Result Value Ref Range    WBC 16.07 (H) 4.50 - 10.70 10*3/mm3    RBC 5.66 (H) 3.90 - 5.20 10*6/mm3    Hemoglobin 12.6 11.9 - 15.5 g/dL    Hematocrit 40.8 35.6 - 45.5 %    MCV 72.1 (L) 80.5 - 98.2 fL    MCH 22.3 (L) 26.9 - 32.0 pg    MCHC 30.9 (L) 32.4 - 36.3 g/dL    RDW 16.0 (H) 11.7 - 13.0 %    RDW-SD 41.6 37.0 - 54.0 fl    MPV 11.3 6.0 - 12.0 fL    Platelets 431 140 - 500 10*3/mm3    Neutrophil % 83.5 (H) 42.7 - 76.0 %    Lymphocyte % 12.6 (L) 19.6 - 45.3 %    Monocyte % 3.6 (L) 5.0 - 12.0 %    Eosinophil % 0.1 (L) 0.3 - 6.2 %    Basophil % 0.2 0.0 - 1.5 %    Immature Grans % 0.2 0.0 - 0.5 %    Neutrophils, Absolute 13.43 (H) 1.90 - 8.10 10*3/mm3    Lymphocytes, Absolute 2.02 0.90 - 4.80 10*3/mm3    Monocytes, Absolute 0.58 0.20 - 1.20 10*3/mm3    Eosinophils, Absolute 0.01 0.00 - 0.70 10*3/mm3    Basophils, Absolute 0.03 0.00 - 0.20 10*3/mm3    Immature Grans,  Absolute 0.03 0.00 - 0.03 10*3/mm3   Scan Slide    Collection Time: 12/03/18  8:34 PM   Result Value Ref Range    Elliptocytes Slight/1+ None Seen    Hypochromia Mod/2+ None Seen    Microcytes Mod/2+ None Seen    WBC Morphology Normal Normal    Platelet Morphology Normal Normal   Comprehensive Metabolic Panel    Collection Time: 12/04/18  8:32 AM   Result Value Ref Range    Glucose 128 (H) 65 - 99 mg/dL    BUN 10 6 - 20 mg/dL    Creatinine 0.72 0.57 - 1.00 mg/dL    Sodium 141 136 - 145 mmol/L    Potassium 4.1 3.5 - 5.2 mmol/L    Chloride 103 98 - 107 mmol/L    CO2 23.3 22.0 - 29.0 mmol/L    Calcium 9.4 8.6 - 10.5 mg/dL    Total Protein 7.2 6.0 - 8.5 g/dL    Albumin 4.50 3.50 - 5.20 g/dL    ALT (SGPT) 16 1 - 33 U/L    AST (SGOT) 16 1 - 32 U/L    Alkaline Phosphatase 61 39 - 117 U/L    Total Bilirubin 0.3 0.1 - 1.2 mg/dL    eGFR Non African Amer 101 >60 mL/min/1.73    Globulin 2.7 gm/dL    A/G Ratio 1.7 g/dL    BUN/Creatinine Ratio 13.9 7.0 - 25.0    Anion Gap 14.7 mmol/L   Lipase    Collection Time: 12/04/18  8:32 AM   Result Value Ref Range    Lipase 38 13 - 60 U/L   CBC Auto Differential    Collection Time: 12/04/18  8:32 AM   Result Value Ref Range    WBC 15.27 (H) 4.50 - 10.70 10*3/mm3    RBC 5.21 (H) 3.90 - 5.20 10*6/mm3    Hemoglobin 11.4 (L) 11.9 - 15.5 g/dL    Hematocrit 38.1 35.6 - 45.5 %    MCV 73.1 (L) 80.5 - 98.2 fL    MCH 21.9 (L) 26.9 - 32.0 pg    MCHC 29.9 (L) 32.4 - 36.3 g/dL    RDW 15.8 (H) 11.7 - 13.0 %    RDW-SD 42.4 37.0 - 54.0 fl    MPV 11.0 6.0 - 12.0 fL    Platelets 446 140 - 500 10*3/mm3    Neutrophil % 82.9 (H) 42.7 - 76.0 %    Lymphocyte % 12.5 (L) 19.6 - 45.3 %    Monocyte % 4.5 (L) 5.0 - 12.0 %    Eosinophil % 0.0 (L) 0.3 - 6.2 %    Basophil % 0.1 0.0 - 1.5 %    Immature Grans % 0.3 0.0 - 0.5 %    Neutrophils, Absolute 12.66 (H) 1.90 - 8.10 10*3/mm3    Lymphocytes, Absolute 1.91 0.90 - 4.80 10*3/mm3    Monocytes, Absolute 0.69 0.20 - 1.20 10*3/mm3    Eosinophils, Absolute 0.00 0.00 - 0.70  10*3/mm3    Basophils, Absolute 0.01 0.00 - 0.20 10*3/mm3    Immature Grans, Absolute 0.04 (H) 0.00 - 0.03 10*3/mm3       Ordered the above labs and reviewed the results.        RADIOLOGY  CT Abdomen Pelvis With Contrast   Preliminary Result   Mild diffuse circumferential wall thickening of the colon favored to be   secondary to underdistention however, a mild colitis cannot be excluded.       These findings were discussed with Dr. Domingo Mesa by telephone.       Radiation dose reduction techniques were utilized, including automated   exposure control and exposure modulation based on body size.                   Ordered the above noted radiological studies. Reviewed by me in PACS.  Spoke with Dr. Gupta (radiologist) regarding CT Abd scan results.          PROCEDURES  Procedures        MEDICATIONS GIVEN IN ER  Medications   sodium chloride 0.9 % bolus 1,000 mL (0 mL Intravenous Stopped 12/4/18 1018)   promethazine (PHENERGAN) injection 12.5 mg (12.5 mg Intravenous Given 12/4/18 0832)   iopamidol (ISOVUE-300) 61 % injection 100 mL (85 mL Intravenous Given 12/4/18 0926)   haloperidol lactate (HALDOL) injection 2 mg (2 mg Intravenous Given 12/4/18 1022)             PROGRESS AND CONSULTS  ED Course as of Dec 04 1426   Tue Dec 04, 2018   1118 11:18 AM  Patient here with acute exacerbation of chronic abdominal pain and vomiting.  CT essentially negative.  Has had multiple workups in the past.Does use daily marijuana for over a year.  Given haldol here that has improved symptoms.  Will give phenergan for home.  Needs to stop smoking marijuana.  Needs to follow up with Dr. Morrison.  Did not fill any scripts from yesterday.  [SL]      ED Course User Index  [SL] Domingo Mesa MD       8:25 AM:  Blood work ordered for further evaluation. Phenergan ordered to treat for nausea. IV fluids ordered to hydrate pt.     9:05 AM:  CT Abd ordered for further evaluation.     10:14 AM:  Haldol ordered to treat for pt's vomiting.      11:12 AM:  Rechecked pt. After administration of the Haldol, pt reports that she feels better and pt has not been vomiting.     Pt and family were informed of pt's CT Abd results. Suspect that pt's abdominal pain and N/V/D may be associated with cannabinoid hyperemesis syndrome. Pt was strongly advised to avoid using marijuana. Pt was also advised to f/u with PMD/GI and to hydrate. Pt will be prescribed with rx for Phenergan. RTER warnings given. Pt and family agree with plan for discharge.           MEDICAL DECISION MAKING      MDM  Number of Diagnoses or Management Options     Amount and/or Complexity of Data Reviewed  Clinical lab tests: ordered and reviewed (WBC is 15.27.)  Tests in the radiology section of CPT®: ordered and reviewed (CT Abd:  Mild diffuse circumferential wall thickening of the colon favored to be  secondary to underdistention however, a mild colitis cannot be excluded.)  Discussion of test results with the performing providers: yes (CT Abd results d/w radiologist.   )  Decide to obtain previous medical records or to obtain history from someone other than the patient: yes    Patient Progress  Patient progress: stable             DIAGNOSIS  Final diagnoses:   Nausea and vomiting, intractability of vomiting not specified, unspecified vomiting type   Generalized abdominal pain         DISPOSITION  Pt discharged.    DISCHARGE    Patient discharged in stable condition.    Reviewed implications of results, diagnosis, meds, responsibility to follow up, warning signs and symptoms of possible worsening, potential complications and reasons to return to ER.    Patient/Family voiced understanding of above instructions.    Discussed plan for discharge, as there is no emergent indication for admission. Pt/family is agreeable and understands need for follow up and repeat testing. Pt is aware that discharge does not mean that nothing is wrong but it indicates no emergency is present that requires admission  and they must continue care with follow-up as given below or physician of their choice.     FOLLOW-UP  Dania Alston PA  [address]  [facility] [id number] [phone]    Schedule an appointment as soon as possible for a visit       Damon Morrison MD  [address]  [address] [id number] [phone]    Schedule an appointment as soon as possible for a visit            Medication List      New Prescriptions    promethazine 25 MG tablet  Commonly known as:  PHENERGAN  Take 1 tablet by mouth Every 6 (Six) Hours As Needed for Nausea or   Vomiting.            Latest Documented Vital Signs:  As of 11:25 AM  BP- 169/92 HR- 109 Temp- 99 °F (37.2 °C) (Tympanic) O2 sat- 100%        --  Documentation assistance provided by karrie Larsen for Dr. Bonita MD.  Information recorded by the scribe was done at my direction and has been verified and validated by me.            Catina Larsen  12/04/18 142       Domingo Mesa MD  12/04/18 [id number]

## 2018-12-05 NOTE — TELEPHONE ENCOUNTER
Patient aware, states she needed the phone number for GI so she can call them and schedule an appointment, phone number given

## 2018-12-11 ENCOUNTER — OFFICE VISIT (OUTPATIENT)
Dept: FAMILY MEDICINE CLINIC | Facility: CLINIC | Age: 23
End: 2018-12-11

## 2018-12-11 VITALS
TEMPERATURE: 98.6 F | WEIGHT: 200.3 LBS | DIASTOLIC BLOOD PRESSURE: 70 MMHG | SYSTOLIC BLOOD PRESSURE: 120 MMHG | BODY MASS INDEX: 34.19 KG/M2 | HEIGHT: 64 IN | OXYGEN SATURATION: 100 % | HEART RATE: 106 BPM

## 2018-12-11 DIAGNOSIS — R10.13 EPIGASTRIC PAIN: ICD-10-CM

## 2018-12-11 DIAGNOSIS — R11.0 NAUSEA: ICD-10-CM

## 2018-12-11 DIAGNOSIS — E53.8 B12 DEFICIENCY: ICD-10-CM

## 2018-12-11 DIAGNOSIS — D72.829 LEUKOCYTOSIS, UNSPECIFIED TYPE: ICD-10-CM

## 2018-12-11 DIAGNOSIS — E53.8 VITAMIN B 12 DEFICIENCY: ICD-10-CM

## 2018-12-11 DIAGNOSIS — R11.2 NON-INTRACTABLE VOMITING WITH NAUSEA, UNSPECIFIED VOMITING TYPE: ICD-10-CM

## 2018-12-11 DIAGNOSIS — F41.8 DEPRESSION WITH ANXIETY: Primary | ICD-10-CM

## 2018-12-11 PROCEDURE — 96372 THER/PROPH/DIAG INJ SC/IM: CPT | Performed by: PHYSICIAN ASSISTANT

## 2018-12-11 PROCEDURE — 99214 OFFICE O/P EST MOD 30 MIN: CPT | Performed by: PHYSICIAN ASSISTANT

## 2018-12-11 RX ORDER — CYANOCOBALAMIN 1000 UG/ML
1000 INJECTION, SOLUTION INTRAMUSCULAR; SUBCUTANEOUS
Status: DISCONTINUED | OUTPATIENT
Start: 2018-12-11 | End: 2020-01-10

## 2018-12-11 RX ORDER — QUETIAPINE FUMARATE 25 MG/1
25 TABLET, FILM COATED ORAL NIGHTLY
Qty: 30 TABLET | Refills: 0 | Status: SHIPPED | OUTPATIENT
Start: 2018-12-11 | End: 2019-10-20

## 2018-12-11 RX ORDER — POTASSIUM CHLORIDE 750 MG/1
10 TABLET, EXTENDED RELEASE ORAL 2 TIMES DAILY
COMMUNITY
End: 2019-10-20

## 2018-12-11 RX ORDER — POTASSIUM CHLORIDE 7.45 MG/ML
INJECTION INTRAVENOUS ONCE
COMMUNITY
End: 2019-01-02 | Stop reason: SDUPTHER

## 2018-12-11 RX ADMIN — CYANOCOBALAMIN 1000 MCG: 1000 INJECTION, SOLUTION INTRAMUSCULAR; SUBCUTANEOUS at 15:56

## 2018-12-11 NOTE — PROGRESS NOTES
Subjective   Alicia Powell is a 23 y.o. female. Patient here today for follow-up [facility] and [facility] ER for nausea and vomiting, states symptoms have resolved, discuss medications that Paxil not helping     History of Present Illness     No vomiting since d/c from hospital 12/7 or 12/8. Has had no further episodes. She has had continued anxiety with weaning Paxil but has not had SI/HI recur. Reports she does feel better on 10 mg than 20 mg but is not sleeping well and has continued anxiety.     Did not get Seroquel as discussed with ER. No Rx at pharmacy confirmed by me. No SI/HI.     The following portions of the patient's history were reviewed and updated as appropriate: allergies, current medications, past family history, past medical history, past social history, past surgical history and problem list.    Review of Systems   Psychiatric/Behavioral:        Anxiety   All other systems reviewed and are negative.      Objective   Physical Exam   Constitutional: She is oriented to person, place, and time. She appears well-developed and well-nourished.   HENT:   Head: Normocephalic and atraumatic.   Right Ear: External ear normal.   Left Ear: External ear normal.   Nose: Nose normal.   Eyes: Conjunctivae and lids are normal.   Neck: Neck supple. Carotid bruit is not present.   Cardiovascular: Normal rate, regular rhythm, normal heart sounds and intact distal pulses. Exam reveals no gallop and no friction rub.   No murmur heard.  Pulmonary/Chest: Effort normal and breath sounds normal. No respiratory distress. She has no wheezes. She has no rhonchi. She has no rales.   Musculoskeletal: She exhibits no edema or deformity.   Neurological: She is alert and oriented to person, place, and time. Gait normal.   Skin: Skin is warm and dry.   Psychiatric: She has a normal mood and affect. Her speech is normal and behavior is normal. Judgment and thought content normal. Cognition and memory are normal.   Nursing note and vitals  reviewed.      Assessment/Plan   Alicia was seen today for [facility] er and [facility] er follow-up for nausea and vomiting and discuss medications.    Diagnoses and all orders for this visit:    Vitamin B 12 deficiency  -     cyanocobalamin injection 1,000 mcg; Inject 1 mL into the appropriate muscle as directed by prescriber Every 28 (Twenty-Eight) Days.    Depression with anxiety    B12 deficiency    Leukocytosis, unspecified type    Nausea    Non-intractable vomiting with nausea, unspecified vomiting type    Epigastric pain    Other orders  -     QUEtiapine (SEROQUEL) 25 MG tablet; Take 1 tablet by mouth Every Night.      Patient Instructions   23 year old female who presents today in follow up of hospitalization and ER for abdominal pain, nausea, vomiting, and admission for the same. The hospitalist at [facility] called 12/7/18 and asked if she could start Seroquel for moods with concern of Bipolar disorder and concern that GI symptoms may be mood/psychiatric in origin. I advised it was ok to start medication and have patient follow up 12/10/18. She did not come for appt 12/10,but is seen today. However, they did not give her Seroquel at follow up. Moods continue to be better than on 20 mg but not controlled. I will start Seroquel 25 mg at bedtime. She was advised 9-1-1 to ER if develops SI/HI or to be seen asap if worsening moods. Otherwise, I will re-evaluate in 2 weeks. Continue Paxil 10 mg once daily and start Seroquel. I will also complete genetic buccal swab for Genesight testing. She also must call counselor for follow up and maintain regular visits with them. I will consider psychiatry if I am unable to control moods with medication once Genesight has been received. Patient may have personality disorder component to her mood disorder as well.     Abdominal symptoms have been attributed to marijuana use with some of her visits. I advised she stop all marijuana use. It is also difficult to control moods  with use. Patient is agreeable. I have been asking that she call GI ASAP for follow up for some time. She has not called them. She has been advised by myself, MAs when calling results, and the ER. She still has not called. Patient must call GI for follow up ASAP. Patient verbalized understanding and agreement.

## 2018-12-14 NOTE — PATIENT INSTRUCTIONS
23 year old female who presents today in follow up of hospitalization and ER for abdominal pain, nausea, vomiting, and admission for the same. The hospitalist at [facility] called 12/7/18 and asked if she could start Seroquel for moods with concern of Bipolar disorder and concern that GI symptoms may be mood/psychiatric in origin. I advised it was ok to start medication and have patient follow up 12/10/18. She did not come for appt 12/10,but is seen today. However, they did not give her Seroquel at follow up. Moods continue to be better than on 20 mg but not controlled. I will start Seroquel 25 mg at bedtime. She was advised 9-1-1 to ER if develops SI/HI or to be seen asap if worsening moods. Otherwise, I will re-evaluate in 2 weeks. Continue Paxil 10 mg once daily and start Seroquel. I will also complete genetic buccal swab for Genesight testing. She also must call counselor for follow up and maintain regular visits with them. I will consider psychiatry if I am unable to control moods with medication once Genesight has been received. Patient may have personality disorder component to her mood disorder as well.     Abdominal symptoms have been attributed to marijuana use with some of her visits. I advised she stop all marijuana use. It is also difficult to control moods with use. Patient is agreeable. I have been asking that she call GI ASAP for follow up for some time. She has not called them. She has been advised by myself, MAs when calling results, and the ER. She still has not called. Patient must call GI for follow up ASAP. Patient verbalized understanding and agreement.

## 2018-12-18 ENCOUNTER — CLINICAL SUPPORT (OUTPATIENT)
Dept: FAMILY MEDICINE CLINIC | Facility: CLINIC | Age: 23
End: 2018-12-18

## 2018-12-18 DIAGNOSIS — E53.8 VITAMIN B 12 DEFICIENCY: Primary | ICD-10-CM

## 2018-12-18 PROCEDURE — 96372 THER/PROPH/DIAG INJ SC/IM: CPT | Performed by: PHYSICIAN ASSISTANT

## 2018-12-18 RX ORDER — CYANOCOBALAMIN 1000 UG/ML
1000 INJECTION, SOLUTION INTRAMUSCULAR; SUBCUTANEOUS
Status: SHIPPED | OUTPATIENT
Start: 2018-12-18

## 2018-12-18 RX ADMIN — CYANOCOBALAMIN 1000 MCG: 1000 INJECTION, SOLUTION INTRAMUSCULAR; SUBCUTANEOUS at 14:40

## 2019-01-02 ENCOUNTER — OFFICE VISIT (OUTPATIENT)
Dept: FAMILY MEDICINE CLINIC | Facility: CLINIC | Age: 24
End: 2019-01-02

## 2019-01-02 VITALS
TEMPERATURE: 98.6 F | SYSTOLIC BLOOD PRESSURE: 122 MMHG | HEART RATE: 95 BPM | BODY MASS INDEX: 34.83 KG/M2 | WEIGHT: 204 LBS | HEIGHT: 64 IN | OXYGEN SATURATION: 99 % | DIASTOLIC BLOOD PRESSURE: 68 MMHG

## 2019-01-02 DIAGNOSIS — F41.8 DEPRESSION WITH ANXIETY: Primary | ICD-10-CM

## 2019-01-02 DIAGNOSIS — Z91.199 NONCOMPLIANCE: ICD-10-CM

## 2019-01-02 PROCEDURE — 99213 OFFICE O/P EST LOW 20 MIN: CPT | Performed by: PHYSICIAN ASSISTANT

## 2019-01-02 PROCEDURE — 96372 THER/PROPH/DIAG INJ SC/IM: CPT | Performed by: PHYSICIAN ASSISTANT

## 2019-01-02 RX ADMIN — CYANOCOBALAMIN 1000 MCG: 1000 INJECTION, SOLUTION INTRAMUSCULAR; SUBCUTANEOUS at 17:03

## 2019-01-02 NOTE — PROGRESS NOTES
Subjective   Alicia Powell is a 23 y.o. female. Patient seen for medication management, states stopped taking Paxil three weeks ago due to jittery and energetic and Seroquel two weeks ago due to intense stomach cramps    History of Present Illness     STOPPED PAXIL AND SEROQUEL DUE TO AE. DID NOT CALL WITH AE OR RETURN TO OFFICE WHEN SHE STOPPED THEM. STOPPED 3 WEEKS AGO. SHE REPORTS SHE STOPPED DUE TO STOMACH CRAMPS, HOWEVER, SHE HAS HAD CHRONIC GI ISSUES AND HAS BEEN TO ER NUMEROUS TIMES DUE TO STOMACH PAIN AND VOMITING. SHE HAS NOT CALLED GI AS SHE WAS ADVISED TO DO NUMEROUS TIMES. NO NEW SYMPTOMS. STATES SHE HAS BEEN FEELING BETTER AND MOODS HAVE BEEN A LITTLE BETTER. NO SI/HI.     The following portions of the patient's history were reviewed and updated as appropriate: allergies, current medications, past family history, past medical history, past social history, past surgical history and problem list.    Review of Systems   Psychiatric/Behavioral:        Anxiety, moods    All other systems reviewed and are negative.      Objective   Physical Exam   Constitutional: She is oriented to person, place, and time. She appears well-developed and well-nourished.   HENT:   Head: Normocephalic and atraumatic.   Right Ear: External ear normal.   Left Ear: External ear normal.   Nose: Nose normal.   Eyes: Conjunctivae and lids are normal.   Neck: Neck supple. Carotid bruit is not present.   Cardiovascular: Normal rate, regular rhythm, normal heart sounds and intact distal pulses. Exam reveals no gallop and no friction rub.   No murmur heard.  Pulmonary/Chest: Effort normal and breath sounds normal. No respiratory distress. She has no wheezes. She has no rhonchi. She has no rales.   Musculoskeletal: She exhibits no edema or deformity.   Neurological: She is alert and oriented to person, place, and time. Gait normal.   Skin: Skin is warm and dry.   Psychiatric: She has a normal mood and affect. Her speech is normal and  behavior is normal. Judgment and thought content normal. Cognition and memory are normal.   Nursing note and vitals reviewed.      Assessment/Plan   Alicia was seen today for med management.    Diagnoses and all orders for this visit:    Depression with anxiety  -     Ambulatory Referral to Psychiatry    Noncompliance  -     Ambulatory Referral to Psychiatry      Patient Instructions   23 YEAR OLD FEMALE WHO PRESENTS TODAY IN FOLLOW UP OF MOODS AND GI SYMPTOMS. SHE STOPPED PAXIL AND SEROQUEL 3 WEEKS AGO BUT NEVER CALLED OR FOLLOWED UP WITH AE OR STOPPING MEDICATION. SHE HAS HAD AE WITH MEDICATION AND HAS BEEN NONCOMPLIANT WITH FOLLOW UP AND RECOMMENDATIONS. SHE NEEDS TO SEE PSYCHIATRY WITH PSYCHOTHERAPY. I WILL REFER TODAY. I AGAIN REITERATED THAT SHE MUST CALL GI FOR FOLLOW UP. SHE HAS ALSO BEEN ADVISED TO HAVE B12 INJECTIONS AND FOLLOW UP BUT SHE HAS NOT. ONCE SHE HAS CONTROLLED HER MENTAL ILLNESS, MAYBE SHE WILL BE ABLE TO BE MORE COMPLIANT WITH HER MEDICAL CARE.

## 2019-01-09 NOTE — PATIENT INSTRUCTIONS
23 YEAR OLD FEMALE WHO PRESENTS TODAY IN FOLLOW UP OF MOODS AND GI SYMPTOMS. SHE STOPPED PAXIL AND SEROQUEL 3 WEEKS AGO BUT NEVER CALLED OR FOLLOWED UP WITH AE OR STOPPING MEDICATION. SHE HAS HAD AE WITH MEDICATION AND HAS BEEN NONCOMPLIANT WITH FOLLOW UP AND RECOMMENDATIONS. SHE NEEDS TO SEE PSYCHIATRY WITH PSYCHOTHERAPY. I WILL REFER TODAY. I AGAIN REITERATED THAT SHE MUST CALL GI FOR FOLLOW UP. SHE HAS ALSO BEEN ADVISED TO HAVE B12 INJECTIONS AND FOLLOW UP BUT SHE HAS NOT. ONCE SHE HAS CONTROLLED HER MENTAL ILLNESS, MAYBE SHE WILL BE ABLE TO BE MORE COMPLIANT WITH HER MEDICAL CARE.

## 2019-01-24 ENCOUNTER — TELEPHONE (OUTPATIENT)
Dept: FAMILY MEDICINE CLINIC | Facility: CLINIC | Age: 24
End: 2019-01-24

## 2019-01-24 NOTE — TELEPHONE ENCOUNTER
PATIENT HAS HAD RECURRENT NO SHOWS. SHE HAS ALSO BEEN NONCOMPLIANT WITH TREATMENT RECOMMENDATIONS AND NO SHOW HER INJECTIONS. PLEASE CONTINUE WITH LETTER OF D/C.

## 2019-10-16 ENCOUNTER — HOSPITAL ENCOUNTER (EMERGENCY)
Facility: HOSPITAL | Age: 24
Discharge: HOME | End: 2019-10-16
Attending: EMERGENCY MEDICINE | Admitting: EMERGENCY MEDICINE

## 2019-10-16 VITALS
BODY MASS INDEX: 36.7 KG/M2 | HEART RATE: 133 BPM | DIASTOLIC BLOOD PRESSURE: 89 MMHG | HEIGHT: 64 IN | OXYGEN SATURATION: 100 % | SYSTOLIC BLOOD PRESSURE: 171 MMHG | TEMPERATURE: 96 F | RESPIRATION RATE: 22 BRPM | WEIGHT: 215 LBS

## 2019-10-16 DIAGNOSIS — F41.9 ANXIETY: Primary | ICD-10-CM

## 2019-10-16 DIAGNOSIS — R45.4 OUTBURSTS OF ANGER: ICD-10-CM

## 2019-10-16 LAB
AMPHET+METHAMPHET UR QL: NEGATIVE
BARBITURATES UR QL SCN: NEGATIVE
BENZODIAZ UR QL SCN: NEGATIVE
CANNABINOIDS SERPL QL: POSITIVE
COCAINE UR QL: NEGATIVE
ETHANOL BLD-MCNC: <10 MG/DL (ref 0–10)
ETHANOL UR QL: <0.01 %
HCG SERPL QL: NEGATIVE
HOLD SPECIMEN: NORMAL
HOLD SPECIMEN: NORMAL
METHADONE UR QL SCN: NEGATIVE
OPIATES UR QL: NEGATIVE
OXYCODONE UR QL SCN: NEGATIVE
WHOLE BLOOD HOLD SPECIMEN: NORMAL

## 2019-10-16 PROCEDURE — 80307 DRUG TEST PRSMV CHEM ANLYZR: CPT | Performed by: NURSE PRACTITIONER

## 2019-10-16 PROCEDURE — 36415 COLL VENOUS BLD VENIPUNCTURE: CPT

## 2019-10-16 PROCEDURE — 99283 EMERGENCY DEPT VISIT LOW MDM: CPT

## 2019-10-16 PROCEDURE — 90791 PSYCH DIAGNOSTIC EVALUATION: CPT | Performed by: SOCIAL WORKER

## 2019-10-16 PROCEDURE — 84703 CHORIONIC GONADOTROPIN ASSAY: CPT | Performed by: EMERGENCY MEDICINE
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
## 2019-10-16 NOTE — ED TRIAGE NOTES
Grandma reports pt is having psychotic fits where she throws things and walks out in the rain. Pt is alert and oriented x 4. Pt reports she is having thoughts of hurting herself
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
## 2019-12-16 ENCOUNTER — OFFICE VISIT (OUTPATIENT)
Dept: OBSTETRICS AND GYNECOLOGY | Age: 24
End: 2019-12-16

## 2019-12-16 VITALS
SYSTOLIC BLOOD PRESSURE: 130 MMHG | WEIGHT: 203 LBS | BODY MASS INDEX: 34.66 KG/M2 | DIASTOLIC BLOOD PRESSURE: 80 MMHG | HEIGHT: 64 IN

## 2019-12-16 DIAGNOSIS — E28.2 PCOS (POLYCYSTIC OVARIAN SYNDROME): ICD-10-CM

## 2019-12-16 DIAGNOSIS — Z01.419 WELL WOMAN EXAM WITH ROUTINE GYNECOLOGICAL EXAM: Primary | ICD-10-CM

## 2019-12-16 PROCEDURE — 99395 PREV VISIT EST AGE 18-39: CPT | Performed by: NURSE PRACTITIONER

## 2019-12-16 NOTE — PROGRESS NOTES
[facility]  Routine Annual Visit    2019    Patient: Alicia Powell          MR#:[id number]      History of Present Illness    24 y.o. female  who presents for annual exam. Last pap ASCUS HPV negative 2018. Alicia reports normal, monthly cycles. She has a hx of PCOS so labs will be repeated today.     She desires contraception today. She did not start nuvaring prescribed last year.  She is with same partner of nearly 10 years.  She is now interested in LARC    Alicia reports moods have been stable on paxil. She is getting established with psych.     No complaints today.      Patient's last menstrual period was 2019 (approximate).  Obstetric History:  OB History        0    Para   0    Term   0       0    AB   0    Living   0       SAB   0    TAB   0    Ectopic   0    Molar        Multiple   0    Live Births                   Menstrual History:     Patient's last menstrual period was 2019 (approximate).       Sexual History:       ________________________________________  Patient Active Problem List   Diagnosis   • PCOS (polycystic ovarian syndrome)   • Hirsutism   • Abnormal uterine bleeding (AUB)   • Obesity   • Nausea   • Pain of upper abdomen   • Non-intractable vomiting with nausea   • ASCUS of cervix with negative high risk HPV       Past Medical History:   Diagnosis Date   • Anemia    • Anxiety    • ASCUS of cervix with negative high risk HPV 2018   • Asthma    • Depression    • Dizziness    • GERD (gastroesophageal reflux disease)    • Multiple URI    • PCOS (polycystic ovarian syndrome) 2016   • PCOS (polycystic ovarian syndrome)    • Pneumonia    • Viral infection        Past Surgical History:   Procedure Laterality Date   • ENDOSCOPY N/A 2018    Procedure: ESOPHAGOGASTRODUODENOSCOPY WITH COLD BIOPSIES;  Surgeon: Damon Morrison MD;  Location: [facility] ENDOSCOPY;  Service: Gastroenterology   • VAGINAL SEPTUM RESECTION      Excision   •  WISDOM TOOTH EXTRACTION  02/2016       Social History     Tobacco Use   Smoking Status Never Smoker   Smokeless Tobacco Never Used       Family History   Problem Relation Age of Onset   • Anxiety disorder Mother    • Depression Mother    • CALDERON disease Mother    • Hypertension Maternal Grandmother    • Hyperlipidemia Maternal Grandmother    • Other Maternal Grandmother         GERD   • CALDERON disease Father    • Pancreatitis Father    • Heart disease Maternal Grandfather    • Heart disease Paternal Grandfather    • Breast cancer Neg Hx    • Ovarian cancer Neg Hx    • Uterine cancer Neg Hx    • Colon cancer Neg Hx        Prior to Admission medications    Medication Sig Start Date End Date Taking? Authorizing Provider   albuterol sulfate  (90 Base) MCG/ACT inhaler Inhale 2 puffs Every 6 (Six) Hours As Needed for Wheezing or Shortness of Air. 10/20/19  Yes Morgan Kim DO   Probiotic Product (PROBIOTIC-10 PO) Take  by mouth.   Yes ProviderHaider MD   calcium carbonate (TUMS) 500 MG chewable tablet Chew 1 tablet Daily.  12/16/19  ProviderHaider MD   diclofenac (VOLTAREN) 75 MG EC tablet Take 1 tablet by mouth 2 (Two) Times a Day. As needed for cramping 10/20/19 12/16/19  Morgan Kim DO   fluconazole (DIFLUCAN) 150 MG tablet Po x 1 now and repeat in 3 days 10/20/19 12/16/19  Morgan Kim DO       The following portions of the patient's history were reviewed and updated as appropriate: allergies, current medications, past family history, past medical history, past social history, past surgical history and problem list.    Review of Systems   Constitutional: Negative.    HENT: Negative.    Eyes: Negative for visual disturbance.   Respiratory: Negative for cough, shortness of breath and wheezing.    Cardiovascular: Negative for chest pain, palpitations and leg swelling.   Gastrointestinal: Negative for abdominal distention, abdominal pain, blood in stool, constipation, diarrhea, nausea  "and vomiting.   Endocrine: Negative for cold intolerance and heat intolerance.   Genitourinary: Negative for difficulty urinating, dyspareunia, dysuria, frequency, genital sores, hematuria, menstrual problem, pelvic pain, urgency, vaginal bleeding, vaginal discharge and vaginal pain.   Musculoskeletal: Negative.    Skin: Negative.    Neurological: Negative for dizziness, weakness, light-headedness, numbness and headaches.   Hematological: Negative.    Psychiatric/Behavioral: Negative.    Breasts: negative for lumps skin changes, dimpling, swelling, nipple changes/discharge bilaterally         Objective   Physical Exam    /80   Ht 162.6 cm (64\")   Wt 92.1 kg (203 lb)   LMP 11/13/2019 (Approximate)   BMI 34.84 kg/m²    BP Readings from Last 3 Encounters:   12/16/19 130/80   10/20/19 146/80   10/16/19 171/89      Wt Readings from Last 3 Encounters:   12/16/19 92.1 kg (203 lb)   10/20/19 97.5 kg (215 lb)   10/16/19 97.5 kg (215 lb)        BMI: Estimated body mass index is 34.84 kg/m² as calculated from the following:    Height as of this encounter: 162.6 cm (64\").    Weight as of this encounter: 92.1 kg (203 lb).        General:   alert, appears stated age and cooperative   Heart: regular rate and rhythm, S1, S2 normal, no murmur, click, rub or gallop   Lungs: clear to auscultation bilaterally   Abdomen: soft, non-tender, without masses or organomegaly   Breast: inspection negative, no nipple discharge or bleeding, no masses or nodularity palpable   Vulva: normal   Vagina: normal mucosa, normal discharge   Cervix: no cervical motion tenderness and no lesions   Uterus: normal size, mobile, non-tender or normal shape and consistency   Adnexa: no mass, fullness, tenderness     Assessment:    1. Normal annual exam   2. Contraception- all options discussed in detail. Alicia loaizas kyleena. Risks, benefits, possible side effects, and insertion technique discussed in detail. Advised no unprotected IC until insertion " as can be no chance of pregnancy. Recommended premedicate with 600 mg ibuprofen. We will get her precerted for this and she will return on her menses for insertion.   3. Counseled on healthy lifestyle modifications, safe sex, condom use, and self breast exams.        Plan:    []  Rx:   []  Mammogram request made  []  PAP done  []  Occult fecal blood test (Insure)  []  Labs:   []  GC/Chl/TV  []  DEXA scan   []  Referral for colonoscopy:           MARIBELL Houser  12/16/2019 10:44 AM

## 2019-12-18 LAB
ALBUMIN SERPL-MCNC: 4.2 G/DL (ref 3.5–5.2)
ALBUMIN/GLOB SERPL: 1.6 G/DL
ALP SERPL-CCNC: 70 U/L (ref 39–117)
ALT SERPL-CCNC: 10 U/L (ref 1–33)
AST SERPL-CCNC: 15 U/L (ref 1–32)
BILIRUB SERPL-MCNC: 0.3 MG/DL (ref 0.2–1.2)
BUN SERPL-MCNC: 7 MG/DL (ref 6–20)
BUN/CREAT SERPL: 10 (ref 7–25)
C TRACH RRNA SPEC QL NAA+PROBE: NEGATIVE
CALCIUM SERPL-MCNC: 8.9 MG/DL (ref 8.6–10.5)
CHLORIDE SERPL-SCNC: 103 MMOL/L (ref 98–107)
CO2 SERPL-SCNC: 23.6 MMOL/L (ref 22–29)
CONV .: NORMAL
CREAT SERPL-MCNC: 0.7 MG/DL (ref 0.57–1)
CYTOLOGIST CVX/VAG CYTO: NORMAL
CYTOLOGY CVX/VAG DOC CYTO: NORMAL
CYTOLOGY CVX/VAG DOC THIN PREP: NORMAL
DHEA-S SERPL-MCNC: 203.3 UG/DL (ref 110–431.7)
DX ICD CODE: NORMAL
GLOBULIN SER CALC-MCNC: 2.7 GM/DL
GLUCOSE SERPL-MCNC: 80 MG/DL (ref 65–99)
HBA1C MFR BLD: 4.7 % (ref 4.8–5.6)
HIV 1 & 2 AB SER-IMP: NORMAL
N GONORRHOEA RRNA SPEC QL NAA+PROBE: NEGATIVE
OTHER STN SPEC: NORMAL
POTASSIUM SERPL-SCNC: 4.4 MMOL/L (ref 3.5–5.2)
PROLACTIN SERPL-MCNC: 14 NG/ML (ref 4.8–23.3)
PROT SERPL-MCNC: 6.9 G/DL (ref 6–8.5)
SODIUM SERPL-SCNC: 138 MMOL/L (ref 136–145)
STAT OF ADQ CVX/VAG CYTO-IMP: NORMAL
T VAGINALIS DNA SPEC QL NAA+PROBE: NEGATIVE
TESTOST FREE SERPL-MCNC: 2.6 PG/ML (ref 0–4.2)
TESTOST SERPL-MCNC: 33 NG/DL (ref 8–48)
TSH SERPL DL<=0.005 MIU/L-ACNC: 1.84 UIU/ML (ref 0.27–4.2)

## 2019-12-19 ENCOUNTER — TELEPHONE (OUTPATIENT)
Dept: OBSTETRICS AND GYNECOLOGY | Age: 24
End: 2019-12-19

## 2019-12-19 PROBLEM — R87.610 ASCUS OF CERVIX WITH NEGATIVE HIGH RISK HPV: Status: RESOLVED | Noted: 2018-08-01 | Resolved: 2019-12-19

## 2019-12-19 NOTE — TELEPHONE ENCOUNTER
----- Message from MARIBELL Monzon sent at 12/19/2019 10:01 AM EST -----  Please inform patient her pap smear returned negative (normal) and all labs return WNL. Thank you.

## 2020-01-10 ENCOUNTER — OFFICE VISIT (OUTPATIENT)
Dept: FAMILY MEDICINE CLINIC | Facility: CLINIC | Age: 25
End: 2020-01-10

## 2020-01-10 VITALS
BODY MASS INDEX: 36.37 KG/M2 | DIASTOLIC BLOOD PRESSURE: 72 MMHG | WEIGHT: 213 LBS | SYSTOLIC BLOOD PRESSURE: 126 MMHG | HEART RATE: 119 BPM | OXYGEN SATURATION: 99 % | HEIGHT: 64 IN

## 2020-01-10 DIAGNOSIS — F41.9 ANXIETY: ICD-10-CM

## 2020-01-10 DIAGNOSIS — F31.81 BIPOLAR 2 DISORDER (HCC): Primary | ICD-10-CM

## 2020-01-10 PROCEDURE — 99213 OFFICE O/P EST LOW 20 MIN: CPT | Performed by: NURSE PRACTITIONER

## 2020-01-10 RX ORDER — QUETIAPINE FUMARATE 50 MG/1
TABLET, FILM COATED ORAL
Qty: 90 TABLET | Refills: 2 | Status: SHIPPED | OUTPATIENT
Start: 2020-01-10 | End: 2020-01-19

## 2020-01-10 RX ORDER — VENLAFAXINE HYDROCHLORIDE 75 MG/1
75 CAPSULE, EXTENDED RELEASE ORAL DAILY
Qty: 30 CAPSULE | Refills: 2 | Status: SHIPPED | OUTPATIENT
Start: 2020-01-10 | End: 2020-03-17

## 2020-01-10 RX ORDER — FAMOTIDINE 20 MG/1
20 TABLET, FILM COATED ORAL 2 TIMES DAILY
COMMUNITY
End: 2020-05-29

## 2020-01-10 NOTE — PROGRESS NOTES
Subjective   Alicia Powell is a 24 y.o. female.     Chief Complaint   Patient presents with   • Depression     Would like a psychiatric referral for depression and possible bipolar disorder        History of Present Illness     Patient is here today to establish care as a new patient.  She was previous with [facility].  She moved from [place].      She would like a general check up.  She would also like to get into a psychiatrist.     Has had therapy in the past.       Suicidal ideations 2 months ago.  Attempt in past, but non recently    Paxil (alone)-made her mood worse-kept her up  Seroquel-she cannot remember how she felt      The following portions of the patient's history were reviewed and updated as appropriate: allergies, current medications, past family history, past medical history, past social history, past surgical history and problem list.    Past Medical History:   Diagnosis Date   • Abnormal uterine bleeding (AUB) 5/6/2016   • Anemia    • Anxiety    • ASCUS of cervix with negative high risk HPV 8/1/2018   • Asthma    • Depression    • Dizziness    • GERD (gastroesophageal reflux disease)    • Multiple URI    • PCOS (polycystic ovarian syndrome) 5/6/2016   • PCOS (polycystic ovarian syndrome)    • Pneumonia    • Viral infection        Past Surgical History:   Procedure Laterality Date   • ENDOSCOPY N/A 7/14/2018    Procedure: ESOPHAGOGASTRODUODENOSCOPY WITH COLD BIOPSIES;  Surgeon: Damon Morrison MD;  Location: [facility] ENDOSCOPY;  Service: Gastroenterology   • VAGINAL SEPTUM RESECTION  2012    Excision   • WISDOM TOOTH EXTRACTION  02/2016       Family History   Problem Relation Age of Onset   • Anxiety disorder Mother    • Depression Mother    • CALDERON disease Mother    • Hypertension Maternal Grandmother    • Hyperlipidemia Maternal Grandmother    • Other Maternal Grandmother         GERD   • CALDERON disease Father    • Pancreatitis Father    • Heart disease Maternal Grandfather    • Heart  disease Paternal Grandfather    • Breast cancer Neg Hx    • Ovarian cancer Neg Hx    • Uterine cancer Neg Hx    • Colon cancer Neg Hx        Social History     Socioeconomic History   • Marital status: Single     Spouse name: Not on file   • Number of children: 0   • Years of education: Not on file   • Highest education level: Not on file   Occupational History   • Occupation: unemployed   Tobacco Use   • Smoking status: Current Some Day Smoker     Types: Cigars   • Smokeless tobacco: Never Used   Substance and Sexual Activity   • Alcohol use: Yes     Frequency: 2-4 times a month     Drinks per session: 3 or 4     Comment: 3 to 4 drinks per month    • Drug use: Yes     Types: Marijuana     Comment: uses twice weekly    • Sexual activity: Yes     Partners: Male     Birth control/protection: Condom   Social History Narrative    Last Pelvic/PAP 2016         Current Outpatient Medications:   •  albuterol sulfate  (90 Base) MCG/ACT inhaler, Inhale 2 puffs Every 6 (Six) Hours As Needed for Wheezing or Shortness of Air., Disp: 6.7 g, Rfl: 0  •  famotidine (PEPCID) 20 MG tablet, Take 20 mg by mouth 2 (Two) Times a Day., Disp: , Rfl:   •  Probiotic Product (PROBIOTIC-10 PO), Take  by mouth., Disp: , Rfl:   •  QUEtiapine (SEROQUEL) 50 MG tablet, Take 1 tablet by mouth Every Night for 3 days, THEN 2 tablets Every Night for 3 days, THEN 3 tablets Every Night for 3 days., Disp: 90 tablet, Rfl: 2  •  venlafaxine XR (EFFEXOR XR) 75 MG 24 hr capsule, Take 1 capsule by mouth Daily., Disp: 30 capsule, Rfl: 2    Current Facility-Administered Medications:   •  cyanocobalamin injection 1,000 mcg, 1,000 mcg, Intramuscular, Q28 Days, Dania Alston PA, 1,000 mcg at 12/18/18 1440    Review of Systems   Respiratory: Positive for chest tightness. Negative for cough, shortness of breath and wheezing.    Cardiovascular: Negative for chest pain.   Gastrointestinal: Negative for abdominal pain, constipation, diarrhea, nausea and  "vomiting.   Genitourinary: Positive for frequency. Negative for dysuria and urgency.   Skin: Negative.    Neurological: Negative for dizziness and headache.   Psychiatric/Behavioral: Positive for sleep disturbance and depressed mood. Negative for suicidal ideas. The patient is nervous/anxious.         PHQ 9 score of 24  MOOD questionaire is positive  CRESENCIO 7 score is 14       Objective   Vitals:    01/10/20 1551   BP: 126/72   Pulse: 119   SpO2: 99%   Weight: 96.6 kg (213 lb)   Height: 162.6 cm (64\")     Body mass index is 36.56 kg/m².  Physical Exam   Constitutional: She is oriented to person, place, and time. She appears well-developed and well-nourished.   Cardiovascular: Normal rate, regular rhythm, normal heart sounds and intact distal pulses.   Neurological: She is alert and oriented to person, place, and time.   Psychiatric: Her speech is normal and behavior is normal. Judgment and thought content normal. Her mood appears anxious. Cognition and memory are normal.         Assessment/Plan   Alicia was seen today for depression.    Diagnoses and all orders for this visit:    Bipolar 2 disorder ([facility])  -     Ambulatory Referral to Psychiatry    Anxiety  -     Ambulatory Referral to Psychiatry    Other orders  -     QUEtiapine (SEROQUEL) 50 MG tablet; Take 1 tablet by mouth Every Night for 3 days, THEN 2 tablets Every Night for 3 days, THEN 3 tablets Every Night for 3 days.  -     venlafaxine XR (EFFEXOR XR) 75 MG 24 hr capsule; Take 1 capsule by mouth Daily.    Will trial Seroquel and Effexor.  If worsening mood stop medication notify provider.  If any thoughts of suicide or homicide go to the ER.  We will make referral for psychiatry and plan short-term follow-up in 2 weeks based on patient's symptoms.             Patient Instructions   Bipolar 2 Disorder  Bipolar 2 disorder is a mental health disorder in which a person has episodes of emotional highs (gómez) and lows (depression). Bipolar 2 is different from " other bipolar disorders because the manic episodes are not as high and do not last as long. This is called hypomania. People with bipolar 2 disorder usually go back and forth between hypomanic and depressive episodes.  What are the causes?  The cause of this condition is not known.  What increases the risk?  The following factors may make you more likely to develop this condition:  · Having a family member with the disorder.  · An imbalance of certain chemicals in the brain (neurotransmitters).  · Stress, such as a death, illness, or financial problems.  · Certain conditions that affect the brain or spinal cord (neurologic conditions).  · Brain injury (trauma).  · Having another mental health disorder, such as:  ? Obsessive compulsive disorder.  ? Schizophrenia.  What are the signs or symptoms?  Symptoms of hypomania include:  · Very high self-esteem or self-confidence.  · Decreased need for sleep.  · Unusual talkativeness or feeling a need to keep talking. Speech may be very fast. It may seem like you cannot stop talking.  · Racing thoughts or constant talking, with quick shifts between topics that may or may not be related (flight of ideas).  · Decreased ability to focus or concentrate.  · Increased purposeful activity, such as work, studies, or social activity.  · Increased nonproductive activity. This could be pacing, squirming and fidgeting, or finger and toe tapping.  · Impulsive behavior and poor judgment. This may result in high-risk activities, such as having unprotected sex or spending a lot of money.  Symptoms of depression include:  · Feeling sad, hopeless, or helpless.  · Frequent or uncontrollable crying.  · Lack of feeling or caring about anything.  · Sleeping too much.  · Moving more slowly than usual.  · Not being able to enjoy things you used to enjoy.  · Desire to be alone all the time.  · Feeling guilty or worthless.  · Lack of energy or motivation.  · Trouble concentrating or  remembering.  · Trouble making decisions.  · Increased appetite.  · Thoughts of death or desire to harm yourself.  How is this diagnosed?  To diagnose bipolar 2 disorder, your health care provider may ask about your:  · Emotional episodes.  · Medical history.  · Alcohol and drug use. This includes prescription medicines. Certain medical conditions and substances can cause symptoms that seem like bipolar disorder (secondary bipolar disorder).  How is this treated?  Bipolar 2 disorder is a long-term (chronic) illness. It is best controlled with ongoing (continuous) treatment rather than being treated only when symptoms occur. Treatment may include:  · Psychotherapy. Some forms of talk therapy, such as cognitive-behavioral therapy (CBT), can provide support, education, and guidance.  · Coping strategies, such as journaling or relaxation exercises. Relaxation exercises include:  ? Yoga.  ? Meditation.  ? Deep breathing.  · Lifestyle changes, such as:  ? Limiting alcohol and drug use.  ? Exercising regularly.  ? Getting plenty of sleep.  ? Making healthy eating choices.  · Medicine. Medicine can be prescribed by a health care provider who specializes in treating mental disorders (psychiatrist).  ? Medicines called mood stabilizers are usually prescribed.  ? If symptoms occur even while taking a mood stabilizer, other medicines may be added.  A combination of medicine, talk therapy, and coping methods is the best way to treat this condition.  Follow these instructions at home:  Activity  · Return to your normal activities as told by your health care provider.  · Find activities that you enjoy, and make time to do them.  · Exercise regularly as told by your health care provider.  Lifestyle  · Limit alcohol intake to no more than 1 drink a day for nonpregnant women and 2 drinks a day for men. One drink equals 12 oz of beer, 5 oz of wine, or 1½ oz of hard liquor.  · Follow a set schedule for eating and sleeping.  · Eat a  balanced diet that includes fresh fruits and vegetables, whole grains, low-fat dairy, and lean meats.  · Get at least 7-8 hours of sleep each night.  General instructions  · Take over-the-counter and prescription medicines only as told by your health care provider.  · Think about joining a support group. Your health care provider may be able to recommend a support group.  · Talk with your family and loved ones about your treatment goals and how they can help.  · Keep all follow-up visits as told by your health care provider. This is important.  Where to find more information  For more information about bipolar 2 disorder, visit the following websites:  · National [place] on Mental Illness: www.alessio.org  · [place] National [place] of Mental Health: www.nimh.nih.gov  Contact a health care provider if:  · Your symptoms get worse.  · You have side effects from your medicine, and they get worse.  · You have trouble sleeping.  · You have trouble doing daily activities.  · You feel unsafe in your surroundings.  · You are dealing with substance abuse.  Get help right away if:  · You have new symptoms.  · You have thoughts about harming yourself or others.  · You harm yourself.  Summary  · Bipolar 2 disorder is a mental health disorder in which a person has episodes of hypomania and depression.  · Bipolar 2 is best treated through a combination of medicines, talk therapy, and coping strategies.  · Talk with your family and loved ones about your treatment goals and how they can help.  This information is not intended to replace advice given to you by your health care provider. Make sure you discuss any questions you have with your health care provider.  Document Released: 01/23/2018 Document Revised: 01/23/2018 Document Reviewed: 01/23/2018  Elsevier Interactive Patient Education © 2019 [employer]

## 2020-01-10 NOTE — PATIENT INSTRUCTIONS
Bipolar 2 Disorder  Bipolar 2 disorder is a mental health disorder in which a person has episodes of emotional highs (gómez) and lows (depression). Bipolar 2 is different from other bipolar disorders because the manic episodes are not as high and do not last as long. This is called hypomania. People with bipolar 2 disorder usually go back and forth between hypomanic and depressive episodes.  What are the causes?  The cause of this condition is not known.  What increases the risk?  The following factors may make you more likely to develop this condition:  · Having a family member with the disorder.  · An imbalance of certain chemicals in the brain (neurotransmitters).  · Stress, such as a death, illness, or financial problems.  · Certain conditions that affect the brain or spinal cord (neurologic conditions).  · Brain injury (trauma).  · Having another mental health disorder, such as:  ? Obsessive compulsive disorder.  ? Schizophrenia.  What are the signs or symptoms?  Symptoms of hypomania include:  · Very high self-esteem or self-confidence.  · Decreased need for sleep.  · Unusual talkativeness or feeling a need to keep talking. Speech may be very fast. It may seem like you cannot stop talking.  · Racing thoughts or constant talking, with quick shifts between topics that may or may not be related (flight of ideas).  · Decreased ability to focus or concentrate.  · Increased purposeful activity, such as work, studies, or social activity.  · Increased nonproductive activity. This could be pacing, squirming and fidgeting, or finger and toe tapping.  · Impulsive behavior and poor judgment. This may result in high-risk activities, such as having unprotected sex or spending a lot of money.  Symptoms of depression include:  · Feeling sad, hopeless, or helpless.  · Frequent or uncontrollable crying.  · Lack of feeling or caring about anything.  · Sleeping too much.  · Moving more slowly than usual.  · Not being able to  enjoy things you used to enjoy.  · Desire to be alone all the time.  · Feeling guilty or worthless.  · Lack of energy or motivation.  · Trouble concentrating or remembering.  · Trouble making decisions.  · Increased appetite.  · Thoughts of death or desire to harm yourself.  How is this diagnosed?  To diagnose bipolar 2 disorder, your health care provider may ask about your:  · Emotional episodes.  · Medical history.  · Alcohol and drug use. This includes prescription medicines. Certain medical conditions and substances can cause symptoms that seem like bipolar disorder (secondary bipolar disorder).  How is this treated?  Bipolar 2 disorder is a long-term (chronic) illness. It is best controlled with ongoing (continuous) treatment rather than being treated only when symptoms occur. Treatment may include:  · Psychotherapy. Some forms of talk therapy, such as cognitive-behavioral therapy (CBT), can provide support, education, and guidance.  · Coping strategies, such as journaling or relaxation exercises. Relaxation exercises include:  ? Yoga.  ? Meditation.  ? Deep breathing.  · Lifestyle changes, such as:  ? Limiting alcohol and drug use.  ? Exercising regularly.  ? Getting plenty of sleep.  ? Making healthy eating choices.  · Medicine. Medicine can be prescribed by a health care provider who specializes in treating mental disorders (psychiatrist).  ? Medicines called mood stabilizers are usually prescribed.  ? If symptoms occur even while taking a mood stabilizer, other medicines may be added.  A combination of medicine, talk therapy, and coping methods is the best way to treat this condition.  Follow these instructions at home:  Activity  · Return to your normal activities as told by your health care provider.  · Find activities that you enjoy, and make time to do them.  · Exercise regularly as told by your health care provider.  Lifestyle  · Limit alcohol intake to no more than 1 drink a day for nonpregnant women  and 2 drinks a day for men. One drink equals 12 oz of beer, 5 oz of wine, or 1½ oz of hard liquor.  · Follow a set schedule for eating and sleeping.  · Eat a balanced diet that includes fresh fruits and vegetables, whole grains, low-fat dairy, and lean meats.  · Get at least 7-8 hours of sleep each night.  General instructions  · Take over-the-counter and prescription medicines only as told by your health care provider.  · Think about joining a support group. Your health care provider may be able to recommend a support group.  · Talk with your family and loved ones about your treatment goals and how they can help.  · Keep all follow-up visits as told by your health care provider. This is important.  Where to find more information  For more information about bipolar 2 disorder, visit the following websites:  · National [place] on Mental Illness: www.alessio.org  · U.[place] National [place] of Mental Health: www.nimh.nih.gov  Contact a health care provider if:  · Your symptoms get worse.  · You have side effects from your medicine, and they get worse.  · You have trouble sleeping.  · You have trouble doing daily activities.  · You feel unsafe in your surroundings.  · You are dealing with substance abuse.  Get help right away if:  · You have new symptoms.  · You have thoughts about harming yourself or others.  · You harm yourself.  Summary  · Bipolar 2 disorder is a mental health disorder in which a person has episodes of hypomania and depression.  · Bipolar 2 is best treated through a combination of medicines, talk therapy, and coping strategies.  · Talk with your family and loved ones about your treatment goals and how they can help.  This information is not intended to replace advice given to you by your health care provider. Make sure you discuss any questions you have with your health care provider.  Document Released: 01/23/2018 Document Revised: 01/23/2018 Document Reviewed: 01/23/2018  [employer] Patient  Education © 2019 [employer]

## 2020-02-12 ENCOUNTER — OFFICE VISIT (OUTPATIENT)
Dept: FAMILY MEDICINE CLINIC | Facility: CLINIC | Age: 25
End: 2020-02-12

## 2020-02-12 VITALS
DIASTOLIC BLOOD PRESSURE: 68 MMHG | OXYGEN SATURATION: 96 % | SYSTOLIC BLOOD PRESSURE: 100 MMHG | HEART RATE: 124 BPM | WEIGHT: 219.4 LBS | HEIGHT: 64 IN | TEMPERATURE: 98.6 F | BODY MASS INDEX: 37.46 KG/M2

## 2020-02-12 DIAGNOSIS — J06.9 UPPER RESPIRATORY TRACT INFECTION, UNSPECIFIED TYPE: Primary | ICD-10-CM

## 2020-02-12 DIAGNOSIS — J45.901 MODERATE ASTHMA WITH ACUTE EXACERBATION, UNSPECIFIED WHETHER PERSISTENT: ICD-10-CM

## 2020-02-12 PROCEDURE — 99214 OFFICE O/P EST MOD 30 MIN: CPT | Performed by: NURSE PRACTITIONER

## 2020-02-12 RX ORDER — QUETIAPINE FUMARATE 50 MG/1
150 TABLET, FILM COATED ORAL NIGHTLY
COMMUNITY
End: 2020-03-17

## 2020-02-12 RX ORDER — PREDNISONE 20 MG/1
TABLET ORAL
Qty: 9 TABLET | Refills: 0 | Status: SHIPPED | OUTPATIENT
Start: 2020-02-12 | End: 2020-02-18

## 2020-02-12 RX ORDER — DEXTROMETHORPHAN HYDROBROMIDE AND PROMETHAZINE HYDROCHLORIDE 15; 6.25 MG/5ML; MG/5ML
5 SYRUP ORAL 4 TIMES DAILY PRN
Qty: 240 ML | Refills: 0 | Status: SHIPPED | OUTPATIENT
Start: 2020-02-12 | End: 2020-05-29

## 2020-02-12 RX ORDER — AMOXICILLIN AND CLAVULANATE POTASSIUM 875; 125 MG/1; MG/1
1 TABLET, FILM COATED ORAL 2 TIMES DAILY
Qty: 14 TABLET | Refills: 0 | Status: SHIPPED | OUTPATIENT
Start: 2020-02-12 | End: 2020-05-29

## 2020-02-12 NOTE — PATIENT INSTRUCTIONS
"Asthma Attack    Acute bronchospasm caused by asthma is also referred to as an asthma attack. Bronchospasm means that the air passages become narrowed or \"tight,\" which limits the amount of oxygen that can get into the lungs. The narrowing is caused by inflammation and tightening of the muscles in the air tubes (bronchi) in the lungs. Excessive mucus is also produced, which narrows the airways more. This can cause trouble breathing, coughing, and loud breathing (wheezing).  What are the causes?  Possible triggers include:  · Animal dander from the skin, hair, or feathers of animals.  · Dust mites contained in house dust.  · Cockroaches.  · Pollen from trees or grass.  · Mold.  · Cigarette or tobacco smoke.  · Air pollutants such as dust, household , hair sprays, aerosol sprays, paint fumes, strong chemicals, or strong odors.  · Cold air or weather changes. Cold air may trigger inflammation. Winds increase molds and pollens in the air.  · Strong emotions such as crying or laughing hard.  · Stress.  · Certain medicines, such as aspirin or beta-blockers.  · Sulfites in foods and drinks, such as dried fruits and wine.  · Infections or inflammatory conditions, such as a flu, a cold, pneumonia, or inflammation of the nasal membranes (rhinitis).  · Gastroesophageal reflux disease (GERD). GERD is a condition in which stomach acid backs up into your esophagus, which can irritate nearby airway structures.  · Exercise or activity that requires a lot of energy.  What are the signs or symptoms?  Symptoms of this condition include:  · Wheezing. This may sound like whistling while breathing. This may be more noticeable at night.  · Excessive coughing, particularly at night.  · Chest tightness or pain.  · Shortness of breath.  · Feeling like you cannot get enough air no matter how hard you try (air hunger).  How is this diagnosed?  This condition may be diagnosed based on:  · Your medical history.  · Your symptoms.  · A " physical exam.  · Tests to check for other causes of your symptoms or other conditions that may have triggered your asthma attack. These tests may include:  ? Chest X-ray.  ? Blood tests.  ? Specialized tests to assess lung function, such as breathing into a device that measures how much air you inhale and exhale (spirometry).  How is this treated?  The goal of treatment is to open the airways in your lungs and reduce inflammation. Most asthma attacks are treated with medicines that you inhale through a hand-held inhaler (metered dose inhaler, MDI) or a device that turns liquid medicine into a mist that you inhale (nebulizer). Medicines may include:  · Quick relief or rescue medicines that relax the muscles of the bronchi. These medicines include bronchodilators, such as albuterol.  · Controller medicines, such as inhaled corticosteroids. These are long-acting medicines that are used for daily asthma maintenance.  If you have a moderate or severe asthma attack, you may be treated with steroid medicines by mouth or through an IV injection at the hospital. Steroid medicines reduce inflammation in your lungs. Depending on the severity of your attack, you may need oxygen therapy to help you breathe.  If your asthma attack was caused by a bacterial infection, such as pneumonia, you will be given antibiotic medicines.  Follow these instructions at home:  Medicines  · Take over-the-counter and prescription medicines only as told by your health care provider. Keep your medicines up-to-date and available.  · If you are more than 24 weeks pregnant and you are prescribed any new medicines, tell your obstetrician about those medicines.  · If you were prescribed an antibiotic medicine, take it as told by your health care provider. Do not stop taking the antibiotic even if you start to feel better.  Avoiding triggers    · Keep track of things that trigger your asthma attacks or cause you to have breathing problems, and avoid  "exposure to these triggers.  · Do not use any products that contain nicotine or tobacco, such as cigarettes and e-cigarettes. If you need help quitting, ask your health care provider.  · Avoid secondhand smoke.  · Avoid strong smells, such as perfumes, aerosols, and cleaning solvents.  · When pollen or air pollution is bad, keep windows closed and use an air conditioner or go to places with air conditioning.  Asthma action plan  · Work with your health care provider to make a written plan for managing and treating your asthma attacks (asthma action plan). This plan should include:  ? A list of your asthma triggers and how to avoid them.  ? Information about when your medicines should be taken and when their dosage should be changed.  ? Instructions about using a device called a peak flow meter to monitor your condition. A peak flow meter measures how well your lungs are working and measures how severe your asthma is at a given time. Your \"personal best\" is the highest peak flow rate you can reach when you feel good and have no asthma symptoms.  General instructions  · Avoid excessive exercise or activity until your asthma attack resolves. Ask your health care provider what activities are safe for you and when you can return to your normal activities.  · Stay up to date on all vaccinations recommended by your health care provider, such as flu and pneumonia vaccines.  · Drink enough fluid to keep your urine clear or pale yellow. Staying hydrated helps keep mucus in your lungs thin so it can be coughed up easily.  · If you drink caffeine, do so in moderation.  · Do not use alcohol until you have recovered.  · Keep all follow-up visits as told by your health care provider. This is important. Asthma requires careful medical care, and you and your health care provider can work together to reduce the likelihood of future attacks.  Contact a health care provider if:  · Your peak flow reading is still at 50-79% of your " "personal best after you have followed your action plan for 1 hour. This is in the yellow zone, which means \"caution.\"  · You need to use a reliever medicine more than 2-3 times a week.  · Your medicines are causing side effects, such as:  ? Rash.  ? Itching.  ? Swelling.  ? Trouble breathing.  · Your symptoms do not improve after 48 hours.  · You cough up mucus (sputum) that is thicker than usual.  · You have a fever.  · You need to use your medicines much more frequently than normal.  Get help right away if:  · Your peak flow reading is less than 50% of your personal best. This is in the red zone, which means \"danger.\"  · You have severe trouble breathing.  · You develop chest pain or discomfort.  · Your medicines no longer seem to be helping.  · You vomit.  · You cannot eat or drink without vomiting.  · You are coughing up yellow, green, brown, or bloody mucus.  · You have a fever and your symptoms suddenly get worse.  · You have trouble swallowing.  · You feel very tired, and breathing becomes tiring.  Summary  · Acute bronchospasm caused by asthma is also referred to as an asthma attack.  · Bronchospasm is caused by narrowing or tightness in air passages, which causes shortness of breath, coughing, and loud breathing (wheezing).  · Many things can trigger an asthma attack, such as allergens, weather changes, exercise, smoke, and other fumes.  · Treatment for an asthma attack may include inhaled rescue medicines for immediate relief, as well as the use of maintenance therapy.  · Get help right away if you have worsening shortness of breath, chest pain, or fever, or if your home medicines are no longer helping with your symptoms.  This information is not intended to replace advice given to you by your health care provider. Make sure you discuss any questions you have with your health care provider.  Document Released: 04/03/2008 Document Revised: 01/19/2018 Document Reviewed: 01/19/2018  [employer] Interactive " "Patient Education © 2019 [employer]      Upper Respiratory Infection, Adult  An upper respiratory infection (URI) affects the nose, throat, and upper air passages. URIs are caused by germs (viruses). The most common type of URI is often called \"the common cold.\"  Medicines cannot cure URIs, but you can do things at home to relieve your symptoms. URIs usually get better within 7-10 days.  Follow these instructions at home:  Activity  · Rest as needed.  · If you have a fever, stay home from work or school until your fever is gone, or until your doctor says you may return to work or school.  ? You should stay home until you cannot spread the infection anymore (you are not contagious).  ? Your doctor may have you wear a face mask so you have less risk of spreading the infection.  Relieving symptoms  · Gargle with a salt-water mixture 3-4 times a day or as needed. To make a salt-water mixture, completely dissolve ½-1 tsp of salt in 1 cup of warm water.  · Use a cool-mist humidifier to add moisture to the air. This can help you breathe more easily.  Eating and drinking    · Drink enough fluid to keep your pee (urine) pale yellow.  · Eat soups and other clear broths.  General instructions    · Take over-the-counter and prescription medicines only as told by your doctor. These include cold medicines, fever reducers, and cough suppressants.  · Do not use any products that contain nicotine or tobacco. These include cigarettes and e-cigarettes. If you need help quitting, ask your doctor.  · Avoid being where people are smoking (avoid secondhand smoke).  · Make sure you get regular shots and get the flu shot every year.  · Keep all follow-up visits as told by your doctor. This is important.  How to avoid spreading infection to others    · Wash your hands often with soap and water. If you do not have soap and water, use hand .  · Avoid touching your mouth, face, eyes, or nose.  · Cough or sneeze into a tissue or your " "sleeve or elbow. Do not cough or sneeze into your hand or into the air.  Contact a doctor if:  · You are getting worse, not better.  · You have any of these:  ? A fever.  ? Chills.  ? Brown or red mucus in your nose.  ? Yellow or brown fluid (discharge)coming from your nose.  ? Pain in your face, especially when you bend forward.  ? Swollen neck glands.  ? Pain with swallowing.  ? White areas in the back of your throat.  Get help right away if:  · You have shortness of breath that gets worse.  · You have very bad or constant:  ? Headache.  ? Ear pain.  ? Pain in your forehead, behind your eyes, and over your cheekbones (sinus pain).  ? Chest pain.  · You have long-lasting (chronic) lung disease along with any of these:  ? Wheezing.  ? Long-lasting cough.  ? Coughing up blood.  ? A change in your usual mucus.  · You have a stiff neck.  · You have changes in your:  ? Vision.  ? Hearing.  ? Thinking.  ? Mood.  Summary  · An upper respiratory infection (URI) is caused by a germ called a virus. The most common type of URI is often called \"the common cold.\"  · URIs usually get better within 7-10 days.  · Take over-the-counter and prescription medicines only as told by your doctor.  This information is not intended to replace advice given to you by your health care provider. Make sure you discuss any questions you have with your health care provider.  Document Released: 06/05/2009 Document Revised: 08/10/2018 Document Reviewed: 08/10/2018  [employer] Interactive Patient Education © 2019 [employer]      "

## 2020-02-12 NOTE — PROGRESS NOTES
Subjective   Alicia Powell is a 24 y.o. female.     Chief Complaint   Patient presents with   • Cough   • Back Pain   • URI   • Nasal Congestion     runny nose        History of Present Illness     Patient is here today with c/o nasal congestion and discharge, cough, and back pain from cough for last 3 days.     Reports fever on first day and took ibuprofen.       OTC: tylenol dayquil-1 dose.  Helped some.         The following portions of the patient's history were reviewed and updated as appropriate: allergies, current medications, past family history, past medical history, past social history, past surgical history and problem list.    Past Medical History:   Diagnosis Date   • Abnormal uterine bleeding (AUB) 5/6/2016   • Anemia    • Anxiety    • ASCUS of cervix with negative high risk HPV 8/1/2018   • Asthma    • Depression    • Dizziness    • GERD (gastroesophageal reflux disease)    • Multiple URI    • PCOS (polycystic ovarian syndrome) 5/6/2016   • PCOS (polycystic ovarian syndrome)    • Pneumonia    • Viral infection        Past Surgical History:   Procedure Laterality Date   • ENDOSCOPY N/A 7/14/2018    Procedure: ESOPHAGOGASTRODUODENOSCOPY WITH COLD BIOPSIES;  Surgeon: Damon Morrison MD;  Location: [facility] ENDOSCOPY;  Service: Gastroenterology   • VAGINAL SEPTUM RESECTION  2012    Excision   • WISDOM TOOTH EXTRACTION  02/2016       Family History   Problem Relation Age of Onset   • Anxiety disorder Mother    • Depression Mother    • CALDERON disease Mother    • Hypertension Maternal Grandmother    • Hyperlipidemia Maternal Grandmother    • Other Maternal Grandmother         GERD   • CALDERON disease Father    • Pancreatitis Father    • Heart disease Maternal Grandfather    • Heart disease Paternal Grandfather    • Breast cancer Neg Hx    • Ovarian cancer Neg Hx    • Uterine cancer Neg Hx    • Colon cancer Neg Hx        Social History     Socioeconomic History   • Marital status: Single     Spouse name: Not on file    • Number of children: 0   • Years of education: Not on file   • Highest education level: Not on file   Occupational History   • Occupation: unemployed   Tobacco Use   • Smoking status: Current Some Day Smoker     Types: Cigars   • Smokeless tobacco: Never Used   Substance and Sexual Activity   • Alcohol use: Yes     Frequency: 2-4 times a month     Drinks per session: 3 or 4     Comment: 3 to 4 drinks per month    • Drug use: Yes     Types: Marijuana     Comment: uses twice weekly    • Sexual activity: Yes     Partners: Male     Birth control/protection: Condom   Social History Narrative    Last Pelvic/PAP 2016         Current Outpatient Medications:   •  albuterol sulfate  (90 Base) MCG/ACT inhaler, Inhale 2 puffs Every 6 (Six) Hours As Needed for Wheezing or Shortness of Air., Disp: 6.7 g, Rfl: 0  •  famotidine (PEPCID) 20 MG tablet, Take 20 mg by mouth 2 (Two) Times a Day., Disp: , Rfl:   •  Probiotic Product (PROBIOTIC-10 PO), Take  by mouth., Disp: , Rfl:   •  QUEtiapine (SEROquel) 50 MG tablet, Take 150 mg by mouth Every Night., Disp: , Rfl:   •  venlafaxine XR (EFFEXOR XR) 75 MG 24 hr capsule, Take 1 capsule by mouth Daily., Disp: 30 capsule, Rfl: 2  •  amoxicillin-clavulanate (AUGMENTIN) 875-125 MG per tablet, Take 1 tablet by mouth 2 (Two) Times a Day., Disp: 14 tablet, Rfl: 0  •  predniSONE (DELTASONE) 20 MG tablet, Take 1 tablet by mouth 2 (Two) Times a Day for 3 days, THEN 1 tablet Daily for 3 days., Disp: 9 tablet, Rfl: 0  •  promethazine-dextromethorphan (PROMETHAZINE-DM) 6.25-15 MG/5ML syrup, Take 5 mL by mouth 4 (Four) Times a Day As Needed for Cough., Disp: 240 mL, Rfl: 0    Current Facility-Administered Medications:   •  cyanocobalamin injection 1,000 mcg, 1,000 mcg, Intramuscular, Q28 Days, Dania Alston PA, 1,000 mcg at 12/18/18 1440    Review of Systems   Constitutional: Positive for fatigue. Negative for fever.   HENT: Positive for congestion, rhinorrhea and sore throat. Negative  "for ear discharge, ear pain and sinus pressure.    Respiratory: Positive for cough (with yellowish sputum production) and wheezing (using inhaler). Negative for shortness of breath.    Cardiovascular: Negative for chest pain.   Gastrointestinal: Negative for abdominal pain, constipation, diarrhea, nausea and vomiting.   Genitourinary: Negative for dysuria and urgency.   Musculoskeletal: Positive for back pain.   Skin: Negative.    Neurological: Positive for headache. Negative for dizziness.       Objective   Vitals:    02/12/20 1328   BP: 100/68   Pulse: (!) 124   Temp: 98.6 °F (37 °C)   SpO2: 96%   Weight: 99.5 kg (219 lb 6.4 oz)   Height: 162.6 cm (64\")     Body mass index is 37.66 kg/m².  Physical Exam   Constitutional: She is oriented to person, place, and time. She appears well-developed and well-nourished.   HENT:   Head: Normocephalic and atraumatic.   Right Ear: Tympanic membrane mobility is abnormal.   Left Ear: Tympanic membrane mobility is abnormal.   Nose: Rhinorrhea and congestion present. Right sinus exhibits no maxillary sinus tenderness and no frontal sinus tenderness. Left sinus exhibits no maxillary sinus tenderness and no frontal sinus tenderness.   Mouth/Throat: Uvula is midline, oropharynx is clear and moist and mucous membranes are normal. Tonsils are 0 on the right. Tonsils are 0 on the left.   Cardiovascular: Normal rate, regular rhythm and normal heart sounds.   Pulmonary/Chest: Effort normal. She has decreased breath sounds. She has wheezes in the right upper field and the left upper field.   Neurological: She is alert and oriented to person, place, and time.   Psychiatric: She has a normal mood and affect. Her behavior is normal. Judgment and thought content normal.         Assessment/Plan   Alicia was seen today for cough, back pain, uri and nasal congestion.    Diagnoses and all orders for this visit:    Upper respiratory tract infection, unspecified type    Moderate asthma with acute " "exacerbation, unspecified whether persistent    Other orders  -     amoxicillin-clavulanate (AUGMENTIN) 875-125 MG per tablet; Take 1 tablet by mouth 2 (Two) Times a Day.  -     predniSONE (DELTASONE) 20 MG tablet; Take 1 tablet by mouth 2 (Two) Times a Day for 3 days, THEN 1 tablet Daily for 3 days.  -     promethazine-dextromethorphan (PROMETHAZINE-DM) 6.25-15 MG/5ML syrup; Take 5 mL by mouth 4 (Four) Times a Day As Needed for Cough.      Will start on antibiotic and steroid based on asthma history and physical exam of hearing wheezes in lungs, as well as fever report a few days ago.  If worsening symptoms, return to clinic for further evaluation.  Use cough medication prn.    May continue to use otc medication as needed.  If albuterol use is not decreasing, follow up and we may need to start long acting inhaler use.             Patient Instructions   Asthma Attack    Acute bronchospasm caused by asthma is also referred to as an asthma attack. Bronchospasm means that the air passages become narrowed or \"tight,\" which limits the amount of oxygen that can get into the lungs. The narrowing is caused by inflammation and tightening of the muscles in the air tubes (bronchi) in the lungs. Excessive mucus is also produced, which narrows the airways more. This can cause trouble breathing, coughing, and loud breathing (wheezing).  What are the causes?  Possible triggers include:  · Animal dander from the skin, hair, or feathers of animals.  · Dust mites contained in house dust.  · Cockroaches.  · Pollen from trees or grass.  · Mold.  · Cigarette or tobacco smoke.  · Air pollutants such as dust, household , hair sprays, aerosol sprays, paint fumes, strong chemicals, or strong odors.  · Cold air or weather changes. Cold air may trigger inflammation. Winds increase molds and pollens in the air.  · Strong emotions such as crying or laughing hard.  · Stress.  · Certain medicines, such as aspirin or " beta-blockers.  · Sulfites in foods and drinks, such as dried fruits and wine.  · Infections or inflammatory conditions, such as a flu, a cold, pneumonia, or inflammation of the nasal membranes (rhinitis).  · Gastroesophageal reflux disease (GERD). GERD is a condition in which stomach acid backs up into your esophagus, which can irritate nearby airway structures.  · Exercise or activity that requires a lot of energy.  What are the signs or symptoms?  Symptoms of this condition include:  · Wheezing. This may sound like whistling while breathing. This may be more noticeable at night.  · Excessive coughing, particularly at night.  · Chest tightness or pain.  · Shortness of breath.  · Feeling like you cannot get enough air no matter how hard you try (air hunger).  How is this diagnosed?  This condition may be diagnosed based on:  · Your medical history.  · Your symptoms.  · A physical exam.  · Tests to check for other causes of your symptoms or other conditions that may have triggered your asthma attack. These tests may include:  ? Chest X-ray.  ? Blood tests.  ? Specialized tests to assess lung function, such as breathing into a device that measures how much air you inhale and exhale (spirometry).  How is this treated?  The goal of treatment is to open the airways in your lungs and reduce inflammation. Most asthma attacks are treated with medicines that you inhale through a hand-held inhaler (metered dose inhaler, MDI) or a device that turns liquid medicine into a mist that you inhale (nebulizer). Medicines may include:  · Quick relief or rescue medicines that relax the muscles of the bronchi. These medicines include bronchodilators, such as albuterol.  · Controller medicines, such as inhaled corticosteroids. These are long-acting medicines that are used for daily asthma maintenance.  If you have a moderate or severe asthma attack, you may be treated with steroid medicines by mouth or through an IV injection at the  "hospital. Steroid medicines reduce inflammation in your lungs. Depending on the severity of your attack, you may need oxygen therapy to help you breathe.  If your asthma attack was caused by a bacterial infection, such as pneumonia, you will be given antibiotic medicines.  Follow these instructions at home:  Medicines  · Take over-the-counter and prescription medicines only as told by your health care provider. Keep your medicines up-to-date and available.  · If you are more than 24 weeks pregnant and you are prescribed any new medicines, tell your obstetrician about those medicines.  · If you were prescribed an antibiotic medicine, take it as told by your health care provider. Do not stop taking the antibiotic even if you start to feel better.  Avoiding triggers    · Keep track of things that trigger your asthma attacks or cause you to have breathing problems, and avoid exposure to these triggers.  · Do not use any products that contain nicotine or tobacco, such as cigarettes and e-cigarettes. If you need help quitting, ask your health care provider.  · Avoid secondhand smoke.  · Avoid strong smells, such as perfumes, aerosols, and cleaning solvents.  · When pollen or air pollution is bad, keep windows closed and use an air conditioner or go to places with air conditioning.  Asthma action plan  · Work with your health care provider to make a written plan for managing and treating your asthma attacks (asthma action plan). This plan should include:  ? A list of your asthma triggers and how to avoid them.  ? Information about when your medicines should be taken and when their dosage should be changed.  ? Instructions about using a device called a peak flow meter to monitor your condition. A peak flow meter measures how well your lungs are working and measures how severe your asthma is at a given time. Your \"personal best\" is the highest peak flow rate you can reach when you feel good and have no asthma " "symptoms.  General instructions  · Avoid excessive exercise or activity until your asthma attack resolves. Ask your health care provider what activities are safe for you and when you can return to your normal activities.  · Stay up to date on all vaccinations recommended by your health care provider, such as flu and pneumonia vaccines.  · Drink enough fluid to keep your urine clear or pale yellow. Staying hydrated helps keep mucus in your lungs thin so it can be coughed up easily.  · If you drink caffeine, do so in moderation.  · Do not use alcohol until you have recovered.  · Keep all follow-up visits as told by your health care provider. This is important. Asthma requires careful medical care, and you and your health care provider can work together to reduce the likelihood of future attacks.  Contact a health care provider if:  · Your peak flow reading is still at 50-79% of your personal best after you have followed your action plan for 1 hour. This is in the yellow zone, which means \"caution.\"  · You need to use a reliever medicine more than 2-3 times a week.  · Your medicines are causing side effects, such as:  ? Rash.  ? Itching.  ? Swelling.  ? Trouble breathing.  · Your symptoms do not improve after 48 hours.  · You cough up mucus (sputum) that is thicker than usual.  · You have a fever.  · You need to use your medicines much more frequently than normal.  Get help right away if:  · Your peak flow reading is less than 50% of your personal best. This is in the red zone, which means \"danger.\"  · You have severe trouble breathing.  · You develop chest pain or discomfort.  · Your medicines no longer seem to be helping.  · You vomit.  · You cannot eat or drink without vomiting.  · You are coughing up yellow, green, brown, or bloody mucus.  · You have a fever and your symptoms suddenly get worse.  · You have trouble swallowing.  · You feel very tired, and breathing becomes tiring.  Summary  · Acute bronchospasm " "caused by asthma is also referred to as an asthma attack.  · Bronchospasm is caused by narrowing or tightness in air passages, which causes shortness of breath, coughing, and loud breathing (wheezing).  · Many things can trigger an asthma attack, such as allergens, weather changes, exercise, smoke, and other fumes.  · Treatment for an asthma attack may include inhaled rescue medicines for immediate relief, as well as the use of maintenance therapy.  · Get help right away if you have worsening shortness of breath, chest pain, or fever, or if your home medicines are no longer helping with your symptoms.  This information is not intended to replace advice given to you by your health care provider. Make sure you discuss any questions you have with your health care provider.  Document Released: 04/03/2008 Document Revised: 01/19/2018 Document Reviewed: 01/19/2018  [employer] Interactive Patient Education © 2019 [employer]      Upper Respiratory Infection, Adult  An upper respiratory infection (URI) affects the nose, throat, and upper air passages. URIs are caused by germs (viruses). The most common type of URI is often called \"the common cold.\"  Medicines cannot cure URIs, but you can do things at home to relieve your symptoms. URIs usually get better within 7-10 days.  Follow these instructions at home:  Activity  · Rest as needed.  · If you have a fever, stay home from work or school until your fever is gone, or until your doctor says you may return to work or school.  ? You should stay home until you cannot spread the infection anymore (you are not contagious).  ? Your doctor may have you wear a face mask so you have less risk of spreading the infection.  Relieving symptoms  · Gargle with a salt-water mixture 3-4 times a day or as needed. To make a salt-water mixture, completely dissolve ½-1 tsp of salt in 1 cup of warm water.  · Use a cool-mist humidifier to add moisture to the air. This can help you breathe more " "easily.  Eating and drinking    · Drink enough fluid to keep your pee (urine) pale yellow.  · Eat soups and other clear broths.  General instructions    · Take over-the-counter and prescription medicines only as told by your doctor. These include cold medicines, fever reducers, and cough suppressants.  · Do not use any products that contain nicotine or tobacco. These include cigarettes and e-cigarettes. If you need help quitting, ask your doctor.  · Avoid being where people are smoking (avoid secondhand smoke).  · Make sure you get regular shots and get the flu shot every year.  · Keep all follow-up visits as told by your doctor. This is important.  How to avoid spreading infection to others    · Wash your hands often with soap and water. If you do not have soap and water, use hand .  · Avoid touching your mouth, face, eyes, or nose.  · Cough or sneeze into a tissue or your sleeve or elbow. Do not cough or sneeze into your hand or into the air.  Contact a doctor if:  · You are getting worse, not better.  · You have any of these:  ? A fever.  ? Chills.  ? Brown or red mucus in your nose.  ? Yellow or brown fluid (discharge)coming from your nose.  ? Pain in your face, especially when you bend forward.  ? Swollen neck glands.  ? Pain with swallowing.  ? White areas in the back of your throat.  Get help right away if:  · You have shortness of breath that gets worse.  · You have very bad or constant:  ? Headache.  ? Ear pain.  ? Pain in your forehead, behind your eyes, and over your cheekbones (sinus pain).  ? Chest pain.  · You have long-lasting (chronic) lung disease along with any of these:  ? Wheezing.  ? Long-lasting cough.  ? Coughing up blood.  ? A change in your usual mucus.  · You have a stiff neck.  · You have changes in your:  ? Vision.  ? Hearing.  ? Thinking.  ? Mood.  Summary  · An upper respiratory infection (URI) is caused by a germ called a virus. The most common type of URI is often called \"the " "common cold.\"  · URIs usually get better within 7-10 days.  · Take over-the-counter and prescription medicines only as told by your doctor.  This information is not intended to replace advice given to you by your health care provider. Make sure you discuss any questions you have with your health care provider.  Document Released: 06/05/2009 Document Revised: 08/10/2018 Document Reviewed: 08/10/2018  [employer] Interactive Patient Education © 2019 [employer]              "

## 2020-03-18 RX ORDER — QUETIAPINE FUMARATE 50 MG/1
TABLET, FILM COATED ORAL
Qty: 90 TABLET | Refills: 0 | Status: SHIPPED | OUTPATIENT
Start: 2020-03-18 | End: 2020-05-12

## 2020-03-18 RX ORDER — VENLAFAXINE HYDROCHLORIDE 75 MG/1
CAPSULE, EXTENDED RELEASE ORAL
Qty: 30 CAPSULE | Refills: 2 | Status: SHIPPED | OUTPATIENT
Start: 2020-03-18 | End: 2020-05-12 | Stop reason: SDUPTHER

## 2020-05-12 NOTE — TELEPHONE ENCOUNTER
PATIENT WAS CALLING TO GET A REFILL ON THE venlafaxine XR (EFFEXOR-XR) 75 MG 24 hr capsule , QUEtiapine (SEROquel) 50 MG tablet     PATIENT C/B # [phone]   PATIENT ADVISED HAS BEEN TOTALLY OUT FOR 3 DAYS     PATIENT CONFIRMED [facility] [address] [place] [id number] [facility] [place] [phone] [facility] [phone] FX

## 2020-05-13 RX ORDER — QUETIAPINE FUMARATE 50 MG/1
150 TABLET, FILM COATED ORAL NIGHTLY
Qty: 90 TABLET | Refills: 0 | Status: SHIPPED | OUTPATIENT
Start: 2020-05-13 | End: 2020-06-16 | Stop reason: SDUPTHER

## 2020-05-13 RX ORDER — VENLAFAXINE HYDROCHLORIDE 75 MG/1
75 CAPSULE, EXTENDED RELEASE ORAL DAILY
Qty: 30 CAPSULE | Refills: 0 | Status: SHIPPED | OUTPATIENT
Start: 2020-05-13 | End: 2020-06-16 | Stop reason: SDUPTHER

## 2020-05-13 NOTE — TELEPHONE ENCOUNTER
Please advise patient she must have follow up for additional refills.  She was suppose to have a 2 week follow up in January.   She can do a telehealth video visit.

## 2020-05-29 ENCOUNTER — APPOINTMENT (OUTPATIENT)
Dept: CT IMAGING | Facility: HOSPITAL | Age: 25
End: 2020-05-29

## 2020-05-29 ENCOUNTER — HOSPITAL ENCOUNTER (EMERGENCY)
Facility: HOSPITAL | Age: 25
Discharge: HOME | End: 2020-05-29
Attending: EMERGENCY MEDICINE | Admitting: EMERGENCY MEDICINE

## 2020-05-29 VITALS
HEART RATE: 108 BPM | HEIGHT: 64 IN | TEMPERATURE: 98.4 F | BODY MASS INDEX: 36.7 KG/M2 | WEIGHT: 215 LBS | SYSTOLIC BLOOD PRESSURE: 122 MMHG | RESPIRATION RATE: 18 BRPM | OXYGEN SATURATION: 96 % | DIASTOLIC BLOOD PRESSURE: 78 MMHG

## 2020-05-29 DIAGNOSIS — R10.13 EPIGASTRIC PAIN: Primary | ICD-10-CM

## 2020-05-29 LAB
ALBUMIN SERPL-MCNC: 4.7 G/DL (ref 3.5–5.2)
ALBUMIN/GLOB SERPL: 1.7 G/DL
ALP SERPL-CCNC: 62 U/L (ref 39–117)
ALT SERPL W P-5'-P-CCNC: 12 U/L (ref 1–33)
ANION GAP SERPL CALCULATED.3IONS-SCNC: 13 MMOL/L (ref 5–15)
ANISOCYTOSIS BLD QL: ABNORMAL
AST SERPL-CCNC: 14 U/L (ref 1–32)
BILIRUB SERPL-MCNC: 0.2 MG/DL (ref 0.2–1.2)
BILIRUB UR QL STRIP: NEGATIVE
BUN BLD-MCNC: 6 MG/DL (ref 6–20)
BUN/CREAT SERPL: 9 (ref 7–25)
CALCIUM SPEC-SCNC: 9.1 MG/DL (ref 8.6–10.5)
CHLORIDE SERPL-SCNC: 105 MMOL/L (ref 98–107)
CLARITY UR: CLEAR
CO2 SERPL-SCNC: 21 MMOL/L (ref 22–29)
COLOR UR: YELLOW
CREAT BLD-MCNC: 0.67 MG/DL (ref 0.57–1)
DEPRECATED RDW RBC AUTO: 45.3 FL (ref 37–54)
ERYTHROCYTE [DISTWIDTH] IN BLOOD BY AUTOMATED COUNT: 17.2 % (ref 12.3–15.4)
ETHANOL BLD-MCNC: <10 MG/DL (ref 0–10)
ETHANOL UR QL: <0.01 %
GFR SERPL CREATININE-BSD FRML MDRD: 109 ML/MIN/1.73
GLOBULIN UR ELPH-MCNC: 2.8 GM/DL
GLUCOSE BLD-MCNC: 119 MG/DL (ref 65–99)
GLUCOSE UR STRIP-MCNC: NEGATIVE MG/DL
HCG SERPL QL: NEGATIVE
HCT VFR BLD AUTO: 38.3 % (ref 34–46.6)
HGB BLD-MCNC: 11.7 G/DL (ref 12–15.9)
HGB UR QL STRIP.AUTO: NEGATIVE
KETONES UR QL STRIP: NEGATIVE
LEUKOCYTE ESTERASE UR QL STRIP.AUTO: NEGATIVE
LIPASE SERPL-CCNC: 21 U/L (ref 13–60)
LYMPHOCYTES # BLD MANUAL: 0.67 10*3/MM3 (ref 0.7–3.1)
LYMPHOCYTES NFR BLD MANUAL: 2 % (ref 5–12)
LYMPHOCYTES NFR BLD MANUAL: 4 % (ref 19.6–45.3)
MCH RBC QN AUTO: 22.9 PG (ref 26.6–33)
MCHC RBC AUTO-ENTMCNC: 30.5 G/DL (ref 31.5–35.7)
MCV RBC AUTO: 74.8 FL (ref 79–97)
MICROCYTES BLD QL: ABNORMAL
MONOCYTES # BLD AUTO: 0.34 10*3/MM3 (ref 0.1–0.9)
NEUTROPHILS # BLD AUTO: 15.8 10*3/MM3 (ref 1.7–7)
NEUTROPHILS NFR BLD MANUAL: 94 % (ref 42.7–76)
NITRITE UR QL STRIP: NEGATIVE
PH UR STRIP.AUTO: >=9 [PH] (ref 5–8)
PLAT MORPH BLD: NORMAL
PLATELET # BLD AUTO: 517 10*3/MM3 (ref 140–450)
PMV BLD AUTO: 10.8 FL (ref 6–12)
POTASSIUM BLD-SCNC: 3.7 MMOL/L (ref 3.5–5.2)
PROT SERPL-MCNC: 7.5 G/DL (ref 6–8.5)
PROT UR QL STRIP: NEGATIVE
RBC # BLD AUTO: 5.12 10*6/MM3 (ref 3.77–5.28)
SODIUM BLD-SCNC: 139 MMOL/L (ref 136–145)
SP GR UR STRIP: 1.02 (ref 1–1.03)
UROBILINOGEN UR QL STRIP: ABNORMAL
WBC MORPH BLD: NORMAL
WBC NRBC COR # BLD: 16.81 10*3/MM3 (ref 3.4–10.8)

## 2020-05-29 PROCEDURE — 96374 THER/PROPH/DIAG INJ IV PUSH: CPT

## 2020-05-29 PROCEDURE — 96375 TX/PRO/DX INJ NEW DRUG ADDON: CPT

## 2020-05-29 PROCEDURE — 84703 CHORIONIC GONADOTROPIN ASSAY: CPT | Performed by: PHYSICIAN ASSISTANT

## 2020-05-29 PROCEDURE — 25010000002 ONDANSETRON PER 1 MG: Performed by: PHYSICIAN ASSISTANT

## 2020-05-29 PROCEDURE — 25010000002 HYDROMORPHONE PER 4 MG: Performed by: EMERGENCY MEDICINE

## 2020-05-29 PROCEDURE — 96376 TX/PRO/DX INJ SAME DRUG ADON: CPT

## 2020-05-29 PROCEDURE — 25010000002 ONDANSETRON PER 1 MG: Performed by: EMERGENCY MEDICINE

## 2020-05-29 PROCEDURE — 81003 URINALYSIS AUTO W/O SCOPE: CPT | Performed by: PHYSICIAN ASSISTANT

## 2020-05-29 PROCEDURE — 83690 ASSAY OF LIPASE: CPT | Performed by: PHYSICIAN ASSISTANT

## 2020-05-29 PROCEDURE — 74177 CT ABD & PELVIS W/CONTRAST: CPT

## 2020-05-29 PROCEDURE — 99284 EMERGENCY DEPT VISIT MOD MDM: CPT

## 2020-05-29 PROCEDURE — 80307 DRUG TEST PRSMV CHEM ANLYZR: CPT | Performed by: PHYSICIAN ASSISTANT

## 2020-05-29 PROCEDURE — 85007 BL SMEAR W/DIFF WBC COUNT: CPT | Performed by: PHYSICIAN ASSISTANT

## 2020-05-29 PROCEDURE — 80053 COMPREHEN METABOLIC PANEL: CPT | Performed by: PHYSICIAN ASSISTANT

## 2020-05-29 PROCEDURE — 85025 COMPLETE CBC W/AUTO DIFF WBC: CPT | Performed by: PHYSICIAN ASSISTANT

## 2020-05-29 PROCEDURE — 25010000002 IOPAMIDOL 61 % SOLUTION: Performed by: EMERGENCY MEDICINE

## 2020-05-29 RX ORDER — LIDOCAINE HYDROCHLORIDE 20 MG/ML
15 SOLUTION OROPHARYNGEAL ONCE
Status: COMPLETED | OUTPATIENT
Start: 2020-05-29 | End: 2020-05-29

## 2020-05-29 RX ORDER — ONDANSETRON 4 MG/1
4 TABLET, ORALLY DISINTEGRATING ORAL EVERY 8 HOURS PRN
Qty: 15 TABLET | Refills: 0 | Status: SHIPPED | OUTPATIENT
Start: 2020-05-29 | End: 2020-09-22

## 2020-05-29 RX ORDER — HYDROMORPHONE HYDROCHLORIDE 1 MG/ML
0.5 INJECTION, SOLUTION INTRAMUSCULAR; INTRAVENOUS; SUBCUTANEOUS ONCE
Status: COMPLETED | OUTPATIENT
Start: 2020-05-29 | End: 2020-05-29

## 2020-05-29 RX ORDER — ONDANSETRON 2 MG/ML
4 INJECTION INTRAMUSCULAR; INTRAVENOUS ONCE
Status: COMPLETED | OUTPATIENT
Start: 2020-05-29 | End: 2020-05-29

## 2020-05-29 RX ORDER — PANTOPRAZOLE SODIUM 40 MG/1
40 TABLET, DELAYED RELEASE ORAL DAILY
Qty: 21 TABLET | Refills: 0 | Status: SHIPPED | OUTPATIENT
Start: 2020-05-29 | End: 2020-06-16

## 2020-05-29 RX ORDER — ALUMINA, MAGNESIA, AND SIMETHICONE 2400; 2400; 240 MG/30ML; MG/30ML; MG/30ML
15 SUSPENSION ORAL ONCE
Status: COMPLETED | OUTPATIENT
Start: 2020-05-29 | End: 2020-05-29

## 2020-05-29 RX ORDER — SUCRALFATE 1 G/1
1 TABLET ORAL 4 TIMES DAILY
Qty: 28 TABLET | Refills: 0 | Status: SHIPPED | OUTPATIENT
Start: 2020-05-29 | End: 2020-07-20 | Stop reason: HOSPADM

## 2020-05-29 RX ORDER — SODIUM CHLORIDE 0.9 % (FLUSH) 0.9 %
10 SYRINGE (ML) INJECTION AS NEEDED
Status: DISCONTINUED | OUTPATIENT
Start: 2020-05-29 | End: 2020-05-29 | Stop reason: HOSPADM

## 2020-05-29 RX ADMIN — ALUMINUM HYDROXIDE, MAGNESIUM HYDROXIDE, AND DIMETHICONE 15 ML: 400; 400; 40 SUSPENSION ORAL at 03:46

## 2020-05-29 RX ADMIN — HYDROMORPHONE HYDROCHLORIDE 0.5 MG: 1 INJECTION, SOLUTION INTRAMUSCULAR; INTRAVENOUS; SUBCUTANEOUS at 05:13

## 2020-05-29 RX ADMIN — SODIUM CHLORIDE 1000 ML: 9 INJECTION, SOLUTION INTRAVENOUS at 03:01

## 2020-05-29 RX ADMIN — LIDOCAINE HYDROCHLORIDE 15 ML: 20 SOLUTION ORAL; TOPICAL at 03:46

## 2020-05-29 RX ADMIN — IOPAMIDOL 85 ML: 612 INJECTION, SOLUTION INTRAVENOUS at 05:03

## 2020-05-29 RX ADMIN — ONDANSETRON 4 MG: 2 INJECTION INTRAMUSCULAR; INTRAVENOUS at 03:06

## 2020-05-29 RX ADMIN — ONDANSETRON 4 MG: 2 INJECTION INTRAMUSCULAR; INTRAVENOUS at 05:12

## 2020-06-16 ENCOUNTER — OFFICE VISIT (OUTPATIENT)
Dept: FAMILY MEDICINE CLINIC | Facility: CLINIC | Age: 25
End: 2020-06-16

## 2020-06-16 DIAGNOSIS — F41.9 ANXIETY: ICD-10-CM

## 2020-06-16 DIAGNOSIS — F31.81 BIPOLAR 2 DISORDER (HCC): Primary | ICD-10-CM

## 2020-06-16 PROCEDURE — 99442 PR PHYS/QHP TELEPHONE EVALUATION 11-20 MIN: CPT | Performed by: NURSE PRACTITIONER

## 2020-06-16 RX ORDER — VENLAFAXINE HYDROCHLORIDE 75 MG/1
75 CAPSULE, EXTENDED RELEASE ORAL DAILY
Qty: 30 CAPSULE | Refills: 5 | Status: SHIPPED | OUTPATIENT
Start: 2020-06-16 | End: 2020-10-22 | Stop reason: SDUPTHER

## 2020-06-16 RX ORDER — QUETIAPINE FUMARATE 50 MG/1
150 TABLET, FILM COATED ORAL NIGHTLY
Qty: 90 TABLET | Refills: 5 | Status: SHIPPED | OUTPATIENT
Start: 2020-06-16 | End: 2020-10-22

## 2020-06-16 NOTE — PROGRESS NOTES
You have chosen to receive care through a telephone visit. Do you consent to use a telephone visit for your medical care today? YES    Subjective   Alicia Powell is a 24 y.o. female.     Chief Complaint   Patient presents with   • Manic Behavior     FOLLOW UP ON BIPOLAR DISORDER        History of Present Illness     Patient presents today for telehealth on telephone for follow up on Bipolar disorder.  I saw her back in January as a new patient and started her on medication for bipolar.  She has been taking both medications, bipolar and effexor and states both are working pretty well.  She feels like mood is controlled for most part, but feels like that is to do with not taking medication on time.     She doesn't feel like she needs increase of dose of medication.    Denies any SI or HI      The following portions of the patient's history were reviewed and updated as appropriate: allergies, current medications, past family history, past medical history, past social history, past surgical history and problem list.    Past Medical History:   Diagnosis Date   • Abnormal uterine bleeding (AUB) 5/6/2016   • Anemia    • Anxiety    • ASCUS of cervix with negative high risk HPV 8/1/2018   • Asthma    • Depression    • Dizziness    • GERD (gastroesophageal reflux disease)    • H/O ETOH abuse    • Multiple URI    • PCOS (polycystic ovarian syndrome) 5/6/2016   • PCOS (polycystic ovarian syndrome)    • Pneumonia    • Viral infection        Past Surgical History:   Procedure Laterality Date   • DENTAL PROCEDURE     • ENDOSCOPY N/A 7/14/2018    Procedure: ESOPHAGOGASTRODUODENOSCOPY WITH COLD BIOPSIES;  Surgeon: Damon Morrison MD;  Location: [facility] ENDOSCOPY;  Service: Gastroenterology   • VAGINAL SEPTUM RESECTION  2012    Excision   • WISDOM TOOTH EXTRACTION  02/2016       Family History   Problem Relation Age of Onset   • Anxiety disorder Mother    • Depression Mother    • CALDERON disease Mother    • Hypertension Maternal  Grandmother    • Hyperlipidemia Maternal Grandmother    • Other Maternal Grandmother         GERD   • CALDERON disease Father    • Pancreatitis Father    • Heart disease Maternal Grandfather    • Heart disease Paternal Grandfather    • Breast cancer Neg Hx    • Ovarian cancer Neg Hx    • Uterine cancer Neg Hx    • Colon cancer Neg Hx        Social History     Socioeconomic History   • Marital status: Single     Spouse name: Not on file   • Number of children: 0   • Years of education: Not on file   • Highest education level: Not on file   Occupational History   • Occupation: unemployed   Tobacco Use   • Smoking status: Current Some Day Smoker     Types: Cigars   • Smokeless tobacco: Never Used   Substance and Sexual Activity   • Alcohol use: Yes     Frequency: 2-4 times a month     Drinks per session: 3 or 4     Comment: 3 to 4 drinks per month    • Drug use: Yes     Types: Marijuana     Comment: uses twice weekly    • Sexual activity: Yes     Partners: Male     Birth control/protection: Condom   Social History Narrative    Last Pelvic/PAP 2016         Current Outpatient Medications:   •  albuterol sulfate  (90 Base) MCG/ACT inhaler, Inhale 2 puffs Every 6 (Six) Hours As Needed for Wheezing or Shortness of Air., Disp: 6.7 g, Rfl: 0  •  ondansetron ODT (Zofran ODT) 4 MG disintegrating tablet, Place 1 tablet on the tongue Every 8 (Eight) Hours As Needed for Nausea or Vomiting., Disp: 15 tablet, Rfl: 0  •  Probiotic Product (PROBIOTIC-10 PO), Take  by mouth., Disp: , Rfl:   •  QUEtiapine (SEROquel) 50 MG tablet, Take 3 tablets by mouth Every Night., Disp: 90 tablet, Rfl: 0  •  venlafaxine XR (EFFEXOR-XR) 75 MG 24 hr capsule, Take 1 capsule by mouth Daily., Disp: 30 capsule, Rfl: 0  •  sucralfate (CARAFATE) 1 g tablet, Take 1 tablet by mouth 4 (Four) Times a Day., Disp: 28 tablet, Rfl: 0    Current Facility-Administered Medications:   •  cyanocobalamin injection 1,000 mcg, 1,000 mcg, Intramuscular, Q28 Days, Gamaliel  ARIANA Najera, 1,000 mcg at 12/18/18 1440    Review of Systems   Constitutional: Negative for fatigue and fever.   Respiratory: Negative for cough, shortness of breath and wheezing.    Cardiovascular: Negative for chest pain.   Gastrointestinal: Negative for abdominal pain, constipation, diarrhea, nausea and vomiting.   Genitourinary: Negative for dysuria and urgency.   Skin: Negative.    Neurological: Negative for dizziness and headache.   Psychiatric/Behavioral: Positive for dysphoric mood (when didn't take medication on time). Negative for sleep disturbance, suicidal ideas and depressed mood. The patient is not nervous/anxious.         Feels like she has twitching before bed with medication       Objective   There were no vitals filed for this visit.  There is no height or weight on file to calculate BMI.  Physical Exam  Telephone exam-unable to assess    Assessment/Plan   Alicia was seen today for manic behavior.    Diagnoses and all orders for this visit:    Bipolar 2 disorder (CMS/[facility])    Anxiety      Will continue current medication.   She states that the symptoms of twitching isn't one that bothers her enough she wants to trial another medication.     We will plan follow up in 6 months, sooner if she has any issues with mood or anxiety.       Time spent with patient care 12 minutes.              Patient Instructions   Bipolar 2 Disorder  Bipolar 2 disorder is a mental health disorder in which a person has episodes of emotional highs (gómez) and lows (depression). Bipolar 2 is different from other bipolar disorders because the manic episodes are not as high and do not last as long. This is called hypomania. People with bipolar 2 disorder usually go back and forth between hypomanic and depressive episodes.  What are the causes?  The cause of this condition is not known.  What increases the risk?  The following factors may make you more likely to develop this condition:  · Having a family member with the  disorder.  · An imbalance of certain chemicals in the brain (neurotransmitters).  · Stress, such as a death, illness, or financial problems.  · Certain conditions that affect the brain or spinal cord (neurologic conditions).  · Brain injury (trauma).  · Having another mental health disorder, such as:  ? Obsessive compulsive disorder.  ? Schizophrenia.  What are the signs or symptoms?  Symptoms of hypomania include:  · Very high self-esteem or self-confidence.  · Decreased need for sleep.  · Unusual talkativeness or feeling a need to keep talking. Speech may be very fast. It may seem like you cannot stop talking.  · Racing thoughts or constant talking, with quick shifts between topics that may or may not be related (flight of ideas).  · Decreased ability to focus or concentrate.  · Increased purposeful activity, such as work, studies, or social activity.  · Increased nonproductive activity. This could be pacing, squirming and fidgeting, or finger and toe tapping.  · Impulsive behavior and poor judgment. This may result in high-risk activities, such as having unprotected sex or spending a lot of money.  Symptoms of depression include:  · Feeling sad, hopeless, or helpless.  · Frequent or uncontrollable crying.  · Lack of feeling or caring about anything.  · Sleeping too much.  · Moving more slowly than usual.  · Not being able to enjoy things you used to enjoy.  · Desire to be alone all the time.  · Feeling guilty or worthless.  · Lack of energy or motivation.  · Trouble concentrating or remembering.  · Trouble making decisions.  · Increased appetite.  · Thoughts of death or desire to harm yourself.  How is this diagnosed?  To diagnose bipolar 2 disorder, your health care provider may ask about your:  · Emotional episodes.  · Medical history.  · Alcohol and drug use. This includes prescription medicines. Certain medical conditions and substances can cause symptoms that seem like bipolar disorder (secondary bipolar  disorder).  How is this treated?  Bipolar 2 disorder is a long-term (chronic) illness. It is best controlled with ongoing (continuous) treatment rather than being treated only when symptoms occur. Treatment may include:  · Psychotherapy. Some forms of talk therapy, such as cognitive-behavioral therapy (CBT), can provide support, education, and guidance.  · Coping strategies, such as journaling or relaxation exercises. Relaxation exercises include:  ? Yoga.  ? Meditation.  ? Deep breathing.  · Lifestyle changes, such as:  ? Limiting alcohol and drug use.  ? Exercising regularly.  ? Getting plenty of sleep.  ? Making healthy eating choices.  · Medicine. Medicine can be prescribed by a health care provider who specializes in treating mental disorders (psychiatrist).  ? Medicines called mood stabilizers are usually prescribed.  ? If symptoms occur even while taking a mood stabilizer, other medicines may be added.  A combination of medicine, talk therapy, and coping methods is the best way to treat this condition.  Follow these instructions at home:  Activity  · Return to your normal activities as told by your health care provider.  · Find activities that you enjoy, and make time to do them.  · Exercise regularly as told by your health care provider.  Lifestyle  · Limit alcohol intake to no more than 1 drink a day for nonpregnant women and 2 drinks a day for men. One drink equals 12 oz of beer, 5 oz of wine, or 1½ oz of hard liquor.  · Follow a set schedule for eating and sleeping.  · Eat a balanced diet that includes fresh fruits and vegetables, whole grains, low-fat dairy, and lean meats.  · Get at least 7-8 hours of sleep each night.  General instructions  · Take over-the-counter and prescription medicines only as told by your health care provider.  · Think about joining a support group. Your health care provider may be able to recommend a support group.  · Talk with your family and loved ones about your treatment  goals and how they can help.  · Keep all follow-up visits as told by your health care provider. This is important.  Where to find more information  For more information about bipolar 2 disorder, visit the following websites:  · National [place] on Mental Illness: www.alessio.org  · [place] National [place] of Mental Health: www.nimh.nih.gov  Contact a health care provider if:  · Your symptoms get worse.  · You have side effects from your medicine, and they get worse.  · You have trouble sleeping.  · You have trouble doing daily activities.  · You feel unsafe in your surroundings.  · You are dealing with substance abuse.  Get help right away if:  · You have new symptoms.  · You have thoughts about harming yourself or others.  · You harm yourself.  Summary  · Bipolar 2 disorder is a mental health disorder in which a person has episodes of hypomania and depression.  · Bipolar 2 is best treated through a combination of medicines, talk therapy, and coping strategies.  · Talk with your family and loved ones about your treatment goals and how they can help.  This information is not intended to replace advice given to you by your health care provider. Make sure you discuss any questions you have with your health care provider.  Document Released: 01/23/2018 Document Revised: 11/30/2018 Document Reviewed: 01/23/2018  Elsevier Patient Education © 2020 [employer]

## 2020-06-16 NOTE — PATIENT INSTRUCTIONS
Bipolar 2 Disorder  Bipolar 2 disorder is a mental health disorder in which a person has episodes of emotional highs (gómez) and lows (depression). Bipolar 2 is different from other bipolar disorders because the manic episodes are not as high and do not last as long. This is called hypomania. People with bipolar 2 disorder usually go back and forth between hypomanic and depressive episodes.  What are the causes?  The cause of this condition is not known.  What increases the risk?  The following factors may make you more likely to develop this condition:  · Having a family member with the disorder.  · An imbalance of certain chemicals in the brain (neurotransmitters).  · Stress, such as a death, illness, or financial problems.  · Certain conditions that affect the brain or spinal cord (neurologic conditions).  · Brain injury (trauma).  · Having another mental health disorder, such as:  ? Obsessive compulsive disorder.  ? Schizophrenia.  What are the signs or symptoms?  Symptoms of hypomania include:  · Very high self-esteem or self-confidence.  · Decreased need for sleep.  · Unusual talkativeness or feeling a need to keep talking. Speech may be very fast. It may seem like you cannot stop talking.  · Racing thoughts or constant talking, with quick shifts between topics that may or may not be related (flight of ideas).  · Decreased ability to focus or concentrate.  · Increased purposeful activity, such as work, studies, or social activity.  · Increased nonproductive activity. This could be pacing, squirming and fidgeting, or finger and toe tapping.  · Impulsive behavior and poor judgment. This may result in high-risk activities, such as having unprotected sex or spending a lot of money.  Symptoms of depression include:  · Feeling sad, hopeless, or helpless.  · Frequent or uncontrollable crying.  · Lack of feeling or caring about anything.  · Sleeping too much.  · Moving more slowly than usual.  · Not being able to  enjoy things you used to enjoy.  · Desire to be alone all the time.  · Feeling guilty or worthless.  · Lack of energy or motivation.  · Trouble concentrating or remembering.  · Trouble making decisions.  · Increased appetite.  · Thoughts of death or desire to harm yourself.  How is this diagnosed?  To diagnose bipolar 2 disorder, your health care provider may ask about your:  · Emotional episodes.  · Medical history.  · Alcohol and drug use. This includes prescription medicines. Certain medical conditions and substances can cause symptoms that seem like bipolar disorder (secondary bipolar disorder).  How is this treated?  Bipolar 2 disorder is a long-term (chronic) illness. It is best controlled with ongoing (continuous) treatment rather than being treated only when symptoms occur. Treatment may include:  · Psychotherapy. Some forms of talk therapy, such as cognitive-behavioral therapy (CBT), can provide support, education, and guidance.  · Coping strategies, such as journaling or relaxation exercises. Relaxation exercises include:  ? Yoga.  ? Meditation.  ? Deep breathing.  · Lifestyle changes, such as:  ? Limiting alcohol and drug use.  ? Exercising regularly.  ? Getting plenty of sleep.  ? Making healthy eating choices.  · Medicine. Medicine can be prescribed by a health care provider who specializes in treating mental disorders (psychiatrist).  ? Medicines called mood stabilizers are usually prescribed.  ? If symptoms occur even while taking a mood stabilizer, other medicines may be added.  A combination of medicine, talk therapy, and coping methods is the best way to treat this condition.  Follow these instructions at home:  Activity  · Return to your normal activities as told by your health care provider.  · Find activities that you enjoy, and make time to do them.  · Exercise regularly as told by your health care provider.  Lifestyle  · Limit alcohol intake to no more than 1 drink a day for nonpregnant women  and 2 drinks a day for men. One drink equals 12 oz of beer, 5 oz of wine, or 1½ oz of hard liquor.  · Follow a set schedule for eating and sleeping.  · Eat a balanced diet that includes fresh fruits and vegetables, whole grains, low-fat dairy, and lean meats.  · Get at least 7-8 hours of sleep each night.  General instructions  · Take over-the-counter and prescription medicines only as told by your health care provider.  · Think about joining a support group. Your health care provider may be able to recommend a support group.  · Talk with your family and loved ones about your treatment goals and how they can help.  · Keep all follow-up visits as told by your health care provider. This is important.  Where to find more information  For more information about bipolar 2 disorder, visit the following websites:  · National [place] on Mental Illness: www.alessio.org  · [place] National [place] of Mental Health: www.nimh.nih.gov  Contact a health care provider if:  · Your symptoms get worse.  · You have side effects from your medicine, and they get worse.  · You have trouble sleeping.  · You have trouble doing daily activities.  · You feel unsafe in your surroundings.  · You are dealing with substance abuse.  Get help right away if:  · You have new symptoms.  · You have thoughts about harming yourself or others.  · You harm yourself.  Summary  · Bipolar 2 disorder is a mental health disorder in which a person has episodes of hypomania and depression.  · Bipolar 2 is best treated through a combination of medicines, talk therapy, and coping strategies.  · Talk with your family and loved ones about your treatment goals and how they can help.  This information is not intended to replace advice given to you by your health care provider. Make sure you discuss any questions you have with your health care provider.  Document Released: 01/23/2018 Document Revised: 11/30/2018 Document Reviewed: 01/23/2018  Elsevier Patient Education ©  2020 [employer]

## 2020-07-12 PROCEDURE — 99284 EMERGENCY DEPT VISIT MOD MDM: CPT

## 2020-07-13 ENCOUNTER — HOSPITAL ENCOUNTER (EMERGENCY)
Facility: HOSPITAL | Age: 25
Discharge: HOME | End: 2020-07-13
Attending: EMERGENCY MEDICINE | Admitting: EMERGENCY MEDICINE

## 2020-07-13 VITALS
TEMPERATURE: 97.1 F | DIASTOLIC BLOOD PRESSURE: 87 MMHG | RESPIRATION RATE: 16 BRPM | BODY MASS INDEX: 36.9 KG/M2 | HEART RATE: 81 BPM | HEIGHT: 64 IN | SYSTOLIC BLOOD PRESSURE: 157 MMHG | OXYGEN SATURATION: 99 %

## 2020-07-13 DIAGNOSIS — R10.13 EPIGASTRIC ABDOMINAL PAIN: Primary | ICD-10-CM

## 2020-07-13 DIAGNOSIS — R11.2 NON-INTRACTABLE VOMITING WITH NAUSEA, UNSPECIFIED VOMITING TYPE: ICD-10-CM

## 2020-07-13 LAB
ALBUMIN SERPL-MCNC: 4.6 G/DL (ref 3.5–5.2)
ALBUMIN/GLOB SERPL: 1.4 G/DL
ALP SERPL-CCNC: 70 U/L (ref 39–117)
ALT SERPL W P-5'-P-CCNC: 19 U/L (ref 1–33)
ANION GAP SERPL CALCULATED.3IONS-SCNC: 15.5 MMOL/L (ref 5–15)
AST SERPL-CCNC: 18 U/L (ref 1–32)
BASOPHILS # BLD AUTO: 0.06 10*3/MM3 (ref 0–0.2)
BASOPHILS NFR BLD AUTO: 0.6 % (ref 0–1.5)
BILIRUB SERPL-MCNC: 0.2 MG/DL (ref 0–1.2)
BUN SERPL-MCNC: 8 MG/DL (ref 6–20)
BUN/CREAT SERPL: 9.6 (ref 7–25)
CALCIUM SPEC-SCNC: 9.5 MG/DL (ref 8.6–10.5)
CHLORIDE SERPL-SCNC: 106 MMOL/L (ref 98–107)
CO2 SERPL-SCNC: 20.5 MMOL/L (ref 22–29)
CREAT SERPL-MCNC: 0.83 MG/DL (ref 0.57–1)
DEPRECATED RDW RBC AUTO: 45.9 FL (ref 37–54)
EOSINOPHIL # BLD AUTO: 0.02 10*3/MM3 (ref 0–0.4)
EOSINOPHIL NFR BLD AUTO: 0.2 % (ref 0.3–6.2)
ERYTHROCYTE [DISTWIDTH] IN BLOOD BY AUTOMATED COUNT: 17.3 % (ref 12.3–15.4)
GFR SERPL CREATININE-BSD FRML MDRD: 85 ML/MIN/1.73
GLOBULIN UR ELPH-MCNC: 3.3 GM/DL
GLUCOSE SERPL-MCNC: 121 MG/DL (ref 65–99)
HCG SERPL QL: NEGATIVE
HCT VFR BLD AUTO: 36 % (ref 34–46.6)
HGB BLD-MCNC: 11 G/DL (ref 12–15.9)
LIPASE SERPL-CCNC: 21 U/L (ref 13–60)
LYMPHOCYTES # BLD AUTO: 1.91 10*3/MM3 (ref 0.7–3.1)
LYMPHOCYTES NFR BLD AUTO: 18.8 % (ref 19.6–45.3)
MCH RBC QN AUTO: 22.8 PG (ref 26.6–33)
MCHC RBC AUTO-ENTMCNC: 30.6 G/DL (ref 31.5–35.7)
MCV RBC AUTO: 74.5 FL (ref 79–97)
MONOCYTES # BLD AUTO: 0.38 10*3/MM3 (ref 0.1–0.9)
MONOCYTES NFR BLD AUTO: 3.7 % (ref 5–12)
NEUTROPHILS NFR BLD AUTO: 7.76 10*3/MM3 (ref 1.7–7)
NEUTROPHILS NFR BLD AUTO: 76.4 % (ref 42.7–76)
PLATELET # BLD AUTO: 521 10*3/MM3 (ref 140–450)
PMV BLD AUTO: 10.4 FL (ref 6–12)
POTASSIUM SERPL-SCNC: 3.8 MMOL/L (ref 3.5–5.2)
PROT SERPL-MCNC: 7.9 G/DL (ref 6–8.5)
RBC # BLD AUTO: 4.83 10*6/MM3 (ref 3.77–5.28)
SODIUM SERPL-SCNC: 142 MMOL/L (ref 136–145)
WBC # BLD AUTO: 10.16 10*3/MM3 (ref 3.4–10.8)

## 2020-07-13 PROCEDURE — 25010000002 ONDANSETRON PER 1 MG: Performed by: EMERGENCY MEDICINE

## 2020-07-13 PROCEDURE — 85025 COMPLETE CBC W/AUTO DIFF WBC: CPT | Performed by: EMERGENCY MEDICINE

## 2020-07-13 PROCEDURE — 84703 CHORIONIC GONADOTROPIN ASSAY: CPT | Performed by: EMERGENCY MEDICINE

## 2020-07-13 PROCEDURE — 96374 THER/PROPH/DIAG INJ IV PUSH: CPT

## 2020-07-13 PROCEDURE — 96375 TX/PRO/DX INJ NEW DRUG ADDON: CPT

## 2020-07-13 PROCEDURE — 25010000002 HYDROMORPHONE PER 4 MG: Performed by: EMERGENCY MEDICINE

## 2020-07-13 PROCEDURE — 80053 COMPREHEN METABOLIC PANEL: CPT | Performed by: EMERGENCY MEDICINE

## 2020-07-13 PROCEDURE — 96376 TX/PRO/DX INJ SAME DRUG ADON: CPT

## 2020-07-13 PROCEDURE — 25010000002 HYDROMORPHONE 1 MG/ML SOLUTION: Performed by: EMERGENCY MEDICINE

## 2020-07-13 PROCEDURE — 83690 ASSAY OF LIPASE: CPT | Performed by: EMERGENCY MEDICINE

## 2020-07-13 PROCEDURE — 25010000002 PROMETHAZINE PER 50 MG: Performed by: EMERGENCY MEDICINE

## 2020-07-13 RX ORDER — PROMETHAZINE HYDROCHLORIDE 25 MG/1
25 SUPPOSITORY RECTAL EVERY 8 HOURS PRN
Qty: 10 SUPPOSITORY | Refills: 0 | OUTPATIENT
Start: 2020-07-13 | End: 2020-07-15

## 2020-07-13 RX ORDER — ONDANSETRON 2 MG/ML
4 INJECTION INTRAMUSCULAR; INTRAVENOUS ONCE
Status: COMPLETED | OUTPATIENT
Start: 2020-07-13 | End: 2020-07-13

## 2020-07-13 RX ORDER — HYDROMORPHONE HYDROCHLORIDE 1 MG/ML
0.5 INJECTION, SOLUTION INTRAMUSCULAR; INTRAVENOUS; SUBCUTANEOUS ONCE
Status: COMPLETED | OUTPATIENT
Start: 2020-07-13 | End: 2020-07-13

## 2020-07-13 RX ORDER — SUCRALFATE ORAL 1 G/10ML
1 SUSPENSION ORAL
Qty: 420 ML | Refills: 0 | Status: SHIPPED | OUTPATIENT
Start: 2020-07-13 | End: 2020-07-20 | Stop reason: HOSPADM

## 2020-07-13 RX ORDER — PROMETHAZINE HYDROCHLORIDE 25 MG/ML
12.5 INJECTION, SOLUTION INTRAMUSCULAR; INTRAVENOUS ONCE
Status: COMPLETED | OUTPATIENT
Start: 2020-07-13 | End: 2020-07-13

## 2020-07-13 RX ORDER — SODIUM CHLORIDE 0.9 % (FLUSH) 0.9 %
10 SYRINGE (ML) INJECTION AS NEEDED
Status: DISCONTINUED | OUTPATIENT
Start: 2020-07-13 | End: 2020-07-13 | Stop reason: HOSPADM

## 2020-07-13 RX ORDER — FAMOTIDINE 10 MG/ML
20 INJECTION, SOLUTION INTRAVENOUS ONCE
Status: COMPLETED | OUTPATIENT
Start: 2020-07-13 | End: 2020-07-13

## 2020-07-13 RX ADMIN — FAMOTIDINE 20 MG: 10 INJECTION, SOLUTION INTRAVENOUS at 01:02

## 2020-07-13 RX ADMIN — ONDANSETRON 4 MG: 2 INJECTION INTRAMUSCULAR; INTRAVENOUS at 01:01

## 2020-07-13 RX ADMIN — SODIUM CHLORIDE 1000 ML: 9 INJECTION, SOLUTION INTRAVENOUS at 01:00

## 2020-07-13 RX ADMIN — PROMETHAZINE HYDROCHLORIDE 12.5 MG: 25 INJECTION INTRAMUSCULAR; INTRAVENOUS at 02:03

## 2020-07-13 RX ADMIN — HYDROMORPHONE HYDROCHLORIDE 1 MG: 1 INJECTION, SOLUTION INTRAMUSCULAR; INTRAVENOUS; SUBCUTANEOUS at 01:03

## 2020-07-13 RX ADMIN — HYDROMORPHONE HYDROCHLORIDE 0.5 MG: 1 INJECTION, SOLUTION INTRAMUSCULAR; INTRAVENOUS; SUBCUTANEOUS at 02:03

## 2020-07-13 NOTE — ED PROVIDER NOTES
EMERGENCY DEPARTMENT ENCOUNTER    CHIEF COMPLAINT  Chief Complaint: Abdominal pain  History given by: Patient/family member  History limited by: None  Room Number: 09/09  PMD: Arian Barnard, MARIBELL      HPI:  Pt is a 24 y.o. female who presents complaining of epigastric/upper abdominal pain was fairly sudden in onset earlier yesterday in the morning, but has been gradually worsening throughout the day till its maximal point now.  Per family, symptoms are moderate to severe in intensity.  The patient states that the pain is a sharp sensation without radiation and is associated with nausea and vomiting.  Per family, they did give her oral disintegrating Zofran and Pepcid prior to arrival which did not improve her symptoms.  Nothing thus far has alleviated or exacerbated her symptoms.  The patient states that there is no associated chest pain, back pain, diarrhea or constipation, fever/chills, headache, dizziness, or any known sick contacts.  The family member says that this is similar to when she presented in May but worse today.  They have not yet been able to follow with gastroenterology.      PAST MEDICAL HISTORY  Active Ambulatory Problems     Diagnosis Date Noted   • PCOS (polycystic ovarian syndrome) 05/06/2016   • Hirsutism 05/06/2016   • Obesity 05/06/2016   • Nausea 07/13/2018   • Pain of upper abdomen 07/13/2018   • Non-intractable vomiting with nausea 07/13/2018     Resolved Ambulatory Problems     Diagnosis Date Noted   • Abnormal uterine bleeding (AUB) 05/06/2016   • ASCUS of cervix with negative high risk HPV 08/01/2018     Past Medical History:   Diagnosis Date   • Anemia    • Anxiety    • Asthma    • Depression    • Dizziness    • GERD (gastroesophageal reflux disease)    • H/O ETOH abuse    • Multiple URI    • Pneumonia    • Viral infection        PAST SURGICAL HISTORY  Past Surgical History:   Procedure Laterality Date   • DENTAL PROCEDURE     • ENDOSCOPY N/A 7/14/2018    Procedure:  ESOPHAGOGASTRODUODENOSCOPY WITH COLD BIOPSIES;  Surgeon: Damon Morrison MD;  Location: [facility] ENDOSCOPY;  Service: Gastroenterology   • VAGINAL SEPTUM RESECTION  2012    Excision   • WISDOM TOOTH EXTRACTION  02/2016       FAMILY HISTORY  Family History   Problem Relation Age of Onset   • Anxiety disorder Mother    • Depression Mother    • CALDERON disease Mother    • Hypertension Maternal Grandmother    • Hyperlipidemia Maternal Grandmother    • Other Maternal Grandmother         GERD   • CALDERON disease Father    • Pancreatitis Father    • Heart disease Maternal Grandfather    • Heart disease Paternal Grandfather    • Breast cancer Neg Hx    • Ovarian cancer Neg Hx    • Uterine cancer Neg Hx    • Colon cancer Neg Hx        SOCIAL HISTORY  Social History     Socioeconomic History   • Marital status: Single     Spouse name: Not on file   • Number of children: 0   • Years of education: Not on file   • Highest education level: Not on file   Occupational History   • Occupation: unemployed   Tobacco Use   • Smoking status: Current Some Day Smoker     Types: Cigars   • Smokeless tobacco: Never Used   Substance and Sexual Activity   • Alcohol use: Yes     Frequency: 2-4 times a month     Drinks per session: 3 or 4     Comment: 3 to 4 drinks per month    • Drug use: Yes     Types: Marijuana     Comment: uses twice weekly    • Sexual activity: Yes     Partners: Male     Birth control/protection: Condom   Social History Narrative    Last Pelvic/PAP 2016       ALLERGIES  Acetaminophen    REVIEW OF SYSTEMS  Review of Systems   Constitutional: Negative for fever.   HENT: Negative for sore throat.    Eyes: Negative.    Respiratory: Negative for cough and shortness of breath.    Cardiovascular: Negative for chest pain.   Gastrointestinal: Positive for abdominal pain, nausea and vomiting. Negative for diarrhea.   Genitourinary: Negative for dysuria.   Musculoskeletal: Negative for neck pain.   Skin: Negative for rash.    Allergic/Immunologic: Negative.    Neurological: Negative for weakness, numbness and headaches.   Hematological: Negative.    Psychiatric/Behavioral: Negative.    All other systems reviewed and are negative.      PHYSICAL EXAM  ED Triage Vitals [07/12/20 2306]   Temp Heart Rate Resp BP SpO2   97.1 °F (36.2 °C) 100 20 -- 99 %      Temp src Heart Rate Source Patient Position BP Location FiO2 (%)   Tympanic Monitor -- -- --       Physical Exam   Constitutional: She is oriented to person, place, and time. She appears distressed.   Patient is actively dry heaving   HENT:   Head: Normocephalic and atraumatic.   Eyes: Pupils are equal, round, and reactive to light. EOM are normal.   Neck: Normal range of motion. Neck supple.   Cardiovascular: Normal rate, regular rhythm and normal heart sounds.   Pulmonary/Chest: Effort normal and breath sounds normal. No respiratory distress.   Abdominal: Soft. There is tenderness in the epigastric area. There is no rebound and no guarding.   Musculoskeletal: Normal range of motion. She exhibits no edema.   Neurological: She is alert and oriented to person, place, and time. She has normal sensation and normal strength.   Skin: Skin is warm and dry. No rash noted.   Psychiatric: Mood and affect normal.   Nursing note and vitals reviewed.      LAB RESULTS  Lab Results (last 24 hours)     Procedure Component Value Units Date/Time    CBC & Differential [[id number]] Collected:  07/13/20 0043    Specimen:  Blood Updated:  07/13/20 0103    Narrative:       The following orders were created for panel order CBC & Differential.  Procedure                               Abnormality         Status                     ---------                               -----------         ------                     CBC Auto Differential[[id number]]        Abnormal            Final result                 Please view results for these tests on the individual orders.    Comprehensive Metabolic Panel [[id number]]   (Abnormal) Collected:  07/13/20 0043    Specimen:  Blood Updated:  07/13/20 0113     Glucose 121 mg/dL      BUN 8 mg/dL      Creatinine 0.83 mg/dL      Sodium 142 mmol/L      Potassium 3.8 mmol/L      Chloride 106 mmol/L      CO2 20.5 mmol/L      Calcium 9.5 mg/dL      Total Protein 7.9 g/dL      Albumin 4.60 g/dL      ALT (SGPT) 19 U/L      AST (SGOT) 18 U/L      Alkaline Phosphatase 70 U/L      Total Bilirubin 0.2 mg/dL      eGFR Non African Amer 85 mL/min/1.73      Globulin 3.3 gm/dL      A/G Ratio 1.4 g/dL      BUN/Creatinine Ratio 9.6     Anion Gap 15.5 mmol/L     Narrative:       GFR Normal >60  Chronic Kidney Disease <60  Kidney Failure <15      Lipase [[id number]]  (Normal) Collected:  07/13/20 0043    Specimen:  Blood Updated:  07/13/20 0113     Lipase 21 U/L     hCG, Serum, Qualitative [[id number]]  (Normal) Collected:  07/13/20 0043    Specimen:  Blood Updated:  07/13/20 0111     HCG Qualitative Negative    CBC Auto Differential [[id number]]  (Abnormal) Collected:  07/13/20 0043    Specimen:  Blood Updated:  07/13/20 0103     WBC 10.16 10*3/mm3      RBC 4.83 10*6/mm3      Hemoglobin 11.0 g/dL      Hematocrit 36.0 %      MCV 74.5 fL      MCH 22.8 pg      MCHC 30.6 g/dL      RDW 17.3 %      RDW-SD 45.9 fl      MPV 10.4 fL      Platelets 521 10*3/mm3      Neutrophil % 76.4 %      Lymphocyte % 18.8 %      Monocyte % 3.7 %      Eosinophil % 0.2 %      Basophil % 0.6 %      Neutrophils, Absolute 7.76 10*3/mm3      Lymphocytes, Absolute 1.91 10*3/mm3      Monocytes, Absolute 0.38 10*3/mm3      Eosinophils, Absolute 0.02 10*3/mm3      Basophils, Absolute 0.06 10*3/mm3           I ordered the above labs and reviewed the results    RADIOLOGY  No orders to display        I ordered the above noted radiological studies. Interpreted by radiologist.  Reviewed by me in PACS.       PROCEDURES  Procedures      PROGRESS AND CONSULTS     The patient was wearing a facemask upon entrance into the room and remained in such  throughout their visit.  I was wearing PPE including a facemask as well as gloves at any point entering the room and throughout the visit    0315  Upon reevaluation, the patient states that she feels exceedingly better than when she arrived here.  I did discuss with her the unremarkable laboratory results.  I did tell her that I would add Carafate as well as Phenergan suppositories into her medication regimen and that she will strongly need follow-up with gastroenterology.  The patient as well as family member understands and all questions have been answered.      MEDICAL DECISION MAKING  Results were reviewed/discussed with the patient and they were also made aware of online access. Pt also made aware that some labs, such as cultures, will not be resulted during ER visit and follow up with PMD is necessary.     MDM  Number of Diagnoses or Management Options     Amount and/or Complexity of Data Reviewed  Clinical lab tests: ordered and reviewed  Review and summarize past medical records: yes (Upon medical records review, the patient was seen in the emergency room on 5/29/2020 secondary to epigastric pain.  CT scan of the abdomen and pelvis during that visit was unremarkable other than a left ovarian cyst)           DIAGNOSIS  Final diagnoses:   Epigastric abdominal pain   Non-intractable vomiting with nausea, unspecified vomiting type       DISPOSITION  DISCHARGE    Patient discharged in stable condition.    Reviewed implications of results, diagnosis, meds, responsibility to follow up, warning signs and symptoms of possible worsening, potential complications and reasons to return to ER    Patient/Family voiced understanding of above instructions.    Discussed plan for discharge, as there is no emergent indication for admission. Patient referred to primary care provider for BP management due to today's BP. Pt/family is agreeable and understands need for follow up and repeat testing.  Pt is aware that discharge does  not mean that nothing is wrong but it indicates no emergency is present that requires admission and they must continue care with follow-up as given below or physician of their choice.     FOLLOW-UP  Arian Barnard, APRN  [address]  [facility] [address]  [facility] [address] [phone]    Schedule an appointment as soon as possible for a visit       Axel Ferrell MD  [address]  [facility] [address]  [facility] [id number] [phone]    Schedule an appointment as soon as possible for a visit            Medication List      New Prescriptions    promethazine 25 MG suppository  Commonly known as:  PHENERGAN  Insert 1 suppository into the rectum Every 8 (Eight) Hours As Needed for   Nausea or Vomiting.        Changed    * sucralfate 1 g tablet  Commonly known as:  CARAFATE  Take 1 tablet by mouth 4 (Four) Times a Day.  What changed:  Another medication with the same name was added. Make sure   you understand how and when to take each.     * sucralfate 1 GM/10ML suspension  Commonly known as:  CARAFATE  Take 10 mL by mouth 4 (Four) Times a Day With Meals & at Bedtime.  What changed:  You were already taking a medication with the same name,   and this prescription was added. Make sure you understand how and when to   take each.         * This list has 2 medication(s) that are the same as other medications   prescribed for you. Read the directions carefully, and ask your doctor or   other care provider to review them with you.                  Latest Documented Vital Signs:  As of 06:36  BP- 157/87 HR- 81 Temp- 97.1 °F (36.2 °C) (Tympanic) O2 sat- 99%           Darren Collins MD  07/13/20 0636

## 2020-07-13 NOTE — ED NOTES
Pt unable to provide a urine specimen at this time. Call light within reach and pt will notify us when possible.     Mason Harding  07/13/20 0154

## 2020-07-13 NOTE — ED TRIAGE NOTES
Pt c/o generalized abd pain with n/v that started earlier today. Denies fever. Reports chills.    Pt placed in mask on arrival. Staff wearing mask and goggles at time of triage.

## 2020-07-15 ENCOUNTER — HOSPITAL ENCOUNTER (EMERGENCY)
Facility: HOSPITAL | Age: 25
Discharge: HOME | End: 2020-07-15
Attending: EMERGENCY MEDICINE | Admitting: EMERGENCY MEDICINE

## 2020-07-15 ENCOUNTER — APPOINTMENT (OUTPATIENT)
Dept: ULTRASOUND IMAGING | Facility: HOSPITAL | Age: 25
End: 2020-07-15

## 2020-07-15 VITALS
TEMPERATURE: 96.9 F | RESPIRATION RATE: 18 BRPM | DIASTOLIC BLOOD PRESSURE: 72 MMHG | OXYGEN SATURATION: 98 % | WEIGHT: 200 LBS | BODY MASS INDEX: 34.15 KG/M2 | HEIGHT: 64 IN | HEART RATE: 76 BPM | SYSTOLIC BLOOD PRESSURE: 127 MMHG

## 2020-07-15 DIAGNOSIS — R10.13 EPIGASTRIC PAIN: ICD-10-CM

## 2020-07-15 DIAGNOSIS — R11.2 NON-INTRACTABLE VOMITING WITH NAUSEA, UNSPECIFIED VOMITING TYPE: Primary | ICD-10-CM

## 2020-07-15 LAB
ALBUMIN SERPL-MCNC: 4.6 G/DL (ref 3.5–5.2)
ALBUMIN/GLOB SERPL: 1.4 G/DL
ALP SERPL-CCNC: 69 U/L (ref 39–117)
ALT SERPL W P-5'-P-CCNC: 14 U/L (ref 1–33)
AMORPH URATE CRY URNS QL MICRO: ABNORMAL /HPF
ANION GAP SERPL CALCULATED.3IONS-SCNC: 17.1 MMOL/L (ref 5–15)
AST SERPL-CCNC: 14 U/L (ref 1–32)
BACTERIA UR QL AUTO: ABNORMAL /HPF
BASOPHILS # BLD AUTO: 0.07 10*3/MM3 (ref 0–0.2)
BASOPHILS NFR BLD AUTO: 0.7 % (ref 0–1.5)
BILIRUB SERPL-MCNC: 0.2 MG/DL (ref 0–1.2)
BILIRUB UR QL STRIP: NEGATIVE
BUN SERPL-MCNC: 9 MG/DL (ref 6–20)
BUN/CREAT SERPL: 12.2 (ref 7–25)
CALCIUM SPEC-SCNC: 10 MG/DL (ref 8.6–10.5)
CHLORIDE SERPL-SCNC: 101 MMOL/L (ref 98–107)
CLARITY UR: ABNORMAL
CO2 SERPL-SCNC: 23.9 MMOL/L (ref 22–29)
COLOR UR: YELLOW
CREAT SERPL-MCNC: 0.74 MG/DL (ref 0.57–1)
DEPRECATED RDW RBC AUTO: 43 FL (ref 37–54)
EOSINOPHIL # BLD AUTO: 0.21 10*3/MM3 (ref 0–0.4)
EOSINOPHIL NFR BLD AUTO: 2.1 % (ref 0.3–6.2)
ERYTHROCYTE [DISTWIDTH] IN BLOOD BY AUTOMATED COUNT: 16.8 % (ref 12.3–15.4)
GFR SERPL CREATININE-BSD FRML MDRD: 97 ML/MIN/1.73
GLOBULIN UR ELPH-MCNC: 3.2 GM/DL
GLUCOSE SERPL-MCNC: 96 MG/DL (ref 65–99)
GLUCOSE UR STRIP-MCNC: NEGATIVE MG/DL
HCG SERPL QL: NEGATIVE
HCT VFR BLD AUTO: 35.4 % (ref 34–46.6)
HGB BLD-MCNC: 10.7 G/DL (ref 12–15.9)
HGB UR QL STRIP.AUTO: NEGATIVE
HOLD SPECIMEN: NORMAL
HYALINE CASTS UR QL AUTO: ABNORMAL /LPF
KETONES UR QL STRIP: ABNORMAL
LEUKOCYTE ESTERASE UR QL STRIP.AUTO: ABNORMAL
LIPASE SERPL-CCNC: 29 U/L (ref 13–60)
LYMPHOCYTES # BLD AUTO: 4.28 10*3/MM3 (ref 0.7–3.1)
LYMPHOCYTES NFR BLD AUTO: 43.8 % (ref 19.6–45.3)
MCH RBC QN AUTO: 22 PG (ref 26.6–33)
MCHC RBC AUTO-ENTMCNC: 30.2 G/DL (ref 31.5–35.7)
MCV RBC AUTO: 72.8 FL (ref 79–97)
MONOCYTES # BLD AUTO: 0.77 10*3/MM3 (ref 0.1–0.9)
MONOCYTES NFR BLD AUTO: 7.9 % (ref 5–12)
NEUTROPHILS NFR BLD AUTO: 4.43 10*3/MM3 (ref 1.7–7)
NEUTROPHILS NFR BLD AUTO: 45.3 % (ref 42.7–76)
NITRITE UR QL STRIP: NEGATIVE
PH UR STRIP.AUTO: >=9 [PH] (ref 5–8)
PLATELET # BLD AUTO: 518 10*3/MM3 (ref 140–450)
PMV BLD AUTO: 10.5 FL (ref 6–12)
POTASSIUM SERPL-SCNC: 3.5 MMOL/L (ref 3.5–5.2)
PROT SERPL-MCNC: 7.8 G/DL (ref 6–8.5)
PROT UR QL STRIP: ABNORMAL
RBC # BLD AUTO: 4.86 10*6/MM3 (ref 3.77–5.28)
RBC # UR: ABNORMAL /HPF
REF LAB TEST METHOD: ABNORMAL
SODIUM SERPL-SCNC: 142 MMOL/L (ref 136–145)
SP GR UR STRIP: 1.02 (ref 1–1.03)
SQUAMOUS #/AREA URNS HPF: ABNORMAL /HPF
UROBILINOGEN UR QL STRIP: ABNORMAL
WBC # BLD AUTO: 9.78 10*3/MM3 (ref 3.4–10.8)
WBC UR QL AUTO: ABNORMAL /HPF
WHOLE BLOOD HOLD SPECIMEN: NORMAL
WHOLE BLOOD HOLD SPECIMEN: NORMAL

## 2020-07-15 PROCEDURE — 25010000002 HALOPERIDOL LACTATE PER 5 MG: Performed by: PHYSICIAN ASSISTANT

## 2020-07-15 PROCEDURE — 96376 TX/PRO/DX INJ SAME DRUG ADON: CPT

## 2020-07-15 PROCEDURE — 81001 URINALYSIS AUTO W/SCOPE: CPT | Performed by: PHYSICIAN ASSISTANT

## 2020-07-15 PROCEDURE — 83690 ASSAY OF LIPASE: CPT

## 2020-07-15 PROCEDURE — 25010000002 PROMETHAZINE PER 50 MG: Performed by: EMERGENCY MEDICINE

## 2020-07-15 PROCEDURE — 25010000002 ONDANSETRON PER 1 MG: Performed by: PHYSICIAN ASSISTANT

## 2020-07-15 PROCEDURE — 84703 CHORIONIC GONADOTROPIN ASSAY: CPT

## 2020-07-15 PROCEDURE — 25010000002 HYDROMORPHONE PER 4 MG: Performed by: EMERGENCY MEDICINE

## 2020-07-15 PROCEDURE — 25010000002 PROMETHAZINE PER 50 MG: Performed by: PHYSICIAN ASSISTANT

## 2020-07-15 PROCEDURE — 96375 TX/PRO/DX INJ NEW DRUG ADDON: CPT

## 2020-07-15 PROCEDURE — 80053 COMPREHEN METABOLIC PANEL: CPT

## 2020-07-15 PROCEDURE — 99284 EMERGENCY DEPT VISIT MOD MDM: CPT

## 2020-07-15 PROCEDURE — 85025 COMPLETE CBC W/AUTO DIFF WBC: CPT

## 2020-07-15 PROCEDURE — 76705 ECHO EXAM OF ABDOMEN: CPT

## 2020-07-15 PROCEDURE — 96374 THER/PROPH/DIAG INJ IV PUSH: CPT

## 2020-07-15 RX ORDER — ONDANSETRON 2 MG/ML
4 INJECTION INTRAMUSCULAR; INTRAVENOUS ONCE
Status: COMPLETED | OUTPATIENT
Start: 2020-07-15 | End: 2020-07-15

## 2020-07-15 RX ORDER — LIDOCAINE HYDROCHLORIDE 20 MG/ML
15 SOLUTION OROPHARYNGEAL ONCE
Status: COMPLETED | OUTPATIENT
Start: 2020-07-15 | End: 2020-07-15

## 2020-07-15 RX ORDER — HYDROMORPHONE HYDROCHLORIDE 1 MG/ML
0.5 INJECTION, SOLUTION INTRAMUSCULAR; INTRAVENOUS; SUBCUTANEOUS ONCE
Status: COMPLETED | OUTPATIENT
Start: 2020-07-15 | End: 2020-07-15

## 2020-07-15 RX ORDER — HALOPERIDOL 5 MG/ML
2 INJECTION INTRAMUSCULAR ONCE
Status: COMPLETED | OUTPATIENT
Start: 2020-07-15 | End: 2020-07-15

## 2020-07-15 RX ORDER — PANTOPRAZOLE SODIUM 40 MG/1
40 TABLET, DELAYED RELEASE ORAL DAILY
Qty: 21 TABLET | Refills: 0 | Status: SHIPPED | OUTPATIENT
Start: 2020-07-15 | End: 2020-07-20 | Stop reason: HOSPADM

## 2020-07-15 RX ORDER — PROMETHAZINE HYDROCHLORIDE 25 MG/ML
12.5 INJECTION, SOLUTION INTRAMUSCULAR; INTRAVENOUS ONCE
Status: COMPLETED | OUTPATIENT
Start: 2020-07-15 | End: 2020-07-15

## 2020-07-15 RX ORDER — SODIUM CHLORIDE 0.9 % (FLUSH) 0.9 %
10 SYRINGE (ML) INJECTION AS NEEDED
Status: DISCONTINUED | OUTPATIENT
Start: 2020-07-15 | End: 2020-07-15 | Stop reason: HOSPADM

## 2020-07-15 RX ORDER — ONDANSETRON 2 MG/ML
4 INJECTION INTRAMUSCULAR; INTRAVENOUS AS NEEDED
Status: DISCONTINUED | OUTPATIENT
Start: 2020-07-15 | End: 2020-07-15 | Stop reason: HOSPADM

## 2020-07-15 RX ORDER — ALUMINA, MAGNESIA, AND SIMETHICONE 2400; 2400; 240 MG/30ML; MG/30ML; MG/30ML
15 SUSPENSION ORAL ONCE
Status: COMPLETED | OUTPATIENT
Start: 2020-07-15 | End: 2020-07-15

## 2020-07-15 RX ORDER — PROMETHAZINE HYDROCHLORIDE 25 MG/1
25 SUPPOSITORY RECTAL EVERY 6 HOURS PRN
Qty: 24 SUPPOSITORY | Refills: 0 | Status: SHIPPED | OUTPATIENT
Start: 2020-07-15 | End: 2020-07-20 | Stop reason: HOSPADM

## 2020-07-15 RX ADMIN — ONDANSETRON 4 MG: 2 INJECTION INTRAMUSCULAR; INTRAVENOUS at 04:15

## 2020-07-15 RX ADMIN — PROMETHAZINE HYDROCHLORIDE 12.5 MG: 25 INJECTION INTRAMUSCULAR; INTRAVENOUS at 02:03

## 2020-07-15 RX ADMIN — SODIUM CHLORIDE 1000 ML: 9 INJECTION, SOLUTION INTRAVENOUS at 01:51

## 2020-07-15 RX ADMIN — LIDOCAINE HYDROCHLORIDE 15 ML: 20 SOLUTION ORAL; TOPICAL at 02:51

## 2020-07-15 RX ADMIN — HYDROMORPHONE HYDROCHLORIDE 0.5 MG: 1 INJECTION, SOLUTION INTRAMUSCULAR; INTRAVENOUS; SUBCUTANEOUS at 04:08

## 2020-07-15 RX ADMIN — ONDANSETRON 4 MG: 2 INJECTION INTRAMUSCULAR; INTRAVENOUS at 00:45

## 2020-07-15 RX ADMIN — ALUMINUM HYDROXIDE, MAGNESIUM HYDROXIDE, AND DIMETHICONE 15 ML: 400; 400; 40 SUSPENSION ORAL at 02:51

## 2020-07-15 RX ADMIN — HYDROMORPHONE HYDROCHLORIDE 0.5 MG: 1 INJECTION, SOLUTION INTRAMUSCULAR; INTRAVENOUS; SUBCUTANEOUS at 02:03

## 2020-07-15 RX ADMIN — PROMETHAZINE HYDROCHLORIDE 12.5 MG: 25 INJECTION INTRAMUSCULAR; INTRAVENOUS at 01:52

## 2020-07-15 RX ADMIN — HALOPERIDOL LACTATE 2 MG: 5 INJECTION, SOLUTION INTRAMUSCULAR at 04:16

## 2020-07-15 NOTE — ED NOTES
This RN wearing all appropriate PPE including face mask, gloves, eye protection during any and all periods of contact w/ patient and at all times while in patient care areas; patient wearing mask at all times when this RN is present in exam room.     Laurie Key, RN  07/15/20 0614

## 2020-07-15 NOTE — ED PROVIDER NOTES
EMERGENCY DEPARTMENT ENCOUNTER    Room Number:  06/06  Date of encounter:  7/15/2020  PCP: Arian Barnard APRN  Historian: Patient      HPI:  Chief Complaint: Epigastric pain  A complete HPI/ROS/PMH/PSH/SH/FH are unobtainable due to: Nothing    Context: Alicia Powell is a 24 y.o. female who presents to the ED c/o ongoing intermittent epigastric pain.  Patient states the symptoms began 2 days ago in the morning and slowly progressed up until now.  She describes the pain as a 9 out of 10 and states that this associated with severe retching.  She denies any blood in the vomitus.  She was taking Zofran and Pepcid prior to arrival this evening without any relief.  She was seen in this emergency department yesterday evening and treated conservatively with good results.  She is waiting to get into see gastroenterology for further evaluation but has been unable to schedule an appointment at this time.  She denies NSAID, tobacco, and alcohol use.  She further denies dark tarry stools at this time.    In reviewing the patient's records she was seen here for the same May 29, 2020 in addition to last night.  At that time she had negative labs and negative CT abdomen pelvis.    PAST MEDICAL HISTORY  Active Ambulatory Problems     Diagnosis Date Noted   • PCOS (polycystic ovarian syndrome) 05/06/2016   • Hirsutism 05/06/2016   • Obesity 05/06/2016   • Nausea 07/13/2018   • Pain of upper abdomen 07/13/2018   • Non-intractable vomiting with nausea 07/13/2018     Resolved Ambulatory Problems     Diagnosis Date Noted   • Abnormal uterine bleeding (AUB) 05/06/2016   • ASCUS of cervix with negative high risk HPV 08/01/2018     Past Medical History:   Diagnosis Date   • Anemia    • Anxiety    • Asthma    • Depression    • Dizziness    • GERD (gastroesophageal reflux disease)    • H/O ETOH abuse    • Multiple URI    • Pneumonia    • Viral infection          PAST SURGICAL HISTORY  Past Surgical History:   Procedure Laterality Date   •  DENTAL PROCEDURE     • ENDOSCOPY N/A 7/14/2018    Procedure: ESOPHAGOGASTRODUODENOSCOPY WITH COLD BIOPSIES;  Surgeon: Damon Morrison MD;  Location: [facility] ENDOSCOPY;  Service: Gastroenterology   • VAGINAL SEPTUM RESECTION  2012    Excision   • WISDOM TOOTH EXTRACTION  02/2016         FAMILY HISTORY  Family History   Problem Relation Age of Onset   • Anxiety disorder Mother    • Depression Mother    • CALDERON disease Mother    • Hypertension Maternal Grandmother    • Hyperlipidemia Maternal Grandmother    • Other Maternal Grandmother         GERD   • CALDERON disease Father    • Pancreatitis Father    • Heart disease Maternal Grandfather    • Heart disease Paternal Grandfather    • Breast cancer Neg Hx    • Ovarian cancer Neg Hx    • Uterine cancer Neg Hx    • Colon cancer Neg Hx          SOCIAL HISTORY  Social History     Socioeconomic History   • Marital status: Single     Spouse name: Not on file   • Number of children: 0   • Years of education: Not on file   • Highest education level: Not on file   Occupational History   • Occupation: unemployed   Tobacco Use   • Smoking status: Current Some Day Smoker     Types: Cigars   • Smokeless tobacco: Never Used   Substance and Sexual Activity   • Alcohol use: Yes     Frequency: 2-4 times a month     Drinks per session: 3 or 4     Comment: 3 to 4 drinks per month    • Drug use: Yes     Types: Marijuana     Comment: uses twice weekly    • Sexual activity: Yes     Partners: Male     Birth control/protection: Condom   Social History Narrative    Last Pelvic/PAP 2016         ALLERGIES  Acetaminophen        REVIEW OF SYSTEMS  Review of Systems     All systems reviewed and negative except for those discussed in HPI.       PHYSICAL EXAM    I have reviewed the triage vital signs and nursing notes.    ED Triage Vitals   Temp Heart Rate Resp BP SpO2   07/15/20 0026 07/15/20 0026 07/15/20 0026 07/15/20 0039 07/15/20 0026   96.9 °F (36.1 °C) 103 20 (!) 169/101 99 %      Temp src Heart Rate  Source Patient Position BP Location FiO2 (%)   07/15/20 0026 -- 07/15/20 0039 07/15/20 0039 --   Tympanic  Lying Left arm        Physical Exam  GENERAL: WDWN female in no acute distress  HENT: nares patent, mucous membranes moist  EYES: no scleral icterus, conjunctiva not pale in appearance  CV: regular rhythm, regular rate, heart sounds normal  RESPIRATORY: normal effort, lungs CTA B  ABDOMEN: TTP epigastric region without guarding without rebound  MUSCULOSKELETAL: no deformity  NEURO: alert, moves all extremities, follows commands, face symmetrical, speech normal, no focal neuro deficits  SKIN: warm, dry, no obvious skin rash to the area of concern        LAB RESULTS  Recent Results (from the past 24 hour(s))   Comprehensive Metabolic Panel    Collection Time: 07/15/20 12:35 AM   Result Value Ref Range    Glucose 96 65 - 99 mg/dL    BUN 9 6 - 20 mg/dL    Creatinine 0.74 0.57 - 1.00 mg/dL    Sodium 142 136 - 145 mmol/L    Potassium 3.5 3.5 - 5.2 mmol/L    Chloride 101 98 - 107 mmol/L    CO2 23.9 22.0 - 29.0 mmol/L    Calcium 10.0 8.6 - 10.5 mg/dL    Total Protein 7.8 6.0 - 8.5 g/dL    Albumin 4.60 3.50 - 5.20 g/dL    ALT (SGPT) 14 1 - 33 U/L    AST (SGOT) 14 1 - 32 U/L    Alkaline Phosphatase 69 39 - 117 U/L    Total Bilirubin 0.2 0.0 - 1.2 mg/dL    eGFR Non African Amer 97 >60 mL/min/1.73    Globulin 3.2 gm/dL    A/G Ratio 1.4 g/dL    BUN/Creatinine Ratio 12.2 7.0 - 25.0    Anion Gap 17.1 (H) 5.0 - 15.0 mmol/L   Lipase    Collection Time: 07/15/20 12:35 AM   Result Value Ref Range    Lipase 29 13 - 60 U/L   hCG, Serum, Qualitative    Collection Time: 07/15/20 12:35 AM   Result Value Ref Range    HCG Qualitative Negative Negative   Light Blue Top    Collection Time: 07/15/20 12:35 AM   Result Value Ref Range    Extra Tube hold for add-on    Green Top (Gel)    Collection Time: 07/15/20 12:35 AM   Result Value Ref Range    Extra Tube Hold for add-ons.    Lavender Top    Collection Time: 07/15/20 12:35 AM   Result  Value Ref Range    Extra Tube hold for add-on    CBC Auto Differential    Collection Time: 07/15/20 12:35 AM   Result Value Ref Range    WBC 9.78 3.40 - 10.80 10*3/mm3    RBC 4.86 3.77 - 5.28 10*6/mm3    Hemoglobin 10.7 (L) 12.0 - 15.9 g/dL    Hematocrit 35.4 34.0 - 46.6 %    MCV 72.8 (L) 79.0 - 97.0 fL    MCH 22.0 (L) 26.6 - 33.0 pg    MCHC 30.2 (L) 31.5 - 35.7 g/dL    RDW 16.8 (H) 12.3 - 15.4 %    RDW-SD 43.0 37.0 - 54.0 fl    MPV 10.5 6.0 - 12.0 fL    Platelets 518 (H) 140 - 450 10*3/mm3    Neutrophil % 45.3 42.7 - 76.0 %    Lymphocyte % 43.8 19.6 - 45.3 %    Monocyte % 7.9 5.0 - 12.0 %    Eosinophil % 2.1 0.3 - 6.2 %    Basophil % 0.7 0.0 - 1.5 %    Neutrophils, Absolute 4.43 1.70 - 7.00 10*3/mm3    Lymphocytes, Absolute 4.28 (H) 0.70 - 3.10 10*3/mm3    Monocytes, Absolute 0.77 0.10 - 0.90 10*3/mm3    Eosinophils, Absolute 0.21 0.00 - 0.40 10*3/mm3    Basophils, Absolute 0.07 0.00 - 0.20 10*3/mm3   Urinalysis With Microscopic If Indicated (No Culture) - Urine, Clean Catch    Collection Time: 07/15/20  4:11 AM   Result Value Ref Range    Color, UA Yellow Yellow, Straw    Appearance, UA Turbid (A) Clear    pH, UA >=9.0 (H) 5.0 - 8.0    Specific Gravity, UA 1.023 1.005 - 1.030    Glucose, UA Negative Negative    Ketones, UA 15 mg/dL (1+) (A) Negative    Bilirubin, UA Negative Negative    Blood, UA Negative Negative    Protein, UA Trace (A) Negative    Leuk Esterase, UA Trace (A) Negative    Nitrite, UA Negative Negative    Urobilinogen, UA 0.2 E.U./dL 0.2 - 1.0 E.U./dL   Urinalysis, Microscopic Only - Urine, Clean Catch    Collection Time: 07/15/20  4:11 AM   Result Value Ref Range    RBC, UA None Seen None Seen, 0-2 /HPF    WBC, UA 0-2 None Seen, 0-2 /HPF    Bacteria, UA 3+ (A) None Seen /HPF    Squamous Epithelial Cells, UA 7-12 (A) None Seen, 0-2 /HPF    Hyaline Casts, UA 0-2 None Seen /LPF    Amorphous Crystals, UA Large/3+ None Seen /HPF    Methodology Manual Light Microscopy        Ordered the above labs and  independently reviewed the results.        RADIOLOGY  Us Gallbladder    Result Date: 7/15/2020  GALLBLADDER ULTRASOUND  HISTORY: Right upper quadrant pain  COMPARISON: None available.  TECHNIQUE: Grayscale and color Doppler sonographic images were obtained from the right upper quadrant.  FINDINGS: The visualized portions of the pancreas appear unremarkable. Grayscale images of the inferior vena cava are normal. There is no intra or extrahepatic biliary dilatation. Common bile duct measures up to 4 mm in diameter. Main portal vein is patent, with hepatopedal flow. Normal phasicity is seen in the hepatic veins. No stones or sludge are seen within the gallbladder, and there is no gallbladder wall thickening or pericholecystic fluid. Gallbladder wall measures 2 mm in thickness. Right kidney measures 11.0 x 4.8 0.9 cm. There is no hydronephrosis. Renal echotexture appears normal.      Normal right upper quadrant ultrasound.  This report was finalized on 7/15/2020 4:50 AM by Dr. Ysabel Renee M.D.        I ordered the above noted radiological studies. Reviewed by me and discussed with radiologist.  See dictation for official radiology interpretation.      PROCEDURES    Procedures      MEDICATIONS GIVEN IN ER    Medications   ondansetron (ZOFRAN) injection 4 mg (4 mg Intravenous Given 7/15/20 0045)   sodium chloride 0.9 % bolus 1,000 mL (0 mL Intravenous Stopped 7/15/20 0252)   promethazine (PHENERGAN) injection 12.5 mg (12.5 mg Intravenous Given 7/15/20 0152)   HYDROmorphone (DILAUDID) injection 0.5 mg (0.5 mg Intravenous Given 7/15/20 0203)   promethazine (PHENERGAN) injection 12.5 mg (12.5 mg Intravenous Given 7/15/20 0203)   aluminum-magnesium hydroxide-simethicone (MAALOX MAX) 400-400-40 MG/5ML suspension 15 mL (15 mL Oral Given 7/15/20 0251)   Lidocaine Viscous HCl (XYLOCAINE) 2 % mouth solution 15 mL (15 mL Mouth/Throat Given 7/15/20 0251)   HYDROmorphone (DILAUDID) injection 0.5 mg (0.5 mg Intravenous Given  7/15/20 0408)   haloperidol lactate (HALDOL) injection 2 mg (2 mg Intravenous Given 7/15/20 0416)         PROGRESS, DATA ANALYSIS, CONSULTS, AND MEDICAL DECISION MAKING    All labs have been independently reviewed by me.  All radiology studies have been reviewed by me and discussed with radiologist dictating the report.   EKG's independently viewed and interpreted by me.  Discussion below represents my analysis of pertinent findings related to patient's condition, differential diagnosis, treatment plan and final disposition.    DDX: Gastritis/peptic ulcer disease, pancreatitis, cystitis, biliary colic.    ED Course as of Jul 15 2008   Wed Jul 15, 2020   0357 WBC: 9.78 [RC]   0357 Hemoglobin(!): 10.7 [RC]   0357 Glucose: 96 [RC]   0357 BUN: 9 [RC]   0357 Creatinine: 0.74 [RC]   0358 HCG Qualitative: Negative [RC]      ED Course User Index  [RC] Vicente Alvarez III, PA     Patient was placed in face mask in first look. Patient was wearing facemask when I entered the room and throughout our encounter. I wore full protective equipment throughout this patient encounter including a face mask, and gloves. Hand hygiene was performed before donning protective equipment and after removal when leaving the room.    AS OF 20:08 VITALS:    BP - 127/72  HR - 76  TEMP - 96.9 °F (36.1 °C) (Tympanic)  O2 SATS - 98%        DIAGNOSIS  Final diagnoses:   Non-intractable vomiting with nausea, unspecified vomiting type   Epigastric pain         DISPOSITION  DISCHARGE    Patient discharged in stable condition.    Reviewed implications of results, diagnosis, meds, responsibility to follow up, warning signs and symptoms of possible worsening, potential complications and reasons to return to ER.    Patient/Family voiced understanding of above instructions.    Discussed plan for discharge, as there is no emergent indication for admission. Patient referred to primary care provider for BP management due to today's BP. Pt/family is  agreeable and understands need for follow up and repeat testing.  Pt is aware that discharge does not mean that nothing is wrong but it indicates no emergency is present that requires admission and they must continue care with follow-up as given below or physician of their choice.     FOLLOW-UP  Damon Jiang MD  [address] [id number] [phone]    Schedule an appointment as soon as possible for a visit   For further evaluation and treatment         Medication List      New Prescriptions    pantoprazole 40 MG EC tablet  Commonly known as:  PROTONIX  Take 1 tablet by mouth Daily.        Changed    promethazine 25 MG suppository  Commonly known as:  PHENERGAN  Insert 1 suppository into the rectum Every 6 (Six) Hours As Needed for   Nausea or Vomiting.  What changed:  when to take this                   Vicente Alvarez III, PA  07/15/20 2008

## 2020-07-15 NOTE — ED TRIAGE NOTES
"Pt to ER via PV from home. Pt c/o n/v and generalized abdominal pain. Pt states \"it feels like i'm being stabbed.\" Pt seen x2 days ago and was scheduled to follow up with a GI doctor. Pt diaphoretic and vomiting in triage.    Patient in mask. This RN in appropriate PPE - including mask, goggles, and gloves during all of patient care.     "

## 2020-07-15 NOTE — DISCHARGE INSTRUCTIONS
Continue with the Carafate as previously directed last night.  Stop the over-the-counter PPI and start 40 mg Protonix once a day for the next 21 days.  Call and follow-up with Dr. Jiang at the number above or the GI physician that was previously assigned to you in order to establish care and further evaluation  Return to the ER with any further concerns, should your condition change/worsen, or should you develop a fever.

## 2020-07-15 NOTE — ED PROVIDER NOTES
MD ATTESTATION NOTE    The MAURISIO and I have discussed this patient's history, physical exam, and treatment plan.  I have reviewed the documentation and personally had a face to face interaction with the patient. I affirm the documentation and agree with the treatment and plan.  The attached note describes my personal findings.    Patient presents with upper abdominal pain and vomiting.  This is the third time she is been here in the last several days and does not seem to be getting better.  She is been medicated on the outpatient basis and has not been able to follow with GI yet.  She is had labs and CT which have been unremarkable recently, and this is just more of the same.    She is retching rather violently, and looks very uncomfortable.  Her abdomen is benign however.  Her labs are unremarkable, she was treated with several rounds of medications and finally some Haldol seemed to do a lot with good for.  She was able to rest, and she woke up without nausea or vomiting and felt much better.  When I have her follow-up with GI and she can be discharged.     Héctor Gomez MD  07/15/20 0640

## 2020-07-16 ENCOUNTER — HOSPITAL ENCOUNTER (EMERGENCY)
Facility: HOSPITAL | Age: 25
Discharge: HOME | End: 2020-07-16
Attending: EMERGENCY MEDICINE | Admitting: EMERGENCY MEDICINE

## 2020-07-16 VITALS
BODY MASS INDEX: 38.19 KG/M2 | TEMPERATURE: 98 F | OXYGEN SATURATION: 99 % | SYSTOLIC BLOOD PRESSURE: 151 MMHG | HEART RATE: 92 BPM | DIASTOLIC BLOOD PRESSURE: 96 MMHG | WEIGHT: 223.7 LBS | HEIGHT: 64 IN | RESPIRATION RATE: 16 BRPM

## 2020-07-16 DIAGNOSIS — R10.9 CHRONIC ABDOMINAL PAIN: ICD-10-CM

## 2020-07-16 DIAGNOSIS — G89.29 CHRONIC ABDOMINAL PAIN: ICD-10-CM

## 2020-07-16 DIAGNOSIS — R11.2 NON-INTRACTABLE VOMITING WITH NAUSEA, UNSPECIFIED VOMITING TYPE: Primary | ICD-10-CM

## 2020-07-16 LAB
ALBUMIN SERPL-MCNC: 4.1 G/DL (ref 3.5–5.2)
ALBUMIN/GLOB SERPL: 1.4 G/DL
ALP SERPL-CCNC: 60 U/L (ref 39–117)
ALT SERPL W P-5'-P-CCNC: 10 U/L (ref 1–33)
ANION GAP SERPL CALCULATED.3IONS-SCNC: 13.3 MMOL/L (ref 5–15)
ANISOCYTOSIS BLD QL: ABNORMAL
AST SERPL-CCNC: 12 U/L (ref 1–32)
BILIRUB SERPL-MCNC: 0.4 MG/DL (ref 0–1.2)
BILIRUB UR QL STRIP: NEGATIVE
BUN SERPL-MCNC: 10 MG/DL (ref 6–20)
BUN/CREAT SERPL: 13.9 (ref 7–25)
CALCIUM SPEC-SCNC: 9.4 MG/DL (ref 8.6–10.5)
CHLORIDE SERPL-SCNC: 101 MMOL/L (ref 98–107)
CLARITY UR: ABNORMAL
CO2 SERPL-SCNC: 24.7 MMOL/L (ref 22–29)
COLOR UR: YELLOW
CREAT SERPL-MCNC: 0.72 MG/DL (ref 0.57–1)
DEPRECATED RDW RBC AUTO: 44.3 FL (ref 37–54)
ELLIPTOCYTES BLD QL SMEAR: ABNORMAL
ERYTHROCYTE [DISTWIDTH] IN BLOOD BY AUTOMATED COUNT: 16.6 % (ref 12.3–15.4)
GFR SERPL CREATININE-BSD FRML MDRD: 100 ML/MIN/1.73
GLOBULIN UR ELPH-MCNC: 3 GM/DL
GLUCOSE SERPL-MCNC: 93 MG/DL (ref 65–99)
GLUCOSE UR STRIP-MCNC: NEGATIVE MG/DL
HCG SERPL QL: NEGATIVE
HCT VFR BLD AUTO: 32.1 % (ref 34–46.6)
HGB BLD-MCNC: 9.7 G/DL (ref 12–15.9)
HGB UR QL STRIP.AUTO: NEGATIVE
HOLD SPECIMEN: NORMAL
HYPOCHROMIA BLD QL: ABNORMAL
KETONES UR QL STRIP: ABNORMAL
LEUKOCYTE ESTERASE UR QL STRIP.AUTO: NEGATIVE
LIPASE SERPL-CCNC: 56 U/L (ref 13–60)
LYMPHOCYTES # BLD MANUAL: 1.12 10*3/MM3 (ref 0.7–3.1)
LYMPHOCYTES NFR BLD MANUAL: 14.1 % (ref 19.6–45.3)
LYMPHOCYTES NFR BLD MANUAL: 7.1 % (ref 5–12)
MCH RBC QN AUTO: 22.2 PG (ref 26.6–33)
MCHC RBC AUTO-ENTMCNC: 30.2 G/DL (ref 31.5–35.7)
MCV RBC AUTO: 73.5 FL (ref 79–97)
MONOCYTES # BLD AUTO: 0.56 10*3/MM3 (ref 0.1–0.9)
NEUTROPHILS # BLD AUTO: 6.26 10*3/MM3 (ref 1.7–7)
NEUTROPHILS NFR BLD MANUAL: 78.8 % (ref 42.7–76)
NITRITE UR QL STRIP: NEGATIVE
OVALOCYTES BLD QL SMEAR: ABNORMAL
PH UR STRIP.AUTO: 8.5 [PH] (ref 5–8)
PLAT MORPH BLD: NORMAL
PLATELET # BLD AUTO: 347 10*3/MM3 (ref 140–450)
PMV BLD AUTO: 10.7 FL (ref 6–12)
POIKILOCYTOSIS BLD QL SMEAR: ABNORMAL
POLYCHROMASIA BLD QL SMEAR: ABNORMAL
POTASSIUM SERPL-SCNC: 3 MMOL/L (ref 3.5–5.2)
PROT SERPL-MCNC: 7.1 G/DL (ref 6–8.5)
PROT UR QL STRIP: NEGATIVE
RBC # BLD AUTO: 4.37 10*6/MM3 (ref 3.77–5.28)
SODIUM SERPL-SCNC: 139 MMOL/L (ref 136–145)
SP GR UR STRIP: 1.02 (ref 1–1.03)
UROBILINOGEN UR QL STRIP: ABNORMAL
WBC # BLD AUTO: 7.94 10*3/MM3 (ref 3.4–10.8)
WBC MORPH BLD: NORMAL
WHOLE BLOOD HOLD SPECIMEN: NORMAL
WHOLE BLOOD HOLD SPECIMEN: NORMAL

## 2020-07-16 PROCEDURE — 96375 TX/PRO/DX INJ NEW DRUG ADDON: CPT

## 2020-07-16 PROCEDURE — 25010000002 HALOPERIDOL LACTATE PER 5 MG: Performed by: NURSE PRACTITIONER

## 2020-07-16 PROCEDURE — 85025 COMPLETE CBC W/AUTO DIFF WBC: CPT | Performed by: EMERGENCY MEDICINE

## 2020-07-16 PROCEDURE — 85007 BL SMEAR W/DIFF WBC COUNT: CPT | Performed by: EMERGENCY MEDICINE

## 2020-07-16 PROCEDURE — 99284 EMERGENCY DEPT VISIT MOD MDM: CPT

## 2020-07-16 PROCEDURE — 83690 ASSAY OF LIPASE: CPT

## 2020-07-16 PROCEDURE — 25010000002 KETOROLAC TROMETHAMINE PER 15 MG: Performed by: NURSE PRACTITIONER

## 2020-07-16 PROCEDURE — 25010000002 MORPHINE PER 10 MG: Performed by: NURSE PRACTITIONER

## 2020-07-16 PROCEDURE — 81003 URINALYSIS AUTO W/O SCOPE: CPT

## 2020-07-16 PROCEDURE — 25010000002 PROMETHAZINE PER 50 MG: Performed by: NURSE PRACTITIONER

## 2020-07-16 PROCEDURE — 84703 CHORIONIC GONADOTROPIN ASSAY: CPT

## 2020-07-16 PROCEDURE — 96374 THER/PROPH/DIAG INJ IV PUSH: CPT

## 2020-07-16 PROCEDURE — 80053 COMPREHEN METABOLIC PANEL: CPT

## 2020-07-16 RX ORDER — HALOPERIDOL 5 MG/ML
2 INJECTION INTRAMUSCULAR ONCE
Status: COMPLETED | OUTPATIENT
Start: 2020-07-16 | End: 2020-07-16

## 2020-07-16 RX ORDER — PROMETHAZINE HYDROCHLORIDE 25 MG/ML
12.5 INJECTION, SOLUTION INTRAMUSCULAR; INTRAVENOUS ONCE
Status: COMPLETED | OUTPATIENT
Start: 2020-07-16 | End: 2020-07-16

## 2020-07-16 RX ORDER — KETOROLAC TROMETHAMINE 15 MG/ML
15 INJECTION, SOLUTION INTRAMUSCULAR; INTRAVENOUS ONCE
Status: COMPLETED | OUTPATIENT
Start: 2020-07-16 | End: 2020-07-16

## 2020-07-16 RX ORDER — MORPHINE SULFATE 2 MG/ML
4 INJECTION, SOLUTION INTRAMUSCULAR; INTRAVENOUS ONCE
Status: COMPLETED | OUTPATIENT
Start: 2020-07-16 | End: 2020-07-16

## 2020-07-16 RX ORDER — SODIUM CHLORIDE 0.9 % (FLUSH) 0.9 %
10 SYRINGE (ML) INJECTION AS NEEDED
Status: DISCONTINUED | OUTPATIENT
Start: 2020-07-16 | End: 2020-07-16 | Stop reason: HOSPADM

## 2020-07-16 RX ORDER — PROMETHAZINE HYDROCHLORIDE 25 MG/1
25 TABLET ORAL EVERY 6 HOURS PRN
Qty: 12 TABLET | Refills: 0 | Status: SHIPPED | OUTPATIENT
Start: 2020-07-16 | End: 2020-10-22

## 2020-07-16 RX ADMIN — KETOROLAC TROMETHAMINE 15 MG: 15 INJECTION, SOLUTION INTRAMUSCULAR; INTRAVENOUS at 08:04

## 2020-07-16 RX ADMIN — HALOPERIDOL LACTATE 2 MG: 5 INJECTION, SOLUTION INTRAMUSCULAR at 07:06

## 2020-07-16 RX ADMIN — PROMETHAZINE HYDROCHLORIDE 12.5 MG: 25 INJECTION INTRAMUSCULAR; INTRAVENOUS at 07:57

## 2020-07-16 RX ADMIN — SODIUM CHLORIDE 1000 ML: 9 INJECTION, SOLUTION INTRAVENOUS at 08:04

## 2020-07-16 RX ADMIN — MORPHINE SULFATE 4 MG: 2 INJECTION, SOLUTION INTRAMUSCULAR; INTRAVENOUS at 10:17

## 2020-07-16 NOTE — ED PROVIDER NOTES
EMERGENCY DEPARTMENT ENCOUNTER    Room Number:  03/03  Date of encounter:  7/16/2020  PCP: Arian Barnard APRN  Historian: Patient      HPI:  Chief Complaint: Abdominal pain  A complete HPI/ROS/PMH/PSH/SH/FH are unobtainable due to: Nothing    Context: Alicia Powell is a 24 y.o. female who arrives to the ED via private vehicle from home with her significant other.  Patient presents with c/o constant, sharp, moderate abdominal pain that began 3 to 4 days ago.   Patient also complains of nausea and vomiting.  Patient denies fever, chills, dysuria, chest pain or shortness of breath.  Patient states that nothing makes the symptoms better and nothing worsens symptoms.  Patient states that she has been able to keep anything down for the past 3 to 4 days.  She has been seen here on 3 separate occasions, is to follow-up with GI but has not been able to make a follow-up appointment.        PAST MEDICAL HISTORY  Active Ambulatory Problems     Diagnosis Date Noted   • PCOS (polycystic ovarian syndrome) 05/06/2016   • Hirsutism 05/06/2016   • Obesity 05/06/2016   • Nausea 07/13/2018   • Pain of upper abdomen 07/13/2018   • Non-intractable vomiting with nausea 07/13/2018     Resolved Ambulatory Problems     Diagnosis Date Noted   • Abnormal uterine bleeding (AUB) 05/06/2016   • ASCUS of cervix with negative high risk HPV 08/01/2018     Past Medical History:   Diagnosis Date   • Anemia    • Anxiety    • Asthma    • Depression    • Dizziness    • GERD (gastroesophageal reflux disease)    • H/O ETOH abuse    • Multiple URI    • Pneumonia    • Viral infection          PAST SURGICAL HISTORY  Past Surgical History:   Procedure Laterality Date   • DENTAL PROCEDURE     • ENDOSCOPY N/A 7/14/2018    Procedure: ESOPHAGOGASTRODUODENOSCOPY WITH COLD BIOPSIES;  Surgeon: Damon Morrison MD;  Location: [facility] ENDOSCOPY;  Service: Gastroenterology   • VAGINAL SEPTUM RESECTION  2012    Excision   • WISDOM TOOTH EXTRACTION  02/2016          FAMILY HISTORY  Family History   Problem Relation Age of Onset   • Anxiety disorder Mother    • Depression Mother    • CALDERON disease Mother    • Hypertension Maternal Grandmother    • Hyperlipidemia Maternal Grandmother    • Other Maternal Grandmother         GERD   • CALDERON disease Father    • Pancreatitis Father    • Heart disease Maternal Grandfather    • Heart disease Paternal Grandfather    • Breast cancer Neg Hx    • Ovarian cancer Neg Hx    • Uterine cancer Neg Hx    • Colon cancer Neg Hx          SOCIAL HISTORY  Social History     Socioeconomic History   • Marital status: Single     Spouse name: Not on file   • Number of children: 0   • Years of education: Not on file   • Highest education level: Not on file   Occupational History   • Occupation: unemployed   Tobacco Use   • Smoking status: Current Some Day Smoker     Types: Cigars   • Smokeless tobacco: Never Used   Substance and Sexual Activity   • Alcohol use: Yes     Frequency: 2-4 times a month     Drinks per session: 3 or 4     Comment: 3 to 4 drinks per month    • Drug use: Yes     Types: Marijuana     Comment: uses twice weekly    • Sexual activity: Yes     Partners: Male     Birth control/protection: Condom   Social History Narrative    Last Pelvic/PAP 2016         ALLERGIES  Acetaminophen        REVIEW OF SYSTEMS  Review of Systems     All systems reviewed and negative except for those discussed in HPI.        PHYSICAL EXAM    ED Triage Vitals   Temp Heart Rate Resp BP SpO2   07/16/20 0537 07/16/20 0537 07/16/20 0537 07/16/20 0606 07/16/20 0537   98 °F (36.7 °C) 84 20 (!) 171/106 100 %       Physical Exam  GENERAL: Well appearing, non-toxic appearing, mildly distressed  HENT: normocephalic, atraumatic  EYES: no scleral icterus, PERRL  CV: regular rhythm, regular rate, no murmur  RESPIRATORY: normal effort, CTAB  ABDOMEN: soft, normal bowel sounds, diffuse tenderness, no rebound, no guarding or rigidity  No CVA or flank  tenderness  MUSCULOSKELETAL: no deformity  NEURO: alert, moves all extremities, follows commands, mental status normal/baseline  SKIN: warm, dry, no rash   Psych: Patient continues to moan and cry out  Nursing notes and vital signs reviewed      LAB RESULTS  Recent Results (from the past 24 hour(s))   Lavender Top    Collection Time: 07/16/20  6:24 AM   Result Value Ref Range    Extra Tube hold for add-on    CBC Auto Differential    Collection Time: 07/16/20  6:24 AM   Result Value Ref Range    WBC 7.94 3.40 - 10.80 10*3/mm3    RBC 4.37 3.77 - 5.28 10*6/mm3    Hemoglobin 9.7 (L) 12.0 - 15.9 g/dL    Hematocrit 32.1 (L) 34.0 - 46.6 %    MCV 73.5 (L) 79.0 - 97.0 fL    MCH 22.2 (L) 26.6 - 33.0 pg    MCHC 30.2 (L) 31.5 - 35.7 g/dL    RDW 16.6 (H) 12.3 - 15.4 %    RDW-SD 44.3 37.0 - 54.0 fl    MPV 10.7 6.0 - 12.0 fL    Platelets 347 140 - 450 10*3/mm3   Manual Differential    Collection Time: 07/16/20  6:24 AM   Result Value Ref Range    Neutrophil % 78.8 (H) 42.7 - 76.0 %    Lymphocyte % 14.1 (L) 19.6 - 45.3 %    Monocyte % 7.1 5.0 - 12.0 %    Neutrophils Absolute 6.26 1.70 - 7.00 10*3/mm3    Lymphocytes Absolute 1.12 0.70 - 3.10 10*3/mm3    Monocytes Absolute 0.56 0.10 - 0.90 10*3/mm3    Anisocytosis Mod/2+ None Seen    Elliptocytes Slight/1+ None Seen    Hypochromia Mod/2+ None Seen    Ovalocytes Mod/2+ None Seen    Poikilocytes Large/3+ None Seen    Polychromasia Slight/1+ None Seen    WBC Morphology Normal Normal    Platelet Morphology Normal Normal   Comprehensive Metabolic Panel    Collection Time: 07/16/20  6:25 AM   Result Value Ref Range    Glucose 93 65 - 99 mg/dL    BUN 10 6 - 20 mg/dL    Creatinine 0.72 0.57 - 1.00 mg/dL    Sodium 139 136 - 145 mmol/L    Potassium 3.0 (L) 3.5 - 5.2 mmol/L    Chloride 101 98 - 107 mmol/L    CO2 24.7 22.0 - 29.0 mmol/L    Calcium 9.4 8.6 - 10.5 mg/dL    Total Protein 7.1 6.0 - 8.5 g/dL    Albumin 4.10 3.50 - 5.20 g/dL    ALT (SGPT) 10 1 - 33 U/L    AST (SGOT) 12 1 - 32 U/L     Alkaline Phosphatase 60 39 - 117 U/L    Total Bilirubin 0.4 0.0 - 1.2 mg/dL    eGFR Non African Amer 100 >60 mL/min/1.73    Globulin 3.0 gm/dL    A/G Ratio 1.4 g/dL    BUN/Creatinine Ratio 13.9 7.0 - 25.0    Anion Gap 13.3 5.0 - 15.0 mmol/L   Lipase    Collection Time: 07/16/20  6:25 AM   Result Value Ref Range    Lipase 56 13 - 60 U/L   hCG, Serum, Qualitative    Collection Time: 07/16/20  6:25 AM   Result Value Ref Range    HCG Qualitative Negative Negative   Light Blue Top    Collection Time: 07/16/20  6:25 AM   Result Value Ref Range    Extra Tube hold for add-on    Green Top (Gel)    Collection Time: 07/16/20  6:25 AM   Result Value Ref Range    Extra Tube Hold for add-ons.    Urinalysis With Microscopic If Indicated (No Culture) - Urine, Clean Catch    Collection Time: 07/16/20  7:08 AM   Result Value Ref Range    Color, UA Yellow Yellow, Straw    Appearance, UA Turbid (A) Clear    pH, UA 8.5 (H) 5.0 - 8.0    Specific Gravity, UA 1.019 1.005 - 1.030    Glucose, UA Negative Negative    Ketones, UA 40 mg/dL (2+) (A) Negative    Bilirubin, UA Negative Negative    Blood, UA Negative Negative    Protein, UA Negative Negative    Leuk Esterase, UA Negative Negative    Nitrite, UA Negative Negative    Urobilinogen, UA 0.2 E.U./dL 0.2 - 1.0 E.U./dL       Ordered the above labs and independently reviewed the results.        MEDICAL RECORD REVIEW  Medical records reviewed in [facility], patient has been in this ER May 29, July 13, July 15 all for the same symptoms.  Patient had a gallbladder ultrasound done July 15 that was normal  Patient had a CT abdomen and pelvis done May 29 that was unremarkable other than a 3.8 cm left ovarian cyst    PROCEDURES    Procedures        DIFFERENTIAL DIAGNOSIS  Differential diagnosis for abdominal pain include but are not limited to the following:    -Hepatobiliary pathology such as cholecystitis, cholangitis, and symptomatic cholelithiasis  -PUD  -Mesenteric  ischemia  -Pancreatitis  -Dyspepsia  -Small or large bowel obstruction  -Appendicitis  -Diverticulitis  -UTI including pyelonephritis      PROGRESS, DATA ANALYSIS, CONSULTS, AND MEDICAL DECISION MAKING    PPE: Patient was placed in face mask in first look. Patient was wearing facemask when I entered the room and throughout our encounter. I wore full protective equipment throughout this patient encounter including a face mask, and gloves. Hand hygiene was performed before donning protective equipment and after removal when leaving the room.        ED Course as of Jul 16 1006   Thu Jul 16, 2020   3262 Discussed pertinent information from history and physical exam with patient.  Discussed differential diagnosis and plan for ED evaluation/work-up and treatment including labs, fluids.  All questions answered.  Patient is agreeable with this plan.  Patient just received 2 mg of Haldol, stating it is not working states she feels like she needs the Dilaudid to help with the pain.  Instructed patient that we would give the Haldol time to work that she just could not continue to come to the ER to get IV Dilaudid.  Will reassess her pain and treat appropriately.      [MS]   0742 Ketones, UA(!): 40 mg/dL (2+) [MS]   0758 Reviewed pt's history and workup with Dr. Will.  After a bedside evaluation, they agree with the plan of care.          [MS]      ED Course User Index  [MS] Carol Lennon, APRN     19730: I informed the patient of the results of the testing done here in the ER today.  Patient's repeat exam, test results and history are not concerning for serious etiology of their abdominal pain.  I explained to the patient the initial treatment plan and I instructed the patient to return to the emergency department if fever develops, worsening of pain, not able to tolerate liquids or worsening of symptoms.  Instructed the patient to follow-up with her physician or specialist for further evaluation in 2 to 3 days.   The patient understands and agrees with the treatment plan.  Patient was advised to follow-up with GI, she should call today to schedule an appointment.  She should pick her prescription of Carafate up at the pharmacy.  Discussed plan for discharge, as there is no emergent indication for admission. Pt/family is agreeable and understands need for follow up and repeat testing.  Pt is aware that discharge does not mean that nothing is wrong but it indicates no emergency is present that requires admission and they must continue care with follow-up as given below or physician of their choice.   Patient/Family voiced understanding of above instructions.  Patient discharged in stable condition.    DIAGNOSIS  Final diagnoses:   Non-intractable vomiting with nausea, unspecified vomiting type   Chronic abdominal pain       FOLLOW UP   Arian Barnard, APRN  [address]  [facility] [id number] [phone]    Call today      Adrianne Bautista MD  [address] [facility] [address]  [facility] [id number] [phone]            RX    promethazine 25 MG tablet  Commonly known as:  PHENERGAN  Take 1 tablet by mouth Every 6 (Six) Hours As Needed for Nausea or   Vomiting.  What changed:  You were already taking a medication with the same name,   and this prescription was added. Make sure you understand how and when to   take each.         MEDICATIONS GIVEN IN ED    Medications   sodium chloride 0.9 % flush 10 mL (has no administration in time range)   morphine injection 4 mg (has no administration in time range)   haloperidol lactate (HALDOL) injection 2 mg (2 mg Intravenous Given 7/16/20 0706)   promethazine (PHENERGAN) injection 12.5 mg (12.5 mg Intravenous Given 7/16/20 0757)   ketorolac (TORADOL) injection 15 mg (15 mg Intravenous Given 7/16/20 0804)   sodium chloride 0.9 % bolus 1,000 mL (1,000 mL Intravenous New Bag 7/16/20 0804)           COURSE & MEDICAL DECISION MAKING  Any/All labs and Any/All Imaging studies that were  ordered were reviewed and are noted above.  Results were reviewed/discussed with the patient and they were also made aware of online assess.   Pt also made aware that some labs, such as cultures, will not be resulted during ER visit and follow up with PMD is necessary.        Carol Lennon, APRN  07/16/20 1009

## 2020-07-16 NOTE — DISCHARGE INSTRUCTIONS
You have been evaluated in the Emergency Department today for abdominal pain. Your evaluation was not suggestive of any emergent condition requiring medical intervention at this time. However, some abdominal problems make take more time to appear.  Medications as ordered  Diet as tolerated  Please follow up with your primary care physician or Clinic as needed, call to schedule appointment  Return to the Emergency Department if you experience worsening pain, persistent fevers greater than 100.4, recurrent vomiting, blood in vomit, blood in stool, dark tarry stool, chest pain, difficulty breathing, or any other concerning symptoms.

## 2020-07-16 NOTE — ED PROVIDER NOTES
I have supervised the care provided by the midlevel provider.    We have discussed this patient's history, physical exam, and treatment plan.   I have reviewed the note and have personally examined the patient and agree with the plan of care.  See attached attending note.  My personal findings are below:    Patient complains of epigastric pain and nausea and vomiting for the past 3 to 4 days.  She denies fever shortness of breath.  Patient has been seen here several times recently.    On exam: Awake, alert.  Mucous membranes are moist.  Heart is regular.  Lungs are clear there is mild epigastric tenderness rebound or guarding.  No CVA tenderness.    I have reviewed the patient's labs.  She does have ketones in her urine.  Patient was given IV fluids and IV medications.     Micah Will MD  07/16/20 2980

## 2020-07-16 NOTE — ED TRIAGE NOTES
Pt to ER via PV with c/o abd pain and n/v x3 days. Pt states she was seen here recently (7/15/2020) for same. Pt actively vomiting in triage, states 10/10 pain.     Pt masked in triage, staff in appropriate ppe.

## 2020-07-17 ENCOUNTER — HOSPITAL ENCOUNTER (OUTPATIENT)
Facility: HOSPITAL | Age: 25
Setting detail: OBSERVATION
Discharge: HOME | End: 2020-07-20
Attending: EMERGENCY MEDICINE | Admitting: HOSPITALIST

## 2020-07-17 DIAGNOSIS — R10.13 EPIGASTRIC PAIN: ICD-10-CM

## 2020-07-17 DIAGNOSIS — E86.0 DEHYDRATION: ICD-10-CM

## 2020-07-17 DIAGNOSIS — R11.2 INTRACTABLE VOMITING WITH NAUSEA, UNSPECIFIED VOMITING TYPE: Primary | ICD-10-CM

## 2020-07-17 PROBLEM — F41.9 ANXIETY: Status: ACTIVE | Noted: 2020-07-17

## 2020-07-17 PROBLEM — K21.9 GERD (GASTROESOPHAGEAL REFLUX DISEASE): Status: ACTIVE | Noted: 2020-07-17

## 2020-07-17 PROBLEM — Z72.0 TOBACCO ABUSE: Status: ACTIVE | Noted: 2020-07-17

## 2020-07-17 PROBLEM — R11.10 INTRACTABLE VOMITING: Status: ACTIVE | Noted: 2020-07-17

## 2020-07-17 PROBLEM — E66.9 OBESITY (BMI 30-39.9): Status: ACTIVE | Noted: 2020-07-17

## 2020-07-17 PROBLEM — J45.909 ASTHMA: Status: ACTIVE | Noted: 2020-07-17

## 2020-07-17 PROBLEM — E87.5 HYPERKALEMIA: Status: ACTIVE | Noted: 2020-07-17

## 2020-07-17 LAB
ALBUMIN SERPL-MCNC: 5 G/DL (ref 3.5–5.2)
ALBUMIN/GLOB SERPL: 1.7 G/DL
ALP SERPL-CCNC: 67 U/L (ref 39–117)
ALT SERPL W P-5'-P-CCNC: 14 U/L (ref 1–33)
ANION GAP SERPL CALCULATED.3IONS-SCNC: 13.8 MMOL/L (ref 5–15)
AST SERPL-CCNC: 17 U/L (ref 1–32)
BACTERIA UR QL AUTO: ABNORMAL /HPF
BASOPHILS # BLD AUTO: 0.03 10*3/MM3 (ref 0–0.2)
BASOPHILS NFR BLD AUTO: 0.3 % (ref 0–1.5)
BILIRUB SERPL-MCNC: 0.5 MG/DL (ref 0–1.2)
BILIRUB UR QL STRIP: NEGATIVE
BUN SERPL-MCNC: 10 MG/DL (ref 6–20)
BUN/CREAT SERPL: 12 (ref 7–25)
CALCIUM SPEC-SCNC: 9.6 MG/DL (ref 8.6–10.5)
CHLORIDE SERPL-SCNC: 105 MMOL/L (ref 98–107)
CLARITY UR: ABNORMAL
CO2 SERPL-SCNC: 24.2 MMOL/L (ref 22–29)
COLOR UR: YELLOW
CREAT SERPL-MCNC: 0.83 MG/DL (ref 0.57–1)
DEPRECATED RDW RBC AUTO: 43.5 FL (ref 37–54)
EOSINOPHIL # BLD AUTO: 0 10*3/MM3 (ref 0–0.4)
EOSINOPHIL NFR BLD AUTO: 0 % (ref 0.3–6.2)
ERYTHROCYTE [DISTWIDTH] IN BLOOD BY AUTOMATED COUNT: 16.9 % (ref 12.3–15.4)
GFR SERPL CREATININE-BSD FRML MDRD: 85 ML/MIN/1.73
GLOBULIN UR ELPH-MCNC: 3 GM/DL
GLUCOSE SERPL-MCNC: 116 MG/DL (ref 65–99)
GLUCOSE UR STRIP-MCNC: NEGATIVE MG/DL
HCG SERPL QL: NEGATIVE
HCT VFR BLD AUTO: 34.6 % (ref 34–46.6)
HGB BLD-MCNC: 10.5 G/DL (ref 12–15.9)
HGB UR QL STRIP.AUTO: NEGATIVE
HYALINE CASTS UR QL AUTO: ABNORMAL /LPF
KETONES UR QL STRIP: ABNORMAL
LEUKOCYTE ESTERASE UR QL STRIP.AUTO: NEGATIVE
LIPASE SERPL-CCNC: 27 U/L (ref 13–60)
LYMPHOCYTES # BLD AUTO: 0.9 10*3/MM3 (ref 0.7–3.1)
LYMPHOCYTES NFR BLD AUTO: 8.8 % (ref 19.6–45.3)
MCH RBC QN AUTO: 22.2 PG (ref 26.6–33)
MCHC RBC AUTO-ENTMCNC: 30.3 G/DL (ref 31.5–35.7)
MCV RBC AUTO: 73.2 FL (ref 79–97)
MONOCYTES # BLD AUTO: 0.33 10*3/MM3 (ref 0.1–0.9)
MONOCYTES NFR BLD AUTO: 3.2 % (ref 5–12)
NEUTROPHILS NFR BLD AUTO: 8.94 10*3/MM3 (ref 1.7–7)
NEUTROPHILS NFR BLD AUTO: 87.4 % (ref 42.7–76)
NITRITE UR QL STRIP: NEGATIVE
PH UR STRIP.AUTO: 7.5 [PH] (ref 5–8)
PLATELET # BLD AUTO: 456 10*3/MM3 (ref 140–450)
PMV BLD AUTO: 10.2 FL (ref 6–12)
POTASSIUM SERPL-SCNC: 3.3 MMOL/L (ref 3.5–5.2)
PROT SERPL-MCNC: 8 G/DL (ref 6–8.5)
PROT UR QL STRIP: ABNORMAL
RBC # BLD AUTO: 4.73 10*6/MM3 (ref 3.77–5.28)
RBC # UR: ABNORMAL /HPF
REF LAB TEST METHOD: ABNORMAL
SODIUM SERPL-SCNC: 143 MMOL/L (ref 136–145)
SP GR UR STRIP: >=1.03 (ref 1–1.03)
SQUAMOUS #/AREA URNS HPF: ABNORMAL /HPF
UROBILINOGEN UR QL STRIP: ABNORMAL
WBC # BLD AUTO: 10.23 10*3/MM3 (ref 3.4–10.8)
WBC UR QL AUTO: ABNORMAL /HPF
WHOLE BLOOD HOLD SPECIMEN: NORMAL

## 2020-07-17 PROCEDURE — 83690 ASSAY OF LIPASE: CPT | Performed by: EMERGENCY MEDICINE

## 2020-07-17 PROCEDURE — 81001 URINALYSIS AUTO W/SCOPE: CPT | Performed by: EMERGENCY MEDICINE

## 2020-07-17 PROCEDURE — 25010000002 METOCLOPRAMIDE PER 10 MG: Performed by: EMERGENCY MEDICINE

## 2020-07-17 PROCEDURE — 84703 CHORIONIC GONADOTROPIN ASSAY: CPT | Performed by: EMERGENCY MEDICINE

## 2020-07-17 PROCEDURE — 25010000002 HYDROMORPHONE PER 4 MG: Performed by: EMERGENCY MEDICINE

## 2020-07-17 PROCEDURE — 96375 TX/PRO/DX INJ NEW DRUG ADDON: CPT

## 2020-07-17 PROCEDURE — 36415 COLL VENOUS BLD VENIPUNCTURE: CPT

## 2020-07-17 PROCEDURE — G0378 HOSPITAL OBSERVATION PER HR: HCPCS

## 2020-07-17 PROCEDURE — 85025 COMPLETE CBC W/AUTO DIFF WBC: CPT | Performed by: EMERGENCY MEDICINE

## 2020-07-17 PROCEDURE — 80053 COMPREHEN METABOLIC PANEL: CPT | Performed by: EMERGENCY MEDICINE

## 2020-07-17 PROCEDURE — 96374 THER/PROPH/DIAG INJ IV PUSH: CPT

## 2020-07-17 PROCEDURE — 99283 EMERGENCY DEPT VISIT LOW MDM: CPT

## 2020-07-17 RX ORDER — ACETAMINOPHEN 325 MG/1
650 TABLET ORAL EVERY 4 HOURS PRN
Status: DISCONTINUED | OUTPATIENT
Start: 2020-07-17 | End: 2020-07-19

## 2020-07-17 RX ORDER — METOCLOPRAMIDE HYDROCHLORIDE 5 MG/ML
10 INJECTION INTRAMUSCULAR; INTRAVENOUS ONCE
Status: COMPLETED | OUTPATIENT
Start: 2020-07-17 | End: 2020-07-17

## 2020-07-17 RX ORDER — ONDANSETRON 2 MG/ML
4 INJECTION INTRAMUSCULAR; INTRAVENOUS EVERY 6 HOURS PRN
Status: DISCONTINUED | OUTPATIENT
Start: 2020-07-17 | End: 2020-07-20 | Stop reason: HOSPADM

## 2020-07-17 RX ORDER — SODIUM CHLORIDE 0.9 % (FLUSH) 0.9 %
10 SYRINGE (ML) INJECTION AS NEEDED
Status: DISCONTINUED | OUTPATIENT
Start: 2020-07-17 | End: 2020-07-20 | Stop reason: HOSPADM

## 2020-07-17 RX ORDER — ALUMINA, MAGNESIA, AND SIMETHICONE 2400; 2400; 240 MG/30ML; MG/30ML; MG/30ML
15 SUSPENSION ORAL EVERY 6 HOURS PRN
Status: DISCONTINUED | OUTPATIENT
Start: 2020-07-17 | End: 2020-07-20 | Stop reason: HOSPADM

## 2020-07-17 RX ORDER — FAMOTIDINE 10 MG/ML
20 INJECTION, SOLUTION INTRAVENOUS ONCE
Status: COMPLETED | OUTPATIENT
Start: 2020-07-17 | End: 2020-07-17

## 2020-07-17 RX ORDER — BISACODYL 5 MG/1
5 TABLET, DELAYED RELEASE ORAL DAILY PRN
Status: DISCONTINUED | OUTPATIENT
Start: 2020-07-17 | End: 2020-07-20 | Stop reason: HOSPADM

## 2020-07-17 RX ORDER — HYDROMORPHONE HYDROCHLORIDE 1 MG/ML
0.5 INJECTION, SOLUTION INTRAMUSCULAR; INTRAVENOUS; SUBCUTANEOUS ONCE
Status: COMPLETED | OUTPATIENT
Start: 2020-07-17 | End: 2020-07-17

## 2020-07-17 RX ORDER — ONDANSETRON 4 MG/1
4 TABLET, FILM COATED ORAL EVERY 6 HOURS PRN
Status: DISCONTINUED | OUTPATIENT
Start: 2020-07-17 | End: 2020-07-20 | Stop reason: HOSPADM

## 2020-07-17 RX ORDER — BISACODYL 10 MG
10 SUPPOSITORY, RECTAL RECTAL DAILY PRN
Status: DISCONTINUED | OUTPATIENT
Start: 2020-07-17 | End: 2020-07-20 | Stop reason: HOSPADM

## 2020-07-17 RX ADMIN — HYDROMORPHONE HYDROCHLORIDE 0.5 MG: 1 INJECTION, SOLUTION INTRAMUSCULAR; INTRAVENOUS; SUBCUTANEOUS at 20:10

## 2020-07-17 RX ADMIN — METOCLOPRAMIDE HYDROCHLORIDE 10 MG: 5 INJECTION INTRAMUSCULAR; INTRAVENOUS at 20:09

## 2020-07-17 RX ADMIN — FAMOTIDINE 20 MG: 10 INJECTION, SOLUTION INTRAVENOUS at 20:09

## 2020-07-17 NOTE — ED TRIAGE NOTES
Pt comes to ER for epigastric pain with nausea since the beginning of the week. Pt has been seen multiple times for the same and given multiple meds with no relief. Pt has appt with DR Morrison next week but doesn't think she can wait. Pt and RN wearing mask upon triage.     
(2) good, crying

## 2020-07-18 ENCOUNTER — APPOINTMENT (OUTPATIENT)
Dept: CT IMAGING | Facility: HOSPITAL | Age: 25
End: 2020-07-18

## 2020-07-18 PROBLEM — E87.6 HYPOKALEMIA: Status: ACTIVE | Noted: 2020-07-17

## 2020-07-18 LAB
ANION GAP SERPL CALCULATED.3IONS-SCNC: 9.7 MMOL/L (ref 5–15)
BUN SERPL-MCNC: 9 MG/DL (ref 6–20)
BUN/CREAT SERPL: 12.9 (ref 7–25)
CALCIUM SPEC-SCNC: 8.9 MG/DL (ref 8.6–10.5)
CHLORIDE SERPL-SCNC: 104 MMOL/L (ref 98–107)
CO2 SERPL-SCNC: 24.3 MMOL/L (ref 22–29)
CREAT SERPL-MCNC: 0.7 MG/DL (ref 0.57–1)
DEPRECATED RDW RBC AUTO: 41.8 FL (ref 37–54)
ERYTHROCYTE [DISTWIDTH] IN BLOOD BY AUTOMATED COUNT: 16.3 % (ref 12.3–15.4)
GFR SERPL CREATININE-BSD FRML MDRD: 104 ML/MIN/1.73
GLUCOSE SERPL-MCNC: 81 MG/DL (ref 65–99)
HCT VFR BLD AUTO: 29.7 % (ref 34–46.6)
HGB BLD-MCNC: 9 G/DL (ref 12–15.9)
MCH RBC QN AUTO: 21.9 PG (ref 26.6–33)
MCHC RBC AUTO-ENTMCNC: 30.3 G/DL (ref 31.5–35.7)
MCV RBC AUTO: 72.3 FL (ref 79–97)
PLATELET # BLD AUTO: 345 10*3/MM3 (ref 140–450)
PMV BLD AUTO: 10.8 FL (ref 6–12)
POTASSIUM SERPL-SCNC: 3.3 MMOL/L (ref 3.5–5.2)
RBC # BLD AUTO: 4.11 10*6/MM3 (ref 3.77–5.28)
SODIUM SERPL-SCNC: 138 MMOL/L (ref 136–145)
WBC # BLD AUTO: 9.06 10*3/MM3 (ref 3.4–10.8)

## 2020-07-18 PROCEDURE — 25010000002 ONDANSETRON PER 1 MG: Performed by: NURSE PRACTITIONER

## 2020-07-18 PROCEDURE — 25010000002 PROMETHAZINE PER 50 MG: Performed by: HOSPITALIST

## 2020-07-18 PROCEDURE — 80048 BASIC METABOLIC PNL TOTAL CA: CPT | Performed by: NURSE PRACTITIONER

## 2020-07-18 PROCEDURE — 74176 CT ABD & PELVIS W/O CONTRAST: CPT

## 2020-07-18 PROCEDURE — 96375 TX/PRO/DX INJ NEW DRUG ADDON: CPT

## 2020-07-18 PROCEDURE — 25010000002 MORPHINE PER 10 MG: Performed by: HOSPITALIST

## 2020-07-18 PROCEDURE — 25810000003 SODIUM CHLORIDE 0.9 % WITH KCL 20 MEQ 20-0.9 MEQ/L-% SOLUTION: Performed by: NURSE PRACTITIONER

## 2020-07-18 PROCEDURE — 93005 ELECTROCARDIOGRAM TRACING: CPT | Performed by: NURSE PRACTITIONER

## 2020-07-18 PROCEDURE — 96361 HYDRATE IV INFUSION ADD-ON: CPT

## 2020-07-18 PROCEDURE — 85027 COMPLETE CBC AUTOMATED: CPT | Performed by: NURSE PRACTITIONER

## 2020-07-18 PROCEDURE — G0378 HOSPITAL OBSERVATION PER HR: HCPCS

## 2020-07-18 PROCEDURE — 63710000001 ONDANSETRON PER 8 MG: Performed by: NURSE PRACTITIONER

## 2020-07-18 PROCEDURE — 99213 OFFICE O/P EST LOW 20 MIN: CPT | Performed by: INTERNAL MEDICINE

## 2020-07-18 RX ORDER — SODIUM CHLORIDE AND POTASSIUM CHLORIDE 150; 900 MG/100ML; MG/100ML
100 INJECTION, SOLUTION INTRAVENOUS CONTINUOUS
Status: DISCONTINUED | OUTPATIENT
Start: 2020-07-18 | End: 2020-07-20 | Stop reason: HOSPADM

## 2020-07-18 RX ORDER — MORPHINE SULFATE 2 MG/ML
2 INJECTION, SOLUTION INTRAMUSCULAR; INTRAVENOUS
Status: DISCONTINUED | OUTPATIENT
Start: 2020-07-18 | End: 2020-07-18

## 2020-07-18 RX ORDER — PROMETHAZINE HYDROCHLORIDE 25 MG/ML
12.5 INJECTION, SOLUTION INTRAMUSCULAR; INTRAVENOUS ONCE
Status: COMPLETED | OUTPATIENT
Start: 2020-07-18 | End: 2020-07-18

## 2020-07-18 RX ORDER — VENLAFAXINE HYDROCHLORIDE 75 MG/1
75 CAPSULE, EXTENDED RELEASE ORAL DAILY
Status: DISCONTINUED | OUTPATIENT
Start: 2020-07-18 | End: 2020-07-20 | Stop reason: HOSPADM

## 2020-07-18 RX ORDER — FAMOTIDINE 20 MG/1
20 TABLET, FILM COATED ORAL
Start: 2020-07-18 | End: 2020-10-22 | Stop reason: SDUPTHER

## 2020-07-18 RX ORDER — FAMOTIDINE 20 MG/1
20 TABLET, FILM COATED ORAL
Status: DISCONTINUED | OUTPATIENT
Start: 2020-07-18 | End: 2020-07-20 | Stop reason: HOSPADM

## 2020-07-18 RX ORDER — LORAZEPAM 2 MG/ML
0.5 INJECTION INTRAMUSCULAR ONCE
Status: DISCONTINUED | OUTPATIENT
Start: 2020-07-19 | End: 2020-07-20 | Stop reason: HOSPADM

## 2020-07-18 RX ORDER — SUCRALFATE 1 G/1
1 TABLET ORAL 4 TIMES DAILY
Status: DISCONTINUED | OUTPATIENT
Start: 2020-07-18 | End: 2020-07-18

## 2020-07-18 RX ORDER — PROMETHAZINE HYDROCHLORIDE 25 MG/1
25 SUPPOSITORY RECTAL EVERY 6 HOURS PRN
Status: DISCONTINUED | OUTPATIENT
Start: 2020-07-18 | End: 2020-07-20 | Stop reason: HOSPADM

## 2020-07-18 RX ORDER — FAMOTIDINE 10 MG/ML
20 INJECTION, SOLUTION INTRAVENOUS EVERY 12 HOURS SCHEDULED
Status: DISCONTINUED | OUTPATIENT
Start: 2020-07-19 | End: 2020-07-18

## 2020-07-18 RX ORDER — ALBUTEROL SULFATE 2.5 MG/3ML
2.5 SOLUTION RESPIRATORY (INHALATION) EVERY 6 HOURS PRN
Status: DISCONTINUED | OUTPATIENT
Start: 2020-07-18 | End: 2020-07-20 | Stop reason: HOSPADM

## 2020-07-18 RX ORDER — FAMOTIDINE 10 MG/ML
20 INJECTION, SOLUTION INTRAVENOUS EVERY 12 HOURS SCHEDULED
Status: DISCONTINUED | OUTPATIENT
Start: 2020-07-18 | End: 2020-07-18

## 2020-07-18 RX ADMIN — VENLAFAXINE HYDROCHLORIDE 75 MG: 75 CAPSULE, EXTENDED RELEASE ORAL at 08:36

## 2020-07-18 RX ADMIN — FAMOTIDINE 20 MG: 20 TABLET, FILM COATED ORAL at 18:01

## 2020-07-18 RX ADMIN — MORPHINE SULFATE 2 MG: 2 INJECTION, SOLUTION INTRAMUSCULAR; INTRAVENOUS at 00:50

## 2020-07-18 RX ADMIN — ONDANSETRON 4 MG: 2 INJECTION INTRAMUSCULAR; INTRAVENOUS at 00:50

## 2020-07-18 RX ADMIN — POTASSIUM CHLORIDE AND SODIUM CHLORIDE 100 ML/HR: 900; 150 INJECTION, SOLUTION INTRAVENOUS at 20:51

## 2020-07-18 RX ADMIN — QUETIAPINE FUMARATE 150 MG: 100 TABLET ORAL at 20:35

## 2020-07-18 RX ADMIN — POTASSIUM CHLORIDE AND SODIUM CHLORIDE 100 ML/HR: 900; 150 INJECTION, SOLUTION INTRAVENOUS at 06:36

## 2020-07-18 RX ADMIN — PROMETHAZINE HYDROCHLORIDE 12.5 MG: 25 INJECTION INTRAMUSCULAR; INTRAVENOUS at 18:42

## 2020-07-18 RX ADMIN — ONDANSETRON HYDROCHLORIDE 4 MG: 4 TABLET, FILM COATED ORAL at 18:01

## 2020-07-18 NOTE — PLAN OF CARE
VSS, no n/v, no c/o pain reports feeling much better, CT results called to Dr Shultz, pt to d/c home, awaiting RX confirmation, then d/c home

## 2020-07-18 NOTE — ED PROVIDER NOTES
EMERGENCY DEPARTMENT ENCOUNTER    Room Number:  12/12  Date of encounter:  7/17/2020  PCP: Arian Barnard APRN  Historian: Patient     I used full protective equipment while examining this patient.  This includes face mask, gloves and protective eyewear.  I washed my hands before entering the room and immediately upon leaving the room.  Patient was wearing a surgical mask.      HPI:  Chief Complaint: Abdominal pain, nausea, vomiting  A complete HPI/ROS/PMH/PSH/SH/FH are unobtainable due to: None    Context: Alicia Powell is a 24 y.o. female who presents to the ED c/o nausea, vomiting, and epigastric pain for the past 4 to 5 days.  Pain is described as a burning sensation.  Patient has had multiple episodes of vomiting and states that she cannot keep anything down.  She has taken Zofran, Phenergan, and Carafate without relief.  Nothing makes her symptoms worse.  She has an appointment with Dr. Morrison in 3 days.  Patient does smoke marijuana regularly but states she has not used any since her symptoms began.  She denies fever, chest pain, shortness of breath, dysuria, flank pain, headache, dizziness, fainting, or abnormal vaginal bleeding.      PAST MEDICAL HISTORY  Active Ambulatory Problems     Diagnosis Date Noted   • PCOS (polycystic ovarian syndrome) 05/06/2016   • Hirsutism 05/06/2016   • Obesity 05/06/2016   • Nausea 07/13/2018   • Pain of upper abdomen 07/13/2018   • Non-intractable vomiting with nausea 07/13/2018     Resolved Ambulatory Problems     Diagnosis Date Noted   • Abnormal uterine bleeding (AUB) 05/06/2016   • ASCUS of cervix with negative high risk HPV 08/01/2018     Past Medical History:   Diagnosis Date   • Anemia    • Anxiety    • Asthma    • Depression    • Dizziness    • GERD (gastroesophageal reflux disease)    • H/O ETOH abuse    • Multiple URI    • Pneumonia    • Viral infection          PAST SURGICAL HISTORY  Past Surgical History:   Procedure Laterality Date   • DENTAL PROCEDURE     •  ENDOSCOPY N/A 7/14/2018    Procedure: ESOPHAGOGASTRODUODENOSCOPY WITH COLD BIOPSIES;  Surgeon: Damon Morrison MD;  Location: [facility] ENDOSCOPY;  Service: Gastroenterology   • VAGINAL SEPTUM RESECTION  2012    Excision   • WISDOM TOOTH EXTRACTION  02/2016         FAMILY HISTORY  Family History   Problem Relation Age of Onset   • Anxiety disorder Mother    • Depression Mother    • CALDERON disease Mother    • Hypertension Maternal Grandmother    • Hyperlipidemia Maternal Grandmother    • Other Maternal Grandmother         GERD   • CALDERON disease Father    • Pancreatitis Father    • Heart disease Maternal Grandfather    • Heart disease Paternal Grandfather    • Breast cancer Neg Hx    • Ovarian cancer Neg Hx    • Uterine cancer Neg Hx    • Colon cancer Neg Hx          SOCIAL HISTORY  Social History     Socioeconomic History   • Marital status: Single     Spouse name: Not on file   • Number of children: 0   • Years of education: Not on file   • Highest education level: Not on file   Occupational History   • Occupation: unemployed   Tobacco Use   • Smoking status: Current Some Day Smoker     Types: Cigars   • Smokeless tobacco: Never Used   Substance and Sexual Activity   • Alcohol use: Yes     Frequency: 2-4 times a month     Drinks per session: 3 or 4     Comment: 3 to 4 drinks per month    • Drug use: Yes     Types: Marijuana     Comment: uses twice weekly    • Sexual activity: Yes     Partners: Male     Birth control/protection: Condom   Social History Narrative    Last Pelvic/PAP 2016         ALLERGIES  Acetaminophen       REVIEW OF SYSTEMS  Review of Systems      All systems have been reviewed and are negative except as as discussed in the HPI    PHYSICAL EXAM    I have reviewed the triage vital signs and nursing notes.    ED Triage Vitals [07/17/20 1847]   Temp Heart Rate Resp BP SpO2   98.5 °F (36.9 °C) 89 18 169/92 100 %      Temp src Heart Rate Source Patient Position BP Location FiO2 (%)   -- -- -- -- --        Physical Exam  GENERAL: Awake, alert, appears uncomfortable  HENT: NCAT, nares patent, dry mucous membranes  NECK: supple, no lymphadenopathy  EYES: no scleral icterus  CV: regular rhythm, regular rate, no murmur  RESPIRATORY: normal effort, clear to auscultation bilaterally  ABDOMEN: soft, nontender, nondistended, no CVA tenderness  MUSCULOSKELETAL: Extremities are nontender and without obvious deformity.  There is normal range of motion in all extremities.  There is no calf tenderness or pedal edema  NEURO: Strength, sensation, and coordination are grossly intact.  Speech and mentation are unremarkable.  No facial droop.  SKIN: warm, dry, no rash  PSYCH: Normal mood and affect      LAB RESULTS  Recent Results (from the past 24 hour(s))   Urinalysis With Microscopic If Indicated (No Culture) - Urine, Clean Catch    Collection Time: 07/17/20  7:43 PM   Result Value Ref Range    Color, UA Yellow Yellow, Straw    Appearance, UA Cloudy (A) Clear    pH, UA 7.5 5.0 - 8.0    Specific Gravity, UA >=1.030 1.005 - 1.030    Glucose, UA Negative Negative    Ketones, UA 80 mg/dL (3+) (A) Negative    Bilirubin, UA Negative Negative    Blood, UA Negative Negative    Protein, UA 30 mg/dL (1+) (A) Negative    Leuk Esterase, UA Negative Negative    Nitrite, UA Negative Negative    Urobilinogen, UA 0.2 E.U./dL 0.2 - 1.0 E.U./dL   Urinalysis, Microscopic Only - Urine, Clean Catch    Collection Time: 07/17/20  7:43 PM   Result Value Ref Range    RBC, UA None Seen None Seen, 0-2 /HPF    WBC, UA None Seen None Seen, 0-2 /HPF    Bacteria, UA 2+ (A) None Seen /HPF    Squamous Epithelial Cells, UA 3-6 (A) None Seen, 0-2 /HPF    Hyaline Casts, UA None Seen None Seen /LPF    Methodology Manual Light Microscopy    Comprehensive Metabolic Panel    Collection Time: 07/17/20  7:54 PM   Result Value Ref Range    Glucose 116 (H) 65 - 99 mg/dL    BUN 10 6 - 20 mg/dL    Creatinine 0.83 0.57 - 1.00 mg/dL    Sodium 143 136 - 145 mmol/L     Potassium 3.3 (L) 3.5 - 5.2 mmol/L    Chloride 105 98 - 107 mmol/L    CO2 24.2 22.0 - 29.0 mmol/L    Calcium 9.6 8.6 - 10.5 mg/dL    Total Protein 8.0 6.0 - 8.5 g/dL    Albumin 5.00 3.50 - 5.20 g/dL    ALT (SGPT) 14 1 - 33 U/L    AST (SGOT) 17 1 - 32 U/L    Alkaline Phosphatase 67 39 - 117 U/L    Total Bilirubin 0.5 0.0 - 1.2 mg/dL    eGFR Non African Amer 85 >60 mL/min/1.73    Globulin 3.0 gm/dL    A/G Ratio 1.7 g/dL    BUN/Creatinine Ratio 12.0 7.0 - 25.0    Anion Gap 13.8 5.0 - 15.0 mmol/L   Lipase    Collection Time: 07/17/20  7:54 PM   Result Value Ref Range    Lipase 27 13 - 60 U/L   hCG, Serum, Qualitative    Collection Time: 07/17/20  7:54 PM   Result Value Ref Range    HCG Qualitative Negative Negative   Light Blue Top    Collection Time: 07/17/20  7:54 PM   Result Value Ref Range    Extra Tube hold for add-on    CBC Auto Differential    Collection Time: 07/17/20  7:54 PM   Result Value Ref Range    WBC 10.23 3.40 - 10.80 10*3/mm3    RBC 4.73 3.77 - 5.28 10*6/mm3    Hemoglobin 10.5 (L) 12.0 - 15.9 g/dL    Hematocrit 34.6 34.0 - 46.6 %    MCV 73.2 (L) 79.0 - 97.0 fL    MCH 22.2 (L) 26.6 - 33.0 pg    MCHC 30.3 (L) 31.5 - 35.7 g/dL    RDW 16.9 (H) 12.3 - 15.4 %    RDW-SD 43.5 37.0 - 54.0 fl    MPV 10.2 6.0 - 12.0 fL    Platelets 456 (H) 140 - 450 10*3/mm3    Neutrophil % 87.4 (H) 42.7 - 76.0 %    Lymphocyte % 8.8 (L) 19.6 - 45.3 %    Monocyte % 3.2 (L) 5.0 - 12.0 %    Eosinophil % 0.0 (L) 0.3 - 6.2 %    Basophil % 0.3 0.0 - 1.5 %    Neutrophils, Absolute 8.94 (H) 1.70 - 7.00 10*3/mm3    Lymphocytes, Absolute 0.90 0.70 - 3.10 10*3/mm3    Monocytes, Absolute 0.33 0.10 - 0.90 10*3/mm3    Eosinophils, Absolute 0.00 0.00 - 0.40 10*3/mm3    Basophils, Absolute 0.03 0.00 - 0.20 10*3/mm3       Ordered the above labs and independently reviewed the results.      RADIOLOGY  No Radiology Exams Resulted Within Past 24 Hours    I ordered the above noted radiological studies. Reviewed by me and discussed with radiologist.   See dictation for official radiology interpretation.      PROCEDURES  Procedures      MEDICATIONS GIVEN IN ER    Medications   sodium chloride 0.9 % flush 10 mL (has no administration in time range)   metoclopramide (REGLAN) injection 10 mg (10 mg Intravenous Given 7/17/20 2009)   famotidine (PEPCID) injection 20 mg (20 mg Intravenous Given 7/17/20 2009)   HYDROmorphone (DILAUDID) injection 0.5 mg (0.5 mg Intravenous Given 7/17/20 2010)         PROGRESS, DATA ANALYSIS, CONSULTS, AND MEDICAL DECISION MAKING    All labs have been independently reviewed by me.  All radiology studies have been reviewed by me and discussed with radiologist dictating the report.   EKG's independently viewed and interpreted by me.  I have reviewed the nurse's notes, vital signs, past medical history, and medication list.  Discussion below represents my analysis of pertinent findings related to patient's condition, differential diagnosis, treatment plan and final disposition.    Differential diagnosis includes but is not limited to:  - hepatobiliary pathology such as cholecystitis, cholangitis, and symptomatic cholelithiasis  - Pancreatitis  - Dyspepsia  - Small bowel obstruction  - Appendicitis  - Diverticulitis  - UTI including pyelonephritis  - Ureteral stone  - Zoster  - Colitis, including infectious and ischemic  - Atypical ACS        ED Course as of Jul 17 2233 Fri Jul 17, 2020 1952 Old records reviewed.  This is the patient's fourth ER visit in the past 5 days for epigastric pain, nausea, and vomiting.  She was last seen here yesterday.  Labs are unremarkable except for a potassium of 3.0, hemoglobin 9.7, and 40 ketones in the urine.  Gallbladder ultrasound done on 7/15 was normal.  CT abdomen/pelvis done in May 2020 showed a 3.8 cm left ovarian cyst but was otherwise unremarkable.  EGD done in July 2018 was normal.    [WH]   2120 Test results discussed with the patient.  She states that she was feeling better but is still  nauseated.  Patient does have ketones in her urine and appears dehydrated.  She has tried multiple medications at home without relief.  She does not have an acute abdomen.  Call will be placed to the hospitalist to discuss admission.    []   2149 Case discussed with KATIANA Fulton, and she agrees admit the patient to Dr. Amin.  Pertinent exam findings, test results, ED course were discussed with her.  Will be admitted to [facility].    []      ED Course User Index  [] Micah Will MD       AS OF 22:33 VITALS:    BP - 156/98  HR - 89  TEMP - 98.5 °F (36.9 °C)  O2 SATS - 100%      DIAGNOSIS  Final diagnoses:   Intractable vomiting with nausea, unspecified vomiting type   Dehydration   Epigastric pain         DISPOSITION  Admission    ADMISSION    Discussed treatment plan and reason for admission with pt/family and admitting physician.  Pt/family voiced understanding of the plan for admission for further testing/treatment as needed.         Please note that this document was completed using voice recognition software     Micah Will MD  07/17/20 7980

## 2020-07-18 NOTE — PLAN OF CARE
Problem: Patient Care Overview  Goal: Plan of Care Review  Outcome: Ongoing (interventions implemented as appropriate)   Ivf, med x1 for pain/nausea, npo, gi consult, up ad pari

## 2020-07-18 NOTE — ED NOTES
Pt noted to have mask on when this RN entered the room.  This RN wore mask and goggles throughout our encounter.  Hand hygiene performed upon entering and exiting room       Nikki Byers, RN  07/17/20 5548

## 2020-07-18 NOTE — H&P
Patient Name:  Alicia Powell  YOB: 1995  MRN:  [id number]  Admit Date:  7/17/2020  Patient Care Team:  Arian Barnard APRN as PCP - General (Family Medicine)      Subjective   History Present Illness     Chief Complaint   Patient presents with   • Abdominal Pain   • Vomiting     History of Present Illness   Ms. Powell is a 24 y.o. smoker with a history of GERD, anxiety, depression, asthma, obesity, and PCOS that presents to [facility] complaining of abdominal pain, nausea, and vomiting for the past 5 days.  Patient reports intermittent, epigastric pain that radiates to her chest and upper collarbone.  She also states she noticed some blood in her vomitus.  She has been seen 4 times in the ED in the last 5 days including last night where she was discharged home on Carafate, Phenergan suppository, and a PPI was sent home.  She states her symptoms did not improve with the medication so she decided to come back for further evaluation.  She does admit to smoking marijuana with the last use being today.  She states that she has tried a warm shower which made her feel somewhat better, but her symptoms return.  She states that her last EGD was several years ago.  She has an appointment scheduled with Dr. Morrison of GI next week.  Work-up in the emergency department has been unremarkable.    Review of Systems   Constitutional: Positive for chills. Negative for fever.   HENT: Negative for congestion and rhinorrhea.    Eyes: Negative for photophobia and visual disturbance.   Respiratory: Negative for cough and shortness of breath.    Cardiovascular: Negative for chest pain and palpitations.   Gastrointestinal: Positive for abdominal pain, nausea and vomiting. Negative for constipation and diarrhea.   Endocrine: Negative for cold intolerance and heat intolerance.   Genitourinary: Negative for difficulty urinating and dysuria.   Musculoskeletal: Negative for gait problem and joint swelling.    Skin: Negative for rash and wound.   Neurological: Positive for dizziness, light-headedness and headaches.   Psychiatric/Behavioral: Negative for sleep disturbance and suicidal ideas.        Personal History     Past Medical History:   Diagnosis Date   • Abnormal uterine bleeding (AUB) 5/6/2016   • Anemia    • Anxiety    • ASCUS of cervix with negative high risk HPV 8/1/2018   • Asthma    • Depression    • Dizziness    • GERD (gastroesophageal reflux disease)    • H/O ETOH abuse    • Multiple URI    • PCOS (polycystic ovarian syndrome) 5/6/2016   • PCOS (polycystic ovarian syndrome)    • Pneumonia    • Tobacco abuse 7/17/2020   • Viral infection      Past Surgical History:   Procedure Laterality Date   • DENTAL PROCEDURE     • ENDOSCOPY N/A 7/14/2018    Procedure: ESOPHAGOGASTRODUODENOSCOPY WITH COLD BIOPSIES;  Surgeon: Damon Morrison MD;  Location: [facility] ENDOSCOPY;  Service: Gastroenterology   • VAGINAL SEPTUM RESECTION  2012    Excision   • WISDOM TOOTH EXTRACTION  02/2016     Family History   Problem Relation Age of Onset   • Anxiety disorder Mother    • Depression Mother    • CALDERON disease Mother    • Asthma Mother    • Mental illness Mother    • Hypertension Maternal Grandmother    • Hyperlipidemia Maternal Grandmother    • Other Maternal Grandmother         GERD   • Arthritis Maternal Grandmother    • COPD Maternal Grandmother    • Depression Maternal Grandmother    • Heart disease Maternal Grandmother    • Mental illness Maternal Grandmother    • CALDERON disease Father    • Pancreatitis Father    • Alcohol abuse Father    • Asthma Father    • Heart disease Maternal Grandfather    • Heart disease Paternal Grandfather    • Mental retardation Maternal Uncle    • Breast cancer Neg Hx    • Ovarian cancer Neg Hx    • Uterine cancer Neg Hx    • Colon cancer Neg Hx      Social History     Tobacco Use   • Smoking status: Current Some Day Smoker     Types: Cigars   • Smokeless tobacco: Never Used   • Tobacco comment:  infreq   Substance Use Topics   • Alcohol use: Yes     Frequency: 2-4 times a month     Drinks per session: 3 or 4     Comment: 3 to 4 drinks per month    • Drug use: Yes     Types: Marijuana     Comment: daily     No current facility-administered medications on file prior to encounter.      Current Outpatient Medications on File Prior to Encounter   Medication Sig Dispense Refill   • albuterol sulfate  (90 Base) MCG/ACT inhaler Inhale 2 puffs Every 6 (Six) Hours As Needed for Wheezing or Shortness of Air. 6.7 g 0   • ondansetron ODT (Zofran ODT) 4 MG disintegrating tablet Place 1 tablet on the tongue Every 8 (Eight) Hours As Needed for Nausea or Vomiting. 15 tablet 0   • pantoprazole (PROTONIX) 40 MG EC tablet Take 1 tablet by mouth Daily. 21 tablet 0   • Probiotic Product (PROBIOTIC-10 PO) Take 1 tablet by mouth Daily.     • promethazine (PHENERGAN) 25 MG suppository Insert 1 suppository into the rectum Every 6 (Six) Hours As Needed for Nausea or Vomiting. 24 suppository 0   • promethazine (PHENERGAN) 25 MG tablet Take 1 tablet by mouth Every 6 (Six) Hours As Needed for Nausea or Vomiting. 12 tablet 0   • QUEtiapine (SEROquel) 50 MG tablet Take 3 tablets by mouth Every Night. 90 tablet 5   • sucralfate (CARAFATE) 1 g tablet Take 1 tablet by mouth 4 (Four) Times a Day. 28 tablet 0   • sucralfate (CARAFATE) 1 GM/10ML suspension Take 10 mL by mouth 4 (Four) Times a Day With Meals & at Bedtime. 420 mL 0   • venlafaxine XR (EFFEXOR-XR) 75 MG 24 hr capsule Take 1 capsule by mouth Daily. 30 capsule 5     Allergies   Allergen Reactions   • Acetaminophen Nausea And Vomiting       Objective    Objective     Vital Signs  Temp:  [98.5 °F (36.9 °C)-100 °F (37.8 °C)] 100 °F (37.8 °C)  Heart Rate:  [86-89] 86  Resp:  [18] 18  BP: (156-169)/(92-99) 165/99  SpO2:  [100 %] 100 %  on   ;   Device (Oxygen Therapy): room air  Body mass index is 36.92 kg/m².    Physical Exam   Constitutional: She is oriented to person, place,  and time. She appears well-developed and well-nourished.  Non-toxic appearance. No distress.   HENT:   Head: Normocephalic and atraumatic.   Eyes: Conjunctivae and EOM are normal. Right eye exhibits no discharge. Left eye exhibits no discharge. No scleral icterus.   Neck: Normal range of motion. Neck supple. No JVD present.   Cardiovascular: Normal rate, regular rhythm and normal heart sounds. Exam reveals no gallop and no friction rub.   No murmur heard.  Pulmonary/Chest: Effort normal and breath sounds normal. No respiratory distress. She has no wheezes. She has no rales.   Abdominal: Soft. Bowel sounds are normal. She exhibits no distension. There is tenderness. There is no guarding.   Musculoskeletal: Normal range of motion. She exhibits no edema, tenderness or deformity.   Neurological: She is alert and oriented to person, place, and time.   Skin: Skin is warm and dry. Capillary refill takes less than 2 seconds.   Psychiatric: She has a normal mood and affect. Her behavior is normal.       Results Review:  I reviewed the patient's new clinical results.  I reviewed the patient's new imaging results and agree with the interpretation.  I reviewed the patient's other test results and agree with the interpretation  I personally viewed and interpreted the patient's EKG/Telemetry data  Discussed with ED provider.    Lab Results (last 24 hours)     Procedure Component Value Units Date/Time    Urinalysis With Microscopic If Indicated (No Culture) - Urine, Clean Catch [[id number]]  (Abnormal) Collected:  07/17/20 1943    Specimen:  Urine, Clean Catch Updated:  07/17/20 2040     Color, UA Yellow     Appearance, UA Cloudy     pH, UA 7.5     Specific Gravity, UA >=1.030     Glucose, UA Negative     Ketones, UA 80 mg/dL (3+)     Bilirubin, UA Negative     Blood, UA Negative     Protein, UA 30 mg/dL (1+)     Leuk Esterase, UA Negative     Nitrite, UA Negative     Urobilinogen, UA 0.2 E.U./dL    Urinalysis, Microscopic Only -  Urine, Clean Catch [[id number]]  (Abnormal) Collected:  07/17/20 1943    Specimen:  Urine, Clean Catch Updated:  07/17/20 2043     RBC, UA None Seen /HPF      WBC, UA None Seen /HPF      Bacteria, UA 2+ /HPF      Squamous Epithelial Cells, UA 3-6 /HPF      Hyaline Casts, UA None Seen /LPF      Methodology Manual Light Microscopy    CBC & Differential [[id number]] Collected:  07/17/20 1954    Specimen:  Blood Updated:  07/17/20 2024    Narrative:       The following orders were created for panel order CBC & Differential.  Procedure                               Abnormality         Status                     ---------                               -----------         ------                     CBC Auto Differential[[id number]]        Abnormal            Final result                 Please view results for these tests on the individual orders.    Comprehensive Metabolic Panel [[id number]]  (Abnormal) Collected:  07/17/20 1954    Specimen:  Blood Updated:  07/17/20 2036     Glucose 116 mg/dL      BUN 10 mg/dL      Creatinine 0.83 mg/dL      Sodium 143 mmol/L      Potassium 3.3 mmol/L      Chloride 105 mmol/L      CO2 24.2 mmol/L      Calcium 9.6 mg/dL      Total Protein 8.0 g/dL      Albumin 5.00 g/dL      ALT (SGPT) 14 U/L      AST (SGOT) 17 U/L      Alkaline Phosphatase 67 U/L      Total Bilirubin 0.5 mg/dL      eGFR Non African Amer 85 mL/min/1.73      Globulin 3.0 gm/dL      A/G Ratio 1.7 g/dL      BUN/Creatinine Ratio 12.0     Anion Gap 13.8 mmol/L     Narrative:       GFR Normal >60  Chronic Kidney Disease <60  Kidney Failure <15      Lipase [[id number]]  (Normal) Collected:  07/17/20 1954    Specimen:  Blood Updated:  07/17/20 2036     Lipase 27 U/L     hCG, Serum, Qualitative [[id number]]  (Normal) Collected:  07/17/20 1954    Specimen:  Blood Updated:  07/17/20 2029     HCG Qualitative Negative    CBC Auto Differential [[id number]]  (Abnormal) Collected:  07/17/20 1954    Specimen:  Blood Updated:  07/17/20 2024      WBC 10.23 10*3/mm3      RBC 4.73 10*6/mm3      Hemoglobin 10.5 g/dL      Hematocrit 34.6 %      MCV 73.2 fL      MCH 22.2 pg      MCHC 30.3 g/dL      RDW 16.9 %      RDW-SD 43.5 fl      MPV 10.2 fL      Platelets 456 10*3/mm3      Neutrophil % 87.4 %      Lymphocyte % 8.8 %      Monocyte % 3.2 %      Eosinophil % 0.0 %      Basophil % 0.3 %      Neutrophils, Absolute 8.94 10*3/mm3      Lymphocytes, Absolute 0.90 10*3/mm3      Monocytes, Absolute 0.33 10*3/mm3      Eosinophils, Absolute 0.00 10*3/mm3      Basophils, Absolute 0.03 10*3/mm3           Imaging Results (Last 24 Hours)     ** No results found for the last 24 hours. **          Results for orders placed in visit on 12/08/14   SCANNED - ECHOCARDIOGRAM       No orders to display        Assessment/Plan     Active Hospital Problems    Diagnosis  POA   • **Intractable vomiting [R11.10]  Yes   • Obesity (BMI 30-39.9) [E66.9]  Yes   • GERD (gastroesophageal reflux disease) [K21.9]  Yes   • Anxiety [F41.9]  Yes   • Asthma [J45.909]  Yes   • Tobacco abuse [Z72.0]  Yes   • Hypokalemia [E87.6]  Yes   • PCOS (polycystic ovarian syndrome) [E28.2]  Yes      Resolved Hospital Problems   No resolved problems to display.       Ms. Powell is a 24 y.o. smoker with a history of GERD, anxiety, depression, and asthma who presents with intractable nausea and vomiting.    Intractable nausea and vomiting  -Slight tenderness noted on abdominal exam, ultrasound of gallbladder okay.    -CT abdominal pelvis  -GI consult  -IV hydration overnight  -Antiemetics and pain medication PRN  -Continue Carafate and PPI  -N.p.o. after midnight    Anxiety/depression  -Continue home regimen    Tobacco abuse/marijuana use  -I had a discussion with the patient about stopping her marijuana use as this could be a contributing factor to her nausea.  -Encourage tobacco cessation  -Nicotine patch      I discussed the patient's findings and my recommendations with patient and ED provider.    VTE  Prophylaxis - SCDs.  Code Status - Full code.       MARIBELL Hernandes  [facility]  07/18/20  11:40 PM

## 2020-07-18 NOTE — CONSULTS
[facility]  Initial Inpatient Consult Note    Referring Provider: Deny REYNA    Reason for Consultation: intractable nausea, vomiting    Subjective     History of present illness:      Thank you for requesting my opinion.    This is a 24-year-old beast woman, who was seen Dr. Morrison 1 year ago, admitted through the ER for intractable nausea and vomiting.  She says symptoms have been going on for approximately 1 week.  Unclear precipitants.  She describes severe epigastric pain followed by intractable nausea and vomiting.  She says she has been unable to keep any food down, including toast.  She had been taking Pepcid twice a day for acid reflux symptoms but without relief.  She denies any change in her bowel movements including no diarrhea nor any constipation.    She did have an EGD with Dr. Morrison 1 year ago that was fairly bland.  This was done for epigastric symptoms    She does report that she does smoke marijuana daily and did get some relief with taking hot showers recently.  However, she says that she has not smoked marijuana in about a week    She was able to tolerate full liquid lunch today without any nausea or vomiting.    CT imaging today was normal.    She does have follow-up scheduled with Dr. Morrison later this month    Past Medical History:  Past Medical History:   Diagnosis Date   • Abnormal uterine bleeding (AUB) 5/6/2016   • Anemia    • Anxiety    • ASCUS of cervix with negative high risk HPV 8/1/2018   • Asthma    • Depression    • Dizziness    • GERD (gastroesophageal reflux disease)    • H/O ETOH abuse    • Multiple URI    • PCOS (polycystic ovarian syndrome) 5/6/2016   • PCOS (polycystic ovarian syndrome)    • Pneumonia    • Tobacco abuse 7/17/2020   • Viral infection        Past Surgical History:  Past Surgical History:   Procedure Laterality Date   • DENTAL PROCEDURE     • ENDOSCOPY N/A 7/14/2018    Procedure: ESOPHAGOGASTRODUODENOSCOPY WITH COLD BIOPSIES;  Surgeon:  Damon Morrison MD;  Location: [facility] ENDOSCOPY;  Service: Gastroenterology   • VAGINAL SEPTUM RESECTION  2012    Excision   • WISDOM TOOTH EXTRACTION  02/2016        Social History:   Social History     Tobacco Use   • Smoking status: Current Some Day Smoker     Types: Cigars   • Smokeless tobacco: Never Used   • Tobacco comment: infreq   Substance Use Topics   • Alcohol use: Yes     Frequency: 2-4 times a month     Drinks per session: 3 or 4     Comment: 3 to 4 drinks per month         Family History:  Family History   Problem Relation Age of Onset   • Anxiety disorder Mother    • Depression Mother    • CALDERON disease Mother    • Asthma Mother    • Mental illness Mother    • Hypertension Maternal Grandmother    • Hyperlipidemia Maternal Grandmother    • Other Maternal Grandmother         GERD   • Arthritis Maternal Grandmother    • COPD Maternal Grandmother    • Depression Maternal Grandmother    • Heart disease Maternal Grandmother    • Mental illness Maternal Grandmother    • CALDERON disease Father    • Pancreatitis Father    • Alcohol abuse Father    • Asthma Father    • Heart disease Maternal Grandfather    • Heart disease Paternal Grandfather    • Mental retardation Maternal Uncle    • Breast cancer Neg Hx    • Ovarian cancer Neg Hx    • Uterine cancer Neg Hx    • Colon cancer Neg Hx        Home Meds:  Facility-Administered Medications Prior to Admission   Medication Dose Route Frequency Provider Last Rate Last Dose   • cyanocobalamin injection 1,000 mcg  1,000 mcg Intramuscular Q28 Days Dania Alston PA   1,000 mcg at 12/18/18 1440     Medications Prior to Admission   Medication Sig Dispense Refill Last Dose   • albuterol sulfate  (90 Base) MCG/ACT inhaler Inhale 2 puffs Every 6 (Six) Hours As Needed for Wheezing or Shortness of Air. 6.7 g 0 Taking   • ondansetron ODT (Zofran ODT) 4 MG disintegrating tablet Place 1 tablet on the tongue Every 8 (Eight) Hours As Needed for Nausea or Vomiting. 15 tablet 0  Past Week at Unknown time   • pantoprazole (PROTONIX) 40 MG EC tablet Take 1 tablet by mouth Daily. 21 tablet 0 Past Week at Unknown time   • Probiotic Product (PROBIOTIC-10 PO) Take 1 tablet by mouth Daily.   Past Week at Unknown time   • promethazine (PHENERGAN) 25 MG suppository Insert 1 suppository into the rectum Every 6 (Six) Hours As Needed for Nausea or Vomiting. 24 suppository 0 7/17/2020 at Unknown time   • promethazine (PHENERGAN) 25 MG tablet Take 1 tablet by mouth Every 6 (Six) Hours As Needed for Nausea or Vomiting. 12 tablet 0 7/17/2020 at Unknown time   • QUEtiapine (SEROquel) 50 MG tablet Take 3 tablets by mouth Every Night. 90 tablet 5 7/16/2020 at Unknown time   • sucralfate (CARAFATE) 1 g tablet Take 1 tablet by mouth 4 (Four) Times a Day. 28 tablet 0 7/17/2020 at Unknown time   • sucralfate (CARAFATE) 1 GM/10ML suspension Take 10 mL by mouth 4 (Four) Times a Day With Meals & at Bedtime. 420 mL 0 7/17/2020 at Unknown time   • venlafaxine XR (EFFEXOR-XR) 75 MG 24 hr capsule Take 1 capsule by mouth Daily. 30 capsule 5 7/16/2020 at Unknown time       Current Meds:     famotidine 20 mg Oral BID AC   QUEtiapine 150 mg Oral Nightly   venlafaxine XR 75 mg Oral Daily       Allergies:  Allergies   Allergen Reactions   • Acetaminophen Nausea And Vomiting       Review of Systems  All systems were reviewed and negative except for:  Gastrointestinal: positive for  Nausea, vomiting, epigastric pain     Objective     Vital Signs  Temp:  [98.3 °F (36.8 °C)-100 °F (37.8 °C)] 98.5 °F (36.9 °C)  Heart Rate:  [85-96] 96  Resp:  [18] 18  BP: (129-169)/(80-99) 143/93    Physical Exam:  Constitutional:   Alert, cooperative, in no acute distress, appears stated age   Eyes:           Lids and lashes normal, conjunctivae and sclerae normal,   no icterus   Ears, nose, mouth and throat:  Normal appearance of external ears and nose, no oral l  lesions, no thrush, oral mucosa moist   Respiratory:    Clear to auscultation,  respirations regular, even and             unlabored    Cardiovascular:   Regular rhythm and normal rate, normal S1 and S2, no        murmur, no gallop, palpable distal pulses, no lower extremity edema   Gastrointestinal:    Soft, obese, nondistended, nontender to palpation, no guarding, no rebound tenderness, normal bowel sounds, no palpable masses or organomegaly  Rectal exam: deferred   Musculoskeletal:  Normal station, no atrophy, no tenderness to palpation, normal digits and nails   Skin:  Normal color, no bleeding, bruising, rashes or lesions   Lymphatics:  No palpable cervical or supraclavicular adenopathy   Psychiatric:  Judgement and insight: normal   Orientation to person, place and time: normal   Mood and affect: normal       Results Review:   I reviewed the patient's new clinical results.    Results from last 7 days   Lab Units 07/18/20 0434 07/17/20 1954 07/16/20  0624   WBC 10*3/mm3 9.06 10.23 7.94   HEMOGLOBIN g/dL 9.0* 10.5* 9.7*   HEMATOCRIT % 29.7* 34.6 32.1*   PLATELETS 10*3/mm3 345 456* 347       Results from last 7 days   Lab Units 07/18/20 0434 07/17/20 1954 07/16/20  0625 07/15/20  0035   SODIUM mmol/L 138 143 139 142   POTASSIUM mmol/L 3.3* 3.3* 3.0* 3.5   CHLORIDE mmol/L 104 105 101 101   CO2 mmol/L 24.3 24.2 24.7 23.9   BUN mg/dL 9 10 10 9   CREATININE mg/dL 0.70 0.83 0.72 0.74   CALCIUM mg/dL 8.9 9.6 9.4 10.0   BILIRUBIN mg/dL  --  0.5 0.4 0.2   ALK PHOS U/L  --  67 60 69   ALT (SGPT) U/L  --  14 10 14   AST (SGOT) U/L  --  17 12 14   GLUCOSE mg/dL 81 116* 93 96             Lab Results   Lab Value Date/Time    LIPASE 27 07/17/2020 1954    LIPASE 56 07/16/2020 0625    LIPASE 29 07/15/2020 0035    LIPASE 21 07/13/2020 0043    LIPASE 21 05/29/2020 0302    LIPASE 38 12/04/2018 0832    LIPASE 22 12/03/2018 2034    LIPASE 39 09/18/2018 0944    LIPASE 26 09/18/2018 0047    LIPASE 57 06/30/2018 0111    LIPASE 46 06/28/2018 2052    LIPASE 246 (H) 06/27/2018 1205    LIPASE 30 07/17/2017 0933        Radiology:  Imaging Results (Last 72 Hours)     Procedure Component Value Units Date/Time    CT Abdomen Pelvis Without Contrast [[id number]] Collected:  07/18/20 1429     Updated:  07/18/20 1429    Narrative:       CT ABDOMEN PELVIS WITHOUT CONTRAST     HISTORY: 24-year-old female with history of nausea and vomiting and  pain.      TECHNIQUE: CT abdomen and pelvis without IV or oral contrast.      COMPARISON:  CT abdomen and pelvis 05/29/2020, 12/04/2018.      FINDINGS: Lung bases appear clear and there is no basilar effusion.  Liver, spleen, adrenal glands, pancreas, and kidneys appear within  normal limits. There is no hydronephrosis. Normal appendix is  visualized. There is no bowel dilatation or evidence for bowel  obstruction. There is no ascites. There is mild stranding and haziness  within the periaortic region/retroperitoneum and this exhibits no  change. There is no ascites.       Impression:       No evidence for acute abnormality in the abdomen or pelvis.  No renal stone or hydronephrosis.     Radiation dose reduction techniques were utilized, including automated  exposure control and exposure modulation based on body size.                Assessment/Plan       Intractable vomiting    PCOS (polycystic ovarian syndrome)    Obesity (BMI 30-39.9)    GERD (gastroesophageal reflux disease)    Anxiety    Asthma    Tobacco abuse    Hypokalemia      Impression  1.  Intractable nausea and vomiting: Seems to be improving.  No acute findings on her CT scan.  I suspect there is a component of GERD plus cannabis hyperemesis syndrome    2.  Epigastric pain: Suspected GERD compounded by recent vomiting    3.  Microcytic anemia: She endorses heavy periods and I suspect she has iron deficiency anemia from this    Plan  She is overall doing better today and is tolerated full liquids without any vomiting or pain  Continue daily pantoprazole  Okay for some Zofran and/or Phenergan to go home with  I discussed staying on a  bland, low-fat diet  Follow-up with Dr. Morrison as scheduled to determine plan for further work-up  Continue to avoid marijuana    Okay for home from GI standpoint    I discussed the patients findings and my recommendations with patient, family and nursing staff    Adrianne Bautista MD  [facility]      Dictated utilizing Dragon dictation

## 2020-07-18 NOTE — DISCHARGE SUMMARY
[facility]    Date of Discharge:  7/18/2020    PCP: Arian Barnard, MARIBELL    Discharge Diagnosis:   Active Hospital Problems    Diagnosis  POA   • **Intractable vomiting [R11.10]  Yes   • Obesity (BMI 30-39.9) [E66.9]  Yes   • GERD (gastroesophageal reflux disease) [K21.9]  Yes   • Anxiety [F41.9]  Yes   • Asthma [J45.909]  Yes   • Tobacco abuse [Z72.0]  Yes   • Hypokalemia [E87.6]  Yes   • PCOS (polycystic ovarian syndrome) [E28.2]  Yes      Resolved Hospital Problems   No resolved problems to display.     Procedures Performed       Consults     Date and Time Order Name Status Description    7/17/2020 4473 Inpatient Gastroenterology Consult      7/17/2020 1297 [facility] (on-call MD unless specified) Details Completed         Hospital Course  Please see history and physical for details. Patient is a 24 y.o. female admitted by my nurse practitioner overnight for nausea and vomiting.  This patient has been using the ER recurrently over the last couple days for the above issues.  She is supposed to be seen by GI sometime next week and ultimately was admitted under observation status overnight.  Again her emesis has resolved and she clinically feels much better.  She had a right upper quadrant ultrasound which is unremarkable.  Lab work is completely unremarkable other than some anemia but patient denies any hematemesis.  Her potassium is just a little bit on the low side we are supplementing that through IVF.  She has no issues with fevers and her white count is normal.  She received IV fluids IV Reglan IV morphine and IV Pepcid.  I have not continue with any further Reglan and I have discontinued morphine.  I think the etiology of this is either cyclic vomiting from use of marijuana versus concerns for gastritis secondary to psychiatric past history.  She admits to multiple stressors.  Review of 2018 EGD was unremarkable including pathology results.  At this juncture  I feel the patient's diet can be advanced and will continue IV fluids to help supplement the potassium.  I went to check a CT of the abdomen and pelvis to ensure there is no additional etiologies going on and await further recommendations from GI which is pending.  If GI has no objections or plans for further work-up as an inpatient as I think this patient can be managed as an outpatient, and CT of the abdomen is unremarkable then I feel this patient is more than medically stable for disposition today.  Diet will be advanced to full liquids.  Consideration for outpatient gastric emptying study should be considered.  Psychiatric consultation should also be considered.  No family present at bedside.  I went out discussed case with RN to try to ensure a smooth disposition home today if we are able to obtain this.       Condition on Discharge: Improved.     Temp:  [98.3 °F (36.8 °C)-100 °F (37.8 °C)] 98.5 °F (36.9 °C)  Heart Rate:  [85-96] 96  Resp:  [18] 18  BP: (129-169)/(80-99) 143/93  Body mass index is 36.92 kg/m².    Physical Exam   Constitutional: She is oriented to person, place, and time. She appears well-developed and well-nourished.   Obese with a BMI of 37   Eyes: Conjunctivae are normal. No scleral icterus.   Neck: No JVD present.   Cardiovascular: Normal rate and regular rhythm.   Pulmonary/Chest: Effort normal and breath sounds normal. No respiratory distress.   Abdominal: Soft. Bowel sounds are normal. She exhibits no distension and no mass. There is no tenderness. There is no rebound and no guarding.   Musculoskeletal: She exhibits no edema.   Neurological: She is alert and oriented to person, place, and time. No cranial nerve deficit.        Discharge Medications      New Medications      Instructions Start Date   famotidine 20 MG tablet  Commonly known as:  PEPCID   20 mg, Oral, 2 Times Daily Before Meals         Continue These Medications      Instructions Start Date   albuterol sulfate  (90  Base) MCG/ACT inhaler  Commonly known as:  PROVENTIL HFA;VENTOLIN HFA;PROAIR HFA   2 puffs, Inhalation, Every 6 Hours PRN      ondansetron ODT 4 MG disintegrating tablet  Commonly known as:  Zofran ODT   4 mg, Translingual, Every 8 Hours PRN      PROBIOTIC-10 PO   1 tablet, Oral, Daily      QUEtiapine 50 MG tablet  Commonly known as:  SEROquel   150 mg, Oral, Nightly      venlafaxine XR 75 MG 24 hr capsule  Commonly known as:  EFFEXOR-XR   75 mg, Oral, Daily         Stop These Medications    pantoprazole 40 MG EC tablet  Commonly known as:  PROTONIX     promethazine 25 MG suppository  Commonly known as:  PHENERGAN     promethazine 25 MG tablet  Commonly known as:  PHENERGAN     sucralfate 1 g tablet  Commonly known as:  CARAFATE     sucralfate 1 GM/10ML suspension  Commonly known as:  CARAFATE             Additional Instructions for the Follow-ups that You Need to Schedule     Discharge Follow-up with PCP   As directed       Currently Documented PCP:    Arian Barnard APRN    PCP Phone Number:    [phone]     Follow Up Details:  PCP 2 to 3 weeks.  GI per the recommendations           Follow-up Information     Arian Barnard APRN .    Specialties:  Family Medicine, Nurse Practitioner  Why:  PCP 2 to 3 weeks.  GI per the recommendations  Contact information:  [address] [place], [place] [id number] [phone]                 Test Results Pending at Discharge     Chris Shultz MD  07/18/20  10:00    Discharge time spent greater than 30 minutes.

## 2020-07-19 LAB
AMPHET+METHAMPHET UR QL: NEGATIVE
BARBITURATES UR QL SCN: NEGATIVE
BENZODIAZ UR QL SCN: NEGATIVE
CANNABINOIDS SERPL QL: POSITIVE
COCAINE UR QL: NEGATIVE
METHADONE UR QL SCN: NEGATIVE
OPIATES UR QL: NEGATIVE
OXYCODONE UR QL SCN: NEGATIVE

## 2020-07-19 PROCEDURE — 80307 DRUG TEST PRSMV CHEM ANLYZR: CPT | Performed by: PSYCHIATRY & NEUROLOGY

## 2020-07-19 PROCEDURE — 90791 PSYCH DIAGNOSTIC EVALUATION: CPT

## 2020-07-19 PROCEDURE — 99213 OFFICE O/P EST LOW 20 MIN: CPT | Performed by: INTERNAL MEDICINE

## 2020-07-19 PROCEDURE — 25810000003 SODIUM CHLORIDE 0.9 % WITH KCL 20 MEQ 20-0.9 MEQ/L-% SOLUTION: Performed by: NURSE PRACTITIONER

## 2020-07-19 PROCEDURE — 96361 HYDRATE IV INFUSION ADD-ON: CPT

## 2020-07-19 PROCEDURE — 25010000002 ZIPRASIDONE MESYLATE PER 10 MG: Performed by: NURSE PRACTITIONER

## 2020-07-19 PROCEDURE — 63710000001 ONDANSETRON PER 8 MG: Performed by: NURSE PRACTITIONER

## 2020-07-19 PROCEDURE — 96376 TX/PRO/DX INJ SAME DRUG ADON: CPT

## 2020-07-19 PROCEDURE — 93010 ELECTROCARDIOGRAM REPORT: CPT | Performed by: INTERNAL MEDICINE

## 2020-07-19 PROCEDURE — 96372 THER/PROPH/DIAG INJ SC/IM: CPT

## 2020-07-19 PROCEDURE — 25010000002 ONDANSETRON PER 1 MG: Performed by: NURSE PRACTITIONER

## 2020-07-19 PROCEDURE — 25010000002 ZIPRASIDONE MESYLATE PER 10 MG: Performed by: HOSPITALIST

## 2020-07-19 PROCEDURE — G0378 HOSPITAL OBSERVATION PER HR: HCPCS

## 2020-07-19 RX ORDER — ZIPRASIDONE MESYLATE 20 MG/ML
10 INJECTION, POWDER, LYOPHILIZED, FOR SOLUTION INTRAMUSCULAR
Status: DISCONTINUED | OUTPATIENT
Start: 2020-07-19 | End: 2020-07-20 | Stop reason: HOSPADM

## 2020-07-19 RX ORDER — ZIPRASIDONE MESYLATE 20 MG/ML
20 INJECTION, POWDER, LYOPHILIZED, FOR SOLUTION INTRAMUSCULAR EVERY 4 HOURS PRN
Status: DISCONTINUED | OUTPATIENT
Start: 2020-07-19 | End: 2020-07-20 | Stop reason: HOSPADM

## 2020-07-19 RX ORDER — ZIPRASIDONE MESYLATE 20 MG/ML
20 INJECTION, POWDER, LYOPHILIZED, FOR SOLUTION INTRAMUSCULAR
Status: DISCONTINUED | OUTPATIENT
Start: 2020-07-19 | End: 2020-07-20 | Stop reason: HOSPADM

## 2020-07-19 RX ADMIN — FAMOTIDINE 20 MG: 20 TABLET, FILM COATED ORAL at 06:32

## 2020-07-19 RX ADMIN — VENLAFAXINE HYDROCHLORIDE 75 MG: 75 CAPSULE, EXTENDED RELEASE ORAL at 10:27

## 2020-07-19 RX ADMIN — ONDANSETRON HYDROCHLORIDE 4 MG: 4 TABLET, FILM COATED ORAL at 23:17

## 2020-07-19 RX ADMIN — ZIPRASIDONE MESYLATE 20 MG: 20 INJECTION, POWDER, LYOPHILIZED, FOR SOLUTION INTRAMUSCULAR at 18:01

## 2020-07-19 RX ADMIN — FAMOTIDINE 20 MG: 20 TABLET, FILM COATED ORAL at 17:00

## 2020-07-19 RX ADMIN — POTASSIUM CHLORIDE AND SODIUM CHLORIDE 100 ML/HR: 900; 150 INJECTION, SOLUTION INTRAVENOUS at 06:34

## 2020-07-19 RX ADMIN — ONDANSETRON 4 MG: 2 INJECTION INTRAMUSCULAR; INTRAVENOUS at 01:25

## 2020-07-19 RX ADMIN — ZIPRASIDONE MESYLATE 20 MG: 20 INJECTION, POWDER, LYOPHILIZED, FOR SOLUTION INTRAMUSCULAR at 01:11

## 2020-07-19 RX ADMIN — POTASSIUM CHLORIDE AND SODIUM CHLORIDE 100 ML/HR: 900; 150 INJECTION, SOLUTION INTRAVENOUS at 17:01

## 2020-07-19 NOTE — PLAN OF CARE
B/P elevated when anxious, no N/V, no dry heaving,no suicidal intentions voiced, states she is feeling better & wants to go home, seen by Psych & GI, 1:1 sitter discontinued per Dr Vivas, GI ok with d/c home if OK with [facility], Dr Shultz notified, states he will review their notes & may d/c home later today. Pt asks frequently if she can go home, explained Dr Shultz needed to review other MD notes & would decide later, has walked frequently in amato both with sitter & , states she is feeling anxious just in room

## 2020-07-19 NOTE — PROGRESS NOTES
"    DAILY PROGRESS NOTE  [facility]    Patient Identification:  Name: Alicia Powell  Age: 24 y.o.  Sex: female  :  1995  MRN: [id number]         Primary Care Physician: Arian Barnard APRN    Subjective:  Interval History: Currently patient feels nauseous.  See below it assessment and plan as I further illustrate our discussions.    Objective: RN x2 with me at bedside as chaperone with bedside sitter present.  No family present.  Patient seems emotionally unstable.  She is conversational and otherwise pleasant.  She was thankful for my counseling and in agreement with much of my assessment.    Scheduled Meds:  famotidine 20 mg Oral BID AC   LORazepam 0.5 mg Intravenous Once   venlafaxine XR 75 mg Oral Daily   ziprasidone 10 mg Intramuscular Daily With Breakfast   ziprasidone 20 mg Intramuscular Daily With Dinner     Continuous Infusions:  sodium chloride 0.9 % with KCl 20 mEq 100 mL/hr Last Rate: 100 mL/hr (20 0634)       Vital signs in last 24 hours:  Temp:  [98.2 °F (36.8 °C)-98.9 °F (37.2 °C)] 98.2 °F (36.8 °C)  Heart Rate:  [] 106  Resp:  [16-18] 16  BP: (143-176)/() 156/100    Intake/Output:    Intake/Output Summary (Last 24 hours) at 2020 0827  Last data filed at 2020 0642  Gross per 24 hour   Intake 2170 ml   Output 2800 ml   Net -630 ml       Exam:  /100 (BP Location: Left arm, Patient Position: Sitting)   Pulse 106   Temp 98.2 °F (36.8 °C) (Oral)   Resp 16   Ht 162.6 cm (64\")   Wt 97.6 kg (215 lb 1.6 oz)   LMP 2020   SpO2 100%   BMI 36.92 kg/m²     General Appearance:    Alert, cooperative, tearful, AAOx3                          Head:    Normocephalic, without obvious abnormality, atraumatic                        Throat:   Oral mucosa pink and moist                           Neck:   No JVD                         Lungs:    Clear to auscultation bilaterally, respirations unlabored                          Heart:    Regular rate and " rhythm, S1 and S2 normal                  Abdomen:     Soft, nontender, bowel sounds active                 Extremities: Moving all with adequate/appropriate strength, no cyanosis or edema                        Pulses:   Pulses palpable in lower extremities                            Skin:   Skin is warm and dry                  Neurologic:   CNII-XII intact, no focal deficits noted     Data Review:  Labs in chart were reviewed.    Assessment:  Active Hospital Problems    Diagnosis  POA   • **Intractable vomiting [R11.10]  Yes   • Obesity (BMI 30-39.9) [E66.9]  Yes   • GERD (gastroesophageal reflux disease) [K21.9]  Yes   • Anxiety [F41.9]  Yes   • Asthma [J45.909]  Yes   • Tobacco abuse [Z72.0]  Yes   • Hypokalemia [E87.6]  Yes   • PCOS (polycystic ovarian syndrome) [E28.2]  Yes      Resolved Hospital Problems   No resolved problems to display.       Plan:    Patient was discharged yesterday and then I find it a bit interesting that as she starts to learn about her plans for disposition the nausea and emesis comes right back.  I went ahead and cancel discharge and gave a one-time dose of IV Phenergan.  I had concerns yesterday about secondary gains with this patient and concerns for medication/drug-seeking behaviors.  At that juncture I continued IVF as well as full liquid diet implemented IV Zofran and p.o. versus TX Phenergan.  I did not want any IV narcotics given for her complaints of pain nor did I want any IV Ativan administered.  Patient received 20 mg IM Geodon with some mild relief.    I discussed the case at length with nurses x2 including night nurse and a nurse this morning    I also went into the room and discussed the case with the patient and illustrated my above concerns.  She was quite forthcoming with the fact that she openly admitted to numerous home stressors.  She states she is financially broke though her  is working as a contractor.  Patient states she has running water and  electricity at this point but they have no air conditioning.  She also states that she has a plethora of animals including I believe 9 cats.      Suicide precautions were taken overnight secondary to her reports.  She is currently not admitting to any SI/HI    I have placed a consult to psychiatry for uncontrolled depression and ultimately still feel this patient is medically stable for discharge unless there is any objection from psychiatry and I would appreciate their input in regards to the necessity for further needs for suicide precautions.  I am curious if they would be interested in taking this patient to the [facility] unless they endorse outpatient counseling which is also going to be tough for this patient to attend as well as afford.    Regardless at this point I still feel the patient is medically stable for disposition.  I feel she is manifesting her emotions and her gotten she is forcibly causing herself to have emesis.  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  Still giving patient the benefit of the doubt and continuing with treatment of Pepcid for GI prophylaxis/treatment of GERD    Chris Shultz MD  7/19/2020  08:27

## 2020-07-19 NOTE — CONSULTS
"Educated about process. C/o epigrastric pain to me too, commenting that \"I'm in such severe pain, I wish I was dead right now, honestly\". Asked if she thinks she would still be wishing to die if she had adequate pain meds, \"no\" (conditional wishing to die), \"no, it just hurts so bad that I would rather be dead than feel like this\".     Reports her pain has been going on for 3 hours. Reports that she has been wishing she were dead for about the last 2 hours.     Reports that her last suicide attempt was at age 14, denies suicide attempts since then.     Asked if she has thought about suicide since age, \"very rarely\". Asked the last time, \"right now, like I said, I would rather do that, than be right here in pain\" (conditional SI).     Denies plan. Denies intention. Nader to tell staff prior to acting on self harm thoughts if she does develop intention of acting on these thoughts, commenting, \"I don't want to actually die, I just want help\".     Reports one inpatient psych hospitalization at age 14. Denies ever seeing an outpatient psychiatrist in her life. Reports that she has seen therapists from time to time, most recently a year or 2 ago.     Reports that she last used marijuana 6 days ago. Reports rare alcohol use.     Reports she started using self harm behaviors such as cutting, pulling hair and punching self at age 20, last did this about a month or 2 ago.     Case discussed with pt's RN, who informs me that pt has been medically worked up, with no identified physical cause for her stated extreme pain and nausea. Documentation reviewed.     I will request 1:1 sitter for pt's report of SI and prior suicide attempts while she is inpatient (based on my clinical manager's advisements for all patients with prior reported suicide attempts), but would have recommended d/c from a psychiatric perspective, had I seen this patient in the ED.     Acting clinical impression: malingering for secondary gains of seeking " medications.     Will place on Access f/u list.     Cased discussed with NP Kirstin Lancaster. I expressed concern about administering ativan, given that it is a controlled substance for a patient who appears to be medication seeking, and asked about appropriateness of administering ziprasidone 20 IM as an alternative. Kirstin provided verbal order for 1:1 sitter, and another verbal order for ECG, prior to possible administration of ziprasidone.     Discussed with david Denis's RN, who informed me she would call me back with QRS interval from ECG.

## 2020-07-19 NOTE — CONSULTS
"Responded to nurse referral for SC for pt. Pt expressed that she was in pain and asked for pain medication, some by name but \"any pain medication would do.\" Sat with pt to hear some of her story but pt always brought things to her pain and need for something. Several times she expressed the pain \"is so bad I'd rather be dead.\" RN had already informed me an access consult had been put in. Continued with pt until nurse stepped in with a hot towel for some pain relief.   "

## 2020-07-19 NOTE — PROGRESS NOTES
[facility]  Inpatient Progress Note    Reason for Follow Up: Epigastric pain, nausea, vomiting    Subjective     Interval History:   One-to-one sitter for suicidal ideation has now been DC'd.  She denies any issues with epigastric pain.  She has been tolerating diet well.    I reviewed Dr. Shultz's note and there are significant social stressors at home.    Current Facility-Administered Medications:   •  acetaminophen (TYLENOL) tablet 650 mg, 650 mg, Oral, Q4H PRN, Olivia Barclay APRN  •  albuterol (PROVENTIL) nebulizer solution 0.083% 2.5 mg/3mL, 2.5 mg, Nebulization, Q6H PRN, Olivia Barclay APRN  •  aluminum-magnesium hydroxide-simethicone (MAALOX MAX) 400-400-40 MG/5ML suspension 15 mL, 15 mL, Oral, Q6H PRN, Olivia Barclay APRN  •  bisacodyl (DULCOLAX) EC tablet 5 mg, 5 mg, Oral, Daily PRN, Olivia Barclay APRN  •  bisacodyl (DULCOLAX) suppository 10 mg, 10 mg, Rectal, Daily PRN, Olivia Barclay APRN  •  famotidine (PEPCID) tablet 20 mg, 20 mg, Oral, BID PRISCILA, Chris Shultz MD, 20 mg at 07/19/20 0632  •  LORazepam (ATIVAN) injection 0.5 mg, 0.5 mg, Intravenous, Once, Kirstin Lancaster APRN  •  ondansetron (ZOFRAN) tablet 4 mg, 4 mg, Oral, Q6H PRN, 4 mg at 07/18/20 1801 **OR** ondansetron (ZOFRAN) injection 4 mg, 4 mg, Intravenous, Q6H PRN, Olivia Barclay APRN, 4 mg at 07/19/20 0125  •  promethazine (PHENERGAN) suppository 25 mg, 25 mg, Rectal, Q6H PRN, Olivia Barclay, MARIBELL  •  sodium chloride 0.9 % flush 10 mL, 10 mL, Intravenous, PRN, Micah Will MD  •  sodium chloride 0.9 % flush 10 mL, 10 mL, Intravenous, PRN, Olivia Barclay APRN  •  sodium chloride 0.9 % with KCl 20 mEq/L infusion, 100 mL/hr, Intravenous, Continuous, Olivia Barclay APRN, Last Rate: 100 mL/hr at 07/19/20 0634, 100 mL/hr at 07/19/20 0634  •  venlafaxine XR (EFFEXOR-XR) 24 hr capsule 75 mg, 75 mg, Oral, Daily, Olivia Barclay APRN, 75 mg at 07/19/20  1027  •  ziprasidone (GEODON) injection 10 mg, 10 mg, Intramuscular, Daily With Breakfast, Chris Shultz MD  •  ziprasidone (GEODON) injection 20 mg, 20 mg, Intramuscular, Q4H PRN, Olivia Barclay, APRN, 20 mg at 07/19/20 0111  •  ziprasidone (GEODON) injection 20 mg, 20 mg, Intramuscular, Daily With Dinner, Chris Shultz MD  Review of Systems:     The following systems were reviewed and negative: Constitution, pulmonary, cardiac, gastrointestinal, genitourinary      Objective     Vital Signs  Temp:  [98.2 °F (36.8 °C)-98.9 °F (37.2 °C)] 98.4 °F (36.9 °C)  Heart Rate:  [] 112  Resp:  [16-18] 16  BP: (144-176)/() 144/89  Body mass index is 36.92 kg/m².    Intake/Output Summary (Last 24 hours) at 7/19/2020 1225  Last data filed at 7/19/2020 1000  Gross per 24 hour   Intake 2410 ml   Output 2550 ml   Net -140 ml     I/O this shift:  In: 240 [P.O.:240]  Out: 250 [Urine:250]     Physical Exam:   General: patient awake, alert and cooperative   Eyes: Normal lids and lashes, no scleral icterus   Neck: supple, normal ROM   Skin: warm and dry, not jaundiced   Cardiovascular: regular rhythm and rate, no murmurs auscultated   Pulm: clear to auscultation bilaterally, regular and unlabored   Abdomen: soft, obese, nontender, nondistended; normal bowel sounds   Rectal: deferred   Extremities: no rash or edema   Psychiatric: Normal mood and behavior; memory intact     Results Review:     I reviewed the patient's new clinical results.    Results from last 7 days   Lab Units 07/18/20 0434 07/17/20 1954 07/16/20  0624   WBC 10*3/mm3 9.06 10.23 7.94   HEMOGLOBIN g/dL 9.0* 10.5* 9.7*   HEMATOCRIT % 29.7* 34.6 32.1*   PLATELETS 10*3/mm3 345 456* 347     Results from last 7 days   Lab Units 07/18/20  0434 07/17/20  1954 07/16/20  0625 07/15/20  0035   SODIUM mmol/L 138 143 139 142   POTASSIUM mmol/L 3.3* 3.3* 3.0* 3.5   CHLORIDE mmol/L 104 105 101 101   CO2 mmol/L 24.3 24.2 24.7 23.9   BUN mg/dL 9 10 10 9    CREATININE mg/dL 0.70 0.83 0.72 0.74   CALCIUM mg/dL 8.9 9.6 9.4 10.0   BILIRUBIN mg/dL  --  0.5 0.4 0.2   ALK PHOS U/L  --  67 60 69   ALT (SGPT) U/L  --  14 10 14   AST (SGOT) U/L  --  17 12 14   GLUCOSE mg/dL 81 116* 93 96         Lab Results   Lab Value Date/Time    LIPASE 27 07/17/2020 1954    LIPASE 56 07/16/2020 0625    LIPASE 29 07/15/2020 0035    LIPASE 21 07/13/2020 0043    LIPASE 21 05/29/2020 0302    LIPASE 38 12/04/2018 0832    LIPASE 22 12/03/2018 2034    LIPASE 39 09/18/2018 0944    LIPASE 26 09/18/2018 0047    LIPASE 57 06/30/2018 0111    LIPASE 46 06/28/2018 2052    LIPASE 246 (H) 06/27/2018 1205    LIPASE 30 07/17/2017 0933       Radiology:  CT Abdomen Pelvis Without Contrast   Final Result   No evidence for acute abnormality in the abdomen or pelvis.   No renal stone or hydronephrosis.       Radiation dose reduction techniques were utilized, including automated   exposure control and exposure modulation based on body size.       This report was finalized on 7/18/2020 2:40 PM by Dr. Roman Cano M.D.              Assessment/Plan     Patient Active Problem List   Diagnosis   • PCOS (polycystic ovarian syndrome)   • Hirsutism   • Obesity   • Nausea   • Pain of upper abdomen   • Non-intractable vomiting with nausea   • Intractable vomiting   • Obesity (BMI 30-39.9)   • GERD (gastroesophageal reflux disease)   • Anxiety   • Asthma   • Tobacco abuse   • Hypokalemia       Impression  1.  Nausea and vomiting: Resolved    2.  Epigastric pain: Improved    3.  Microcytic anemia: Suspected secondary to menstrual losses    Plan  Continue bland diet  Continue PPI  As needed nausea medications  Stop marijuana usage  It appears that there are component of social stressors contributing to her symptoms which have been addressed by psychiatry    She has follow-up scheduled in our office and she plans to keep that appointment     Okay for home from GI standpoint    I discussed the patients findings and my  recommendations with patient, family and nursing staff.    Over 25 minutes was spent reviewing the patient's chart and records, in discussion with the patient, in examination of the patient, and in discussion with members of the patient's medical team.    Adrianne Bautista MD

## 2020-07-19 NOTE — CONSULTS
"Requesting Physician:  Dr. Shultz  Reason for Consult: Depression    Date of admission: July 17, 2020  Date of assessment: July 19, 2020    Chief Complaint: None given    History of presenting illness: Patient is a  24 y.o. female with a past psychiatric history of depression and anxiety seen on consultation for depression and suicidal ideation.  The patient had presented to the ED complaining of epigastric pain and intractable vomiting.  Patient had been seen 4 times in the ED over a period of 5 days prior to admission.  She has had a GI work-up that has been unremarkable.  She was to be discharged home yesterday, but her nausea and emesis suddenly worsened.  She then reported suicidal ideation.  She was seen by access and reported that she would not have a desire to die if she had adequate pain medication.  She has reportedly been asking for various pain medications, some by name.  She was placed on a one-to-one due to reports of suicidal ideation.  She has been noted to possibly be malingering.    I reviewed the case with the patient's nurse.  Nurse reports that patient seem to be with \"forced\" vomiting yesterday.  She has had no vomiting this morning and only mild complaints of epigastric pain patient is reportedly asking for discharge.    Patient reports a previous inpatient admission to [facility] at 14 years old.  She has no history of suicide attempt.  She does report a history of self harming behaviors in the form of scratching her arm.  This last occurred 1 1/2 years ago.  She has no current outpatient psychiatrist.  Recently she has been treated by her primary care physician with Effexor and Seroquel.  Currently she reports she is \"slightly\" depressed.  She reports that she has been off her psych meds for several days due to vomiting.  Overall she has been on Effexor for 6 months and feels that this helps.  She denies current suicidal ideation, homicidal ideation and audiovisual " hallucinations.  She denies any current pain or nausea.    Past psychiatric history: See HPI    Past medical history:  Diagnoses: Obesity, GERD, PCOS, vomiting  Medications:   Pepcid 20 mg twice a day, Effexor XR 75 mg daily, Geodon 20 mg IM daily  Medication allergies: Tylenol.    Social history: Noncontributory    Family history: Noncontributory    Substance abuse history:  Daily marijuana use.  Occasional alcohol use.  Denies illicit drug use.    Vital Signs    Temp:  98.4  Heart Rate:  112  Resp:  16  BP:  144/89    Mental Status Exam: The patient is found sitting in bed.  She is awake and alert.  She is fully oriented.  She is wearing hospital gown.  She is wearing a mask.  She is in no acute distress.  Speech is fluent with normal tone and volume.  Mood described as being anxious and depressed.  Affect is congruent.  She denies any acute suicidal ideation, homicidal ideation or audiovisual hallucinations.  Thought processes are mildly circumstantial.  Judgment insight is okay.  Memory is intact.    Assessment:   Major depressive disorder, moderate, recurrent;  Cannabis Use Disorder    Treatment Plan:     Patient does not desire any change in her psychiatric medications.  She denies any suicidal ideation therefore I will discontinue the sitter.  She does not required any further psychiatric treatment at this time.  She may follow-up with community mental health services.  Recommend cessation of marijuana.    Thank you for this consultation.  Please contact access for any additional requests.

## 2020-07-19 NOTE — PROGRESS NOTES
[facility] note.    Chart reviewed. Pt was seen by Psychiatrist Dr. Jayro Vivas earlier this day. See his note dated 7/19/2020.  As indicated in Dr. Vivas documentation, Pt is uninterested in changes to her psychotropic medications and Pt requires no further inpt Psychiatric follow up.     Access to sign off.

## 2020-07-19 NOTE — PLAN OF CARE
Problem: Suicide Risk (Adult)  Goal: Identify Related Risk Factors and Signs and Symptoms  7/19/2020 1010 by Cira Fiore, RN  Outcome: Ongoing (interventions implemented as appropriate)  7/19/2020 1006 by Cira Fiore, RN  Outcome: Ongoing (interventions implemented as appropriate)  Flowsheets (Taken 7/19/2020 1006)  Related Risk Factors (Suicide Risk): multiple stressors  Signs and Symptoms (Suicide Risk): anxiety; loneliness   Calling out, yelling about pain and nausea dry heaving-phlegm with occasional small amts emesis- loudly screaming I need meds for pain. Calling out for pain med q 5-10 mins.  Behavior can be heard throughout unit. Each time answered call bell in person and explained no narc was ordered.  During the night called meliza gomez, many times, who spoke with md,  who explained no narcs will be ordered.  Spoke with pt many times and explained this.  Consulted with  and access center.  Both came and spoke with pt.  Pt expressed suicidal ideation to access center who obtained 1:1 observation. Geodon ordered and given. After ekg done. Still asking everyone who enters her room for pain med.  Dr manrique vs with pt around 0730 and explained that all test were negative and no narcs marisa be ordered when nothing can be found medically wrong. Pt expressed her home situation with md with [facility] Mcclelland and myself in room

## 2020-07-20 ENCOUNTER — OFFICE VISIT (OUTPATIENT)
Dept: GASTROENTEROLOGY | Facility: CLINIC | Age: 25
End: 2020-07-20

## 2020-07-20 ENCOUNTER — READMISSION MANAGEMENT (OUTPATIENT)
Dept: CALL CENTER | Facility: HOSPITAL | Age: 25
End: 2020-07-20

## 2020-07-20 VITALS
HEART RATE: 89 BPM | DIASTOLIC BLOOD PRESSURE: 107 MMHG | SYSTOLIC BLOOD PRESSURE: 152 MMHG | OXYGEN SATURATION: 98 % | WEIGHT: 215.1 LBS | RESPIRATION RATE: 16 BRPM | TEMPERATURE: 98.8 F | BODY MASS INDEX: 36.72 KG/M2 | HEIGHT: 64 IN

## 2020-07-20 VITALS — TEMPERATURE: 99.7 F | HEIGHT: 64 IN | WEIGHT: 211.8 LBS | BODY MASS INDEX: 36.16 KG/M2

## 2020-07-20 DIAGNOSIS — K21.9 GASTROESOPHAGEAL REFLUX DISEASE, ESOPHAGITIS PRESENCE NOT SPECIFIED: ICD-10-CM

## 2020-07-20 DIAGNOSIS — R11.2 NON-INTRACTABLE VOMITING WITH NAUSEA: Primary | ICD-10-CM

## 2020-07-20 DIAGNOSIS — D64.9 ANEMIA, UNSPECIFIED TYPE: ICD-10-CM

## 2020-07-20 LAB — POTASSIUM SERPL-SCNC: 3.5 MMOL/L (ref 3.5–5.2)

## 2020-07-20 PROCEDURE — 99214 OFFICE O/P EST MOD 30 MIN: CPT | Performed by: NURSE PRACTITIONER

## 2020-07-20 PROCEDURE — 25010000002 ZIPRASIDONE MESYLATE PER 10 MG: Performed by: NURSE PRACTITIONER

## 2020-07-20 PROCEDURE — 63710000001 ONDANSETRON PER 8 MG: Performed by: NURSE PRACTITIONER

## 2020-07-20 PROCEDURE — 84132 ASSAY OF SERUM POTASSIUM: CPT | Performed by: HOSPITALIST

## 2020-07-20 PROCEDURE — 96372 THER/PROPH/DIAG INJ SC/IM: CPT

## 2020-07-20 PROCEDURE — G0378 HOSPITAL OBSERVATION PER HR: HCPCS

## 2020-07-20 RX ORDER — CALCIUM CARBONATE 200(500)MG
1 TABLET,CHEWABLE ORAL DAILY
COMMUNITY

## 2020-07-20 RX ADMIN — VENLAFAXINE HYDROCHLORIDE 75 MG: 75 CAPSULE, EXTENDED RELEASE ORAL at 09:22

## 2020-07-20 RX ADMIN — ONDANSETRON HYDROCHLORIDE 4 MG: 4 TABLET, FILM COATED ORAL at 07:51

## 2020-07-20 RX ADMIN — ZIPRASIDONE MESYLATE 20 MG: 20 INJECTION, POWDER, LYOPHILIZED, FOR SOLUTION INTRAMUSCULAR at 00:20

## 2020-07-20 RX ADMIN — FAMOTIDINE 20 MG: 20 TABLET, FILM COATED ORAL at 06:41

## 2020-07-20 RX ADMIN — ALUMINUM HYDROXIDE, MAGNESIUM HYDROXIDE, AND DIMETHICONE 15 ML: 400; 400; 40 SUSPENSION ORAL at 03:42

## 2020-07-20 NOTE — OUTREACH NOTE
Prep Survey      Responses   [facility] patient discharged from?  [facility]   Is LACE score < 7 ?  No   Eligibility  [facility]   Date of Admission  07/17/20   Date of Discharge  07/20/20   Discharge Disposition  Home or Self Care   Discharge diagnosis  intractable vomiting   COVID-19 Test Status  Not tested   Does the patient have one of the following disease processes/diagnoses(primary or secondary)?  Other   Does the patient have Home health ordered?  No   Is there a DME ordered?  No   Prep survey completed?  Yes          Rufina Somers RN

## 2020-07-20 NOTE — DISCHARGE SUMMARY
[facility]    Date of Discharge:  7/20/2020    PCP: Arian Barnard APRN    Discharge Diagnosis:   Active Hospital Problems    Diagnosis  POA   • **Intractable vomiting [R11.10]  Yes   • Obesity (BMI 30-39.9) [E66.9]  Yes   • GERD (gastroesophageal reflux disease) [K21.9]  Yes   • Anxiety [F41.9]  Yes   • Asthma [J45.909]  Yes   • Tobacco abuse [Z72.0]  Yes   • Hypokalemia [E87.6]  Yes   • PCOS (polycystic ovarian syndrome) [E28.2]  Yes      Resolved Hospital Problems   No resolved problems to display.     Procedures Performed       Consults     Date and Time Order Name Status Description    7/19/2020 0882 Inpatient Psychiatrist Consult Completed     7/17/2020 8919 Inpatient Gastroenterology Consult Completed     7/17/2020 7047 [facility] (on-call MD unless specified) Details Completed         Hospital Course  Please see discharge summary from 7/18/2020.  Right at the time of talk of discharge patient then started develop back with her emesis.  Again I think the patient was medically stable for discharge as all testing is negative and I think her nausea and vomiting is linked to underlying psychiatric stressors in her want to not go home.  She states she has had significant financial stressors and there is no air-conditioning in her home.  Regardless she is still deemed stable from discharge from GI perspective and I completely agree.  I did consult psychiatry while here over the last 24 hours and they have discontinued any bedside sitter and states the patient is stable for discharge as well from their perspective.  Patient can go back on her Seroquel once discharged and they recommend outpatient services.  Her blood pressure is falsely elevated secondary to her mood.  No additional medications will be utilized.  I think patient needs aggressive outpatient psychiatric help but otherwise does not require inpatient services.  Patient is very pleasant, there are  concerns for additional seeking behavior for certain IV medications.      Condition on Discharge: Improved.     Temp:  [98 °F (36.7 °C)-98.8 °F (37.1 °C)] 98.8 °F (37.1 °C)  Heart Rate:  [] 89  Resp:  [16] 16  BP: (147-155)/() 152/107  Body mass index is 36.92 kg/m².    Physical Exam   Constitutional: She is oriented to person, place, and time. She appears well-developed.   HENT:   Head: Normocephalic.   Eyes: Conjunctivae are normal. No scleral icterus.   Neck: Neck supple. No JVD present.   Cardiovascular: Normal rate and regular rhythm.   Pulmonary/Chest: Effort normal and breath sounds normal. No respiratory distress.   Abdominal: Soft. Bowel sounds are normal. She exhibits no distension. There is no tenderness. There is no guarding.   Musculoskeletal: She exhibits no edema.   Neurological: She is alert and oriented to person, place, and time.        Discharge Medications      New Medications      Instructions Start Date   famotidine 20 MG tablet  Commonly known as:  PEPCID   20 mg, Oral, 2 Times Daily Before Meals         Changes to Medications      Instructions Start Date   promethazine 25 MG tablet  Commonly known as:  PHENERGAN  What changed:  Another medication with the same name was removed. Continue taking this medication, and follow the directions you see here.   25 mg, Oral, Every 6 Hours PRN         Continue These Medications      Instructions Start Date   albuterol sulfate  (90 Base) MCG/ACT inhaler  Commonly known as:  PROVENTIL HFA;VENTOLIN HFA;PROAIR HFA   2 puffs, Inhalation, Every 6 Hours PRN      ondansetron ODT 4 MG disintegrating tablet  Commonly known as:  Zofran ODT   4 mg, Translingual, Every 8 Hours PRN      PROBIOTIC-10 PO   1 tablet, Oral, Daily      QUEtiapine 50 MG tablet  Commonly known as:  SEROquel   150 mg, Oral, Nightly      venlafaxine XR 75 MG 24 hr capsule  Commonly known as:  EFFEXOR-XR   75 mg, Oral, Daily         Stop These Medications    pantoprazole 40  MG EC tablet  Commonly known as:  PROTONIX     sucralfate 1 g tablet  Commonly known as:  CARAFATE     sucralfate 1 GM/10ML suspension  Commonly known as:  CARAFATE             Additional Instructions for the Follow-ups that You Need to Schedule     Discharge Follow-up with PCP   As directed       Currently Documented PCP:    Arian Barnard APRN    PCP Phone Number:    [phone]     Follow Up Details:  PCP 2 to 3 weeks.  GI per the recommendations         Discharge Follow-up with PCP   As directed       Currently Documented PCP:    Arian Barnard APRN    PCP Phone Number:    [phone]     Follow Up Details:  PCP 1 to 2 weeks.  GI/psych per the recommendations           Follow-up Information     Arian Barnard APRN .    Specialties:  Family Medicine, Nurse Practitioner  Why:  PCP 2 to 3 weeks.  GI per the recommendations  Contact information:  [address] [address] [phone]             Arian Barnard APRN .    Specialties:  Family Medicine, Nurse Practitioner  Why:  PCP 1 to 2 weeks.  GI/psych per the recommendations  Contact information:  [address] [address] [phone]                 Test Results Pending at Discharge     Chris Shultz MD  07/20/20  09:26    Discharge time spent greater than 30 minutes.

## 2020-07-20 NOTE — NURSING NOTE
Appointment was made for patient before she left to follow up with Arian [facility] (family medicine) for 7/28/2020 at 1:00pm.

## 2020-07-20 NOTE — PROGRESS NOTES
Chief Complaint   Patient presents with   • Hospital follow up     HPI    Alicia Powell is a  24 y.o. female here for a follow up visit for intractable vomiting, hospital follow-up.  Established patient Dr. Morrison's, new to me. Patient discharged from [facility] 3 hours ago.  Discharge summary reviewed as follows:    Hospital Course  Please see discharge summary from 7/18/2020.  Right at the time of talk of discharge patient then started develop back with her emesis.  Again I think the patient was medically stable for discharge as all testing is negative and I think her nausea and vomiting is linked to underlying psychiatric stressors in her want to not go home.  She states she has had significant financial stressors and there is no air-conditioning in her home.  Regardless she is still deemed stable from discharge from GI perspective and I completely agree.  I did consult psychiatry while here over the last 24 hours and they have discontinued any bedside sitter and states the patient is stable for discharge as well from their perspective.  Patient can go back on her Seroquel once discharged and they recommend outpatient services.  Her blood pressure is falsely elevated secondary to her mood.  No additional medications will be utilized.  I think patient needs aggressive outpatient psychiatric help but otherwise does not require inpatient services.  Patient is very pleasant, there are concerns for additional seeking behavior for certain IV medications.    Review GI notes, suspected component of GERD plus cannabis hyperemesis syndrome.  Microcytic anemia, patient reports heavy menses.    Currently she is doing well.  She denies nausea, vomiting, abdominal pain.  She plans to take Pepcid twice daily.  She is going to use Zofran as needed.  She plans to avoid NSAIDs and marijuana.  No diarrhea, constipation, rectal bleeding.    Normal EGD in 2018.    Past Medical History:   Diagnosis Date   • Abnormal uterine  bleeding (AUB) 5/6/2016   • Anemia    • Anxiety    • ASCUS of cervix with negative high risk HPV 8/1/2018   • Asthma    • Depression    • Dizziness    • GERD (gastroesophageal reflux disease)    • H/O ETOH abuse    • Multiple URI    • PCOS (polycystic ovarian syndrome) 5/6/2016   • PCOS (polycystic ovarian syndrome)    • Pneumonia    • Tobacco abuse 7/17/2020   • Viral infection        Past Surgical History:   Procedure Laterality Date   • DENTAL PROCEDURE     • ENDOSCOPY N/A 7/14/2018    Procedure: ESOPHAGOGASTRODUODENOSCOPY WITH COLD BIOPSIES;  Surgeon: Damon Morrison MD;  [facility] ENDOSCOPY;  Service: Gastroenterology   • VAGINAL SEPTUM RESECTION  2012    Excision   • WISDOM TOOTH EXTRACTION  02/2016       Scheduled Meds:  Outpatient Encounter Medications as of 7/20/2020   Medication Sig Dispense Refill   • albuterol sulfate  (90 Base) MCG/ACT inhaler Inhale 2 puffs Every 6 (Six) Hours As Needed for Wheezing or Shortness of Air. 6.7 g 0   • calcium carbonate (TUMS) 500 MG chewable tablet Chew 1 tablet Daily.     • famotidine (PEPCID) 20 MG tablet Take 1 tablet by mouth 2 (Two) Times a Day Before Meals.     • ondansetron ODT (Zofran ODT) 4 MG disintegrating tablet Place 1 tablet on the tongue Every 8 (Eight) Hours As Needed for Nausea or Vomiting. 15 tablet 0   • Probiotic Product (PROBIOTIC-10 PO) Take 1 tablet by mouth Daily.     • promethazine (PHENERGAN) 25 MG tablet Take 1 tablet by mouth Every 6 (Six) Hours As Needed for Nausea or Vomiting. 12 tablet 0   • QUEtiapine (SEROquel) 50 MG tablet Take 3 tablets by mouth Every Night. 90 tablet 5   • venlafaxine XR (EFFEXOR-XR) 75 MG 24 hr capsule Take 1 capsule by mouth Daily. 30 capsule 5   • [DISCONTINUED] pantoprazole (PROTONIX) 40 MG EC tablet Take 1 tablet by mouth Daily. 21 tablet 0   • [DISCONTINUED] promethazine (PHENERGAN) 25 MG suppository Insert 1 suppository into the rectum Every 6 (Six) Hours As Needed for Nausea or Vomiting. 24  suppository 0   • [DISCONTINUED] sucralfate (CARAFATE) 1 g tablet Take 1 tablet by mouth 4 (Four) Times a Day. 28 tablet 0   • [DISCONTINUED] sucralfate (CARAFATE) 1 GM/10ML suspension Take 10 mL by mouth 4 (Four) Times a Day With Meals & at Bedtime. 420 mL 0     Facility-Administered Encounter Medications as of 7/20/2020   Medication Dose Route Frequency Provider Last Rate Last Dose   • cyanocobalamin injection 1,000 mcg  1,000 mcg Intramuscular Q28 Days Dania Alston PA   1,000 mcg at 12/18/18 1440   • [DISCONTINUED] acetaminophen (TYLENOL) tablet 650 mg  650 mg Oral Q4H PRN Olivia Barclay APRN       • [DISCONTINUED] albuterol (PROVENTIL) nebulizer solution 0.083% 2.5 mg/3mL  2.5 mg Nebulization Q6H PRN Olivia Barclay APRN       • [DISCONTINUED] aluminum-magnesium hydroxide-simethicone (MAALOX MAX) 400-400-40 MG/5ML suspension 15 mL  15 mL Oral Q6H PRN Olivia Barclay APRN   15 mL at 07/20/20 0342   • [DISCONTINUED] bisacodyl (DULCOLAX) EC tablet 5 mg  5 mg Oral Daily PRN Olivia Barclay APRN       • [DISCONTINUED] bisacodyl (DULCOLAX) suppository 10 mg  10 mg Rectal Daily PRN Olivia Barclay APRN       • [DISCONTINUED] famotidine (PEPCID) tablet 20 mg  20 mg Oral BID Chris Hensley MD   20 mg at 07/20/20 0641   • [DISCONTINUED] LORazepam (ATIVAN) injection 0.5 mg  0.5 mg Intravenous Once Kirstin Lancaster APRN       • [DISCONTINUED] ondansetron (ZOFRAN) injection 4 mg  4 mg Intravenous Q6H PRN Olivia Barclay APRN   4 mg at 07/19/20 0125   • [DISCONTINUED] ondansetron (ZOFRAN) tablet 4 mg  4 mg Oral Q6H PRN Olivia Barclay APRN   4 mg at 07/20/20 0751   • [DISCONTINUED] promethazine (PHENERGAN) suppository 25 mg  25 mg Rectal Q6H PRN Olivia Barclay, APRN       • [DISCONTINUED] sodium chloride 0.9 % flush 10 mL  10 mL Intravenous PRN Micah Will MD       • [DISCONTINUED] sodium chloride 0.9 % flush 10 mL  10 mL Intravenous PRN Olivia Barclay, APRN        • [DISCONTINUED] sodium chloride 0.9 % with KCl 20 mEq/L infusion  100 mL/hr Intravenous Continuous Olivia Barclay APRN 100 mL/hr at 07/19/20 1701 100 mL/hr at 07/19/20 1701   • [DISCONTINUED] venlafaxine XR (EFFEXOR-XR) 24 hr capsule 75 mg  75 mg Oral Daily Olivia Barclay APRN   75 mg at 07/20/20 0922   • [DISCONTINUED] ziprasidone (GEODON) injection 10 mg  10 mg Intramuscular Daily With Breakfast Chris Shultz MD       • [DISCONTINUED] ziprasidone (GEODON) injection 20 mg  20 mg Intramuscular Q4H PRN Olivia Barclay APRN   20 mg at 07/20/20 0020   • [DISCONTINUED] ziprasidone (GEODON) injection 20 mg  20 mg Intramuscular Daily With Dinner Chris Shultz MD   20 mg at 07/19/20 1801       Continuous Infusions:     PRN Meds:.    Allergies   Allergen Reactions   • Acetaminophen Nausea And Vomiting       Social History     Socioeconomic History   • Marital status: Single     Spouse name: Not on file   • Number of children: 0   • Years of education: Not on file   • Highest education level: Not on file   Occupational History   • Occupation: unemployed   Tobacco Use   • Smoking status: Current Some Day Smoker     Types: Cigars   • Smokeless tobacco: Never Used   • Tobacco comment: infreq   Substance and Sexual Activity   • Alcohol use: Yes     Frequency: 2-4 times a month     Drinks per session: 3 or 4     Comment: social    • Drug use: Yes     Types: Marijuana   • Sexual activity: Yes     Partners: Male   Social History Narrative    Last Pelvic/PAP 2016       Family History   Problem Relation Age of Onset   • Anxiety disorder Mother    • Depression Mother    • CALDERON disease Mother    • Asthma Mother    • Mental illness Mother    • Hypertension Maternal Grandmother    • Hyperlipidemia Maternal Grandmother    • Other Maternal Grandmother         GERD   • Arthritis Maternal Grandmother    • COPD Maternal Grandmother    • Depression Maternal Grandmother    • Heart disease Maternal Grandmother     • Mental illness Maternal Grandmother    • CALDERON disease Father    • Pancreatitis Father    • Alcohol abuse Father    • Asthma Father    • Heart disease Maternal Grandfather    • Heart disease Paternal Grandfather    • Mental retardation Maternal Uncle    • Breast cancer Neg Hx    • Ovarian cancer Neg Hx    • Uterine cancer Neg Hx    • Colon cancer Neg Hx        Review of Systems   Constitutional: Negative for activity change, appetite change, fatigue, fever and unexpected weight change.   HENT: Negative for trouble swallowing.    Respiratory: Negative for apnea, cough, choking, chest tightness, shortness of breath and wheezing.    Cardiovascular: Negative for chest pain, palpitations and leg swelling.   Gastrointestinal: Negative for abdominal distention, abdominal pain, anal bleeding, blood in stool, constipation, diarrhea, nausea, rectal pain and vomiting.       Vitals:    07/20/20 1320   Temp: 99.7 °F (37.6 °C)       Physical Exam   Constitutional: She is oriented to person, place, and time. She appears well-developed and well-nourished.   Eyes: Pupils are equal, round, and reactive to light.   Cardiovascular: Normal rate, regular rhythm and normal heart sounds.   Pulmonary/Chest: Effort normal and breath sounds normal. No respiratory distress. She has no wheezes.   Abdominal: Soft. Bowel sounds are normal. She exhibits no distension and no mass. There is no tenderness. There is no guarding. No hernia.   Musculoskeletal: Normal range of motion.   Neurological: She is alert and oriented to person, place, and time.   Skin: Skin is warm and dry. Capillary refill takes less than 2 seconds.   Psychiatric: She has a normal mood and affect. Her behavior is normal.       Ct Abdomen Pelvis Without Contrast    Result Date: 7/18/2020  No evidence for acute abnormality in the abdomen or pelvis. No renal stone or hydronephrosis.  Radiation dose reduction techniques were utilized, including automated exposure control and  exposure modulation based on body size.  This report was finalized on 7/18/2020 2:40 PM by Dr. Roman Cano M.D.      Us Gallbladder    Result Date: 7/15/2020  Normal right upper quadrant ultrasound.  This report was finalized on 7/15/2020 4:50 AM by Dr. Ysabel Renee M.D.        Alicia was seen today for hospital follow up.    Diagnoses and all orders for this visit:    Non-intractable vomiting with nausea    Gastroesophageal reflux disease, esophagitis presence not specified    Anemia, unspecified type      Assessment/plan    Pleasant 24-year-old female seen today in follow-up discharge from [facility] 3 hours ago for vomiting.  Discharge summary reviewed as above.  Follow discharge recommendations.  Continue PPI therapy twice daily dosing.  Avoid marijuana.  Avoid NSAIDs.  Continue with bland diet.  Return to clinic 4 weeks.  Labs at next office visit to reassess anemia and check iron stores.

## 2020-07-20 NOTE — PROGRESS NOTES
Case Management Discharge Note      Final Note: Discharged home. Mary Roche, SARA              Transportation Services  Private: Car    Final Discharge Disposition Code: 01 - home or self-care

## 2020-07-20 NOTE — PAYOR COMM NOTE
"Lisandra Chahal (24 y.o. Female)     CLINICAL FOR OBS AUTH.  ID # [id number]      MARK ANTHONY MARTINO# [phone]  F# [phone]    Date of Birth Social Security Number Address Home Phone MRN    1995  [address] [facility] [address] [phone] [id number]    [employer] Marital Status          None Single       Admission Date Admission Type Admitting Provider Attending Provider Department, Room/Bed    7/17/20 Emergency Toby Amin MD Masden, Troy Andrew, MD [address], 99/[address]    Discharge Date Discharge Disposition Discharge Destination         Home or Self Care              Attending Provider:  Chris Shultz MD    Allergies:  Acetaminophen    Isolation:  None   Infection:  None   Code Status:  CPR    Ht:  162.6 cm (64\")   Wt:  97.6 kg (215 lb 1.6 oz)    Admission Cmt:  None   Principal Problem:  Intractable vomiting [R11.10]                 Active Insurance as of 7/17/2020     Primary Coverage     Payor Plan Insurance Group Employer/Plan Group    [employer] [place] [employer] [place]ICA[place]      Payor Plan Address Payor Plan Phone Number Payor Plan Fax Number Effective Dates    [address] [address] [phone]  6/27/2018 - None Entered    [place] [address]       Subscriber Name Subscriber Birth Date Member ID       LISANDRA CHAHAL 1995 [id number]                 Emergency Contacts      (Rel.) Home Phone Work Phone Mobile Phone    Sonja Gleason (Mother) [phone] -- --               History & Physical      Olivia Barclay APRN at 07/17/20 2340     Attestation signed by Chris Shultz MD at 07/18/20 0976    Reviewed and agree with documentation per NP    Billing per NP                      Patient Name:  Lisandra Chahal  YOB: 1995  MRN:  [id number]  Admit Date:  7/17/2020  Patient Care Team:  Arian Barnard APRN as PCP - General (Family Medicine)      Subjective   History Present Illness     Chief Complaint   Patient " presents with   • Abdominal Pain   • Vomiting     History of Present Illness   Ms. Powell is a 24 y.o. smoker with a history of GERD, anxiety, depression, asthma, obesity, and PCOS that presents to [facility] complaining of abdominal pain, nausea, and vomiting for the past 5 days.  Patient reports intermittent, epigastric pain that radiates to her chest and upper collarbone.  She also states she noticed some blood in her vomitus.  She has been seen 4 times in the ED in the last 5 days including last night where she was discharged home on Carafate, Phenergan suppository, and a PPI was sent home.  She states her symptoms did not improve with the medication so she decided to come back for further evaluation.  She does admit to smoking marijuana with the last use being today.  She states that she has tried a warm shower which made her feel somewhat better, but her symptoms return.  She states that her last EGD was several years ago.  She has an appointment scheduled with Dr. Morrison of GI next week.  Work-up in the emergency department has been unremarkable.    Review of Systems   Constitutional: Positive for chills. Negative for fever.   HENT: Negative for congestion and rhinorrhea.    Eyes: Negative for photophobia and visual disturbance.   Respiratory: Negative for cough and shortness of breath.    Cardiovascular: Negative for chest pain and palpitations.   Gastrointestinal: Positive for abdominal pain, nausea and vomiting. Negative for constipation and diarrhea.   Endocrine: Negative for cold intolerance and heat intolerance.   Genitourinary: Negative for difficulty urinating and dysuria.   Musculoskeletal: Negative for gait problem and joint swelling.   Skin: Negative for rash and wound.   Neurological: Positive for dizziness, light-headedness and headaches.   Psychiatric/Behavioral: Negative for sleep disturbance and suicidal ideas.        Personal History     Past Medical History:   Diagnosis Date     • Abnormal uterine bleeding (AUB) 5/6/2016   • Anemia    • Anxiety    • ASCUS of cervix with negative high risk HPV 8/1/2018   • Asthma    • Depression    • Dizziness    • GERD (gastroesophageal reflux disease)    • H/O ETOH abuse    • Multiple URI    • PCOS (polycystic ovarian syndrome) 5/6/2016   • PCOS (polycystic ovarian syndrome)    • Pneumonia    • Tobacco abuse 7/17/2020   • Viral infection      Past Surgical History:   Procedure Laterality Date   • DENTAL PROCEDURE     • ENDOSCOPY N/A 7/14/2018    Procedure: ESOPHAGOGASTRODUODENOSCOPY WITH COLD BIOPSIES;  Surgeon: Damon Morrison MD;  Location: [facility] ENDOSCOPY;  Service: Gastroenterology   • VAGINAL SEPTUM RESECTION  2012    Excision   • WISDOM TOOTH EXTRACTION  02/2016     Family History   Problem Relation Age of Onset   • Anxiety disorder Mother    • Depression Mother    • CALDERON disease Mother    • Asthma Mother    • Mental illness Mother    • Hypertension Maternal Grandmother    • Hyperlipidemia Maternal Grandmother    • Other Maternal Grandmother         GERD   • Arthritis Maternal Grandmother    • COPD Maternal Grandmother    • Depression Maternal Grandmother    • Heart disease Maternal Grandmother    • Mental illness Maternal Grandmother    • CALDERON disease Father    • Pancreatitis Father    • Alcohol abuse Father    • Asthma Father    • Heart disease Maternal Grandfather    • Heart disease Paternal Grandfather    • Mental retardation Maternal Uncle    • Breast cancer Neg Hx    • Ovarian cancer Neg Hx    • Uterine cancer Neg Hx    • Colon cancer Neg Hx      Social History     Tobacco Use   • Smoking status: Current Some Day Smoker     Types: Cigars   • Smokeless tobacco: Never Used   • Tobacco comment: infreq   Substance Use Topics   • Alcohol use: Yes     Frequency: 2-4 times a month     Drinks per session: 3 or 4     Comment: 3 to 4 drinks per month    • Drug use: Yes     Types: Marijuana     Comment: daily     No current facility-administered  medications on file prior to encounter.      Current Outpatient Medications on File Prior to Encounter   Medication Sig Dispense Refill   • albuterol sulfate  (90 Base) MCG/ACT inhaler Inhale 2 puffs Every 6 (Six) Hours As Needed for Wheezing or Shortness of Air. 6.7 g 0   • ondansetron ODT (Zofran ODT) 4 MG disintegrating tablet Place 1 tablet on the tongue Every 8 (Eight) Hours As Needed for Nausea or Vomiting. 15 tablet 0   • pantoprazole (PROTONIX) 40 MG EC tablet Take 1 tablet by mouth Daily. 21 tablet 0   • Probiotic Product (PROBIOTIC-10 PO) Take 1 tablet by mouth Daily.     • promethazine (PHENERGAN) 25 MG suppository Insert 1 suppository into the rectum Every 6 (Six) Hours As Needed for Nausea or Vomiting. 24 suppository 0   • promethazine (PHENERGAN) 25 MG tablet Take 1 tablet by mouth Every 6 (Six) Hours As Needed for Nausea or Vomiting. 12 tablet 0   • QUEtiapine (SEROquel) 50 MG tablet Take 3 tablets by mouth Every Night. 90 tablet 5   • sucralfate (CARAFATE) 1 g tablet Take 1 tablet by mouth 4 (Four) Times a Day. 28 tablet 0   • sucralfate (CARAFATE) 1 GM/10ML suspension Take 10 mL by mouth 4 (Four) Times a Day With Meals & at Bedtime. 420 mL 0   • venlafaxine XR (EFFEXOR-XR) 75 MG 24 hr capsule Take 1 capsule by mouth Daily. 30 capsule 5     Allergies   Allergen Reactions   • Acetaminophen Nausea And Vomiting       Objective    Objective     Vital Signs  Temp:  [98.5 °F (36.9 °C)-100 °F (37.8 °C)] 100 °F (37.8 °C)  Heart Rate:  [86-89] 86  Resp:  [18] 18  BP: (156-169)/(92-99) 165/99  SpO2:  [100 %] 100 %  on   ;   Device (Oxygen Therapy): room air  Body mass index is 36.92 kg/m².    Physical Exam   Constitutional: She is oriented to person, place, and time. She appears well-developed and well-nourished.  Non-toxic appearance. No distress.   HENT:   Head: Normocephalic and atraumatic.   Eyes: Conjunctivae and EOM are normal. Right eye exhibits no discharge. Left eye exhibits no discharge. No  scleral icterus.   Neck: Normal range of motion. Neck supple. No JVD present.   Cardiovascular: Normal rate, regular rhythm and normal heart sounds. Exam reveals no gallop and no friction rub.   No murmur heard.  Pulmonary/Chest: Effort normal and breath sounds normal. No respiratory distress. She has no wheezes. She has no rales.   Abdominal: Soft. Bowel sounds are normal. She exhibits no distension. There is tenderness. There is no guarding.   Musculoskeletal: Normal range of motion. She exhibits no edema, tenderness or deformity.   Neurological: She is alert and oriented to person, place, and time.   Skin: Skin is warm and dry. Capillary refill takes less than 2 seconds.   Psychiatric: She has a normal mood and affect. Her behavior is normal.       Results Review:  I reviewed the patient's new clinical results.  I reviewed the patient's new imaging results and agree with the interpretation.  I reviewed the patient's other test results and agree with the interpretation  I personally viewed and interpreted the patient's EKG/Telemetry data  Discussed with ED provider.    Lab Results (last 24 hours)     Procedure Component Value Units Date/Time    Urinalysis With Microscopic If Indicated (No Culture) - Urine, Clean Catch [[id number]]  (Abnormal) Collected:  07/17/20 1943    Specimen:  Urine, Clean Catch Updated:  07/17/20 2040     Color, UA Yellow     Appearance, UA Cloudy     pH, UA 7.5     Specific Gravity, UA >=1.030     Glucose, UA Negative     Ketones, UA 80 mg/dL (3+)     Bilirubin, UA Negative     Blood, UA Negative     Protein, UA 30 mg/dL (1+)     Leuk Esterase, UA Negative     Nitrite, UA Negative     Urobilinogen, UA 0.2 E.U./dL    Urinalysis, Microscopic Only - Urine, Clean Catch [[id number]]  (Abnormal) Collected:  07/17/20 1943    Specimen:  Urine, Clean Catch Updated:  07/17/20 2043     RBC, UA None Seen /HPF      WBC, UA None Seen /HPF      Bacteria, UA 2+ /HPF      Squamous Epithelial Cells, UA 3-6  /HPF      Hyaline Casts, UA None Seen /LPF      Methodology Manual Light Microscopy    CBC & Differential [[id number]] Collected:  07/17/20 1954    Specimen:  Blood Updated:  07/17/20 2024    Narrative:       The following orders were created for panel order CBC & Differential.  Procedure                               Abnormality         Status                     ---------                               -----------         ------                     CBC Auto Differential[[id number]]        Abnormal            Final result                 Please view results for these tests on the individual orders.    Comprehensive Metabolic Panel [[id number]]  (Abnormal) Collected:  07/17/20 1954    Specimen:  Blood Updated:  07/17/20 2036     Glucose 116 mg/dL      BUN 10 mg/dL      Creatinine 0.83 mg/dL      Sodium 143 mmol/L      Potassium 3.3 mmol/L      Chloride 105 mmol/L      CO2 24.2 mmol/L      Calcium 9.6 mg/dL      Total Protein 8.0 g/dL      Albumin 5.00 g/dL      ALT (SGPT) 14 U/L      AST (SGOT) 17 U/L      Alkaline Phosphatase 67 U/L      Total Bilirubin 0.5 mg/dL      eGFR Non African Amer 85 mL/min/1.73      Globulin 3.0 gm/dL      A/G Ratio 1.7 g/dL      BUN/Creatinine Ratio 12.0     Anion Gap 13.8 mmol/L     Narrative:       GFR Normal >60  Chronic Kidney Disease <60  Kidney Failure <15      Lipase [[id number]]  (Normal) Collected:  07/17/20 1954    Specimen:  Blood Updated:  07/17/20 2036     Lipase 27 U/L     hCG, Serum, Qualitative [[id number]]  (Normal) Collected:  07/17/20 1954    Specimen:  Blood Updated:  07/17/20 2029     HCG Qualitative Negative    CBC Auto Differential [[id number]]  (Abnormal) Collected:  07/17/20 1954    Specimen:  Blood Updated:  07/17/20 2024     WBC 10.23 10*3/mm3      RBC 4.73 10*6/mm3      Hemoglobin 10.5 g/dL      Hematocrit 34.6 %      MCV 73.2 fL      MCH 22.2 pg      MCHC 30.3 g/dL      RDW 16.9 %      RDW-SD 43.5 fl      MPV 10.2 fL      Platelets 456 10*3/mm3      Neutrophil %  87.4 %      Lymphocyte % 8.8 %      Monocyte % 3.2 %      Eosinophil % 0.0 %      Basophil % 0.3 %      Neutrophils, Absolute 8.94 10*3/mm3      Lymphocytes, Absolute 0.90 10*3/mm3      Monocytes, Absolute 0.33 10*3/mm3      Eosinophils, Absolute 0.00 10*3/mm3      Basophils, Absolute 0.03 10*3/mm3           Imaging Results (Last 24 Hours)     ** No results found for the last 24 hours. **          Results for orders placed in visit on 12/08/14   SCANNED - ECHOCARDIOGRAM       No orders to display        Assessment/Plan     Active Hospital Problems    Diagnosis  POA   • **Intractable vomiting [R11.10]  Yes   • Obesity (BMI 30-39.9) [E66.9]  Yes   • GERD (gastroesophageal reflux disease) [K21.9]  Yes   • Anxiety [F41.9]  Yes   • Asthma [J45.909]  Yes   • Tobacco abuse [Z72.0]  Yes   • Hypokalemia [E87.6]  Yes   • PCOS (polycystic ovarian syndrome) [E28.2]  Yes      Resolved Hospital Problems   No resolved problems to display.       Ms. Powell is a 24 y.o. smoker with a history of GERD, anxiety, depression, and asthma who presents with intractable nausea and vomiting.    Intractable nausea and vomiting  -Slight tenderness noted on abdominal exam, ultrasound of gallbladder okay.    -CT abdominal pelvis  -GI consult  -IV hydration overnight  -Antiemetics and pain medication PRN  -Continue Carafate and PPI  -N.p.o. after midnight    Anxiety/depression  -Continue home regimen    Tobacco abuse/marijuana use  -I had a discussion with the patient about stopping her marijuana use as this could be a contributing factor to her nausea.  -Encourage tobacco cessation  -Nicotine patch      I discussed the patient's findings and my recommendations with patient and ED provider.    VTE Prophylaxis - SCDs.  Code Status - Full code.       MARIBELL Hernandes  [facility]  07/18/20  11:40 PM    Electronically signed by Chris Shultz MD at 07/18/20 0938          Emergency Department Notes      Ross  Gail Parry, RN at 07/17/20 1845        Pt comes to ER for epigastric pain with nausea since the beginning of the week. Pt has been seen multiple times for the same and given multiple meds with no relief. Pt has appt with DR Morrison next week but doesn't think she can wait. Pt and RN wearing mask upon triage.       Electronically signed by Gail Hawkins RN at 07/17/20 1846     Micah Will MD at 07/17/20 2002           EMERGENCY DEPARTMENT ENCOUNTER    Room Number:  12/12  Date of encounter:  7/17/2020  PCP: Arian Barnard APRN  Historian: Patient     I used full protective equipment while examining this patient.  This includes face mask, gloves and protective eyewear.  I washed my hands before entering the room and immediately upon leaving the room.  Patient was wearing a surgical mask.      HPI:  Chief Complaint: Abdominal pain, nausea, vomiting  A complete HPI/ROS/PMH/PSH/SH/FH are unobtainable due to: None    Context: Alicia Powell is a 24 y.o. female who presents to the ED c/o nausea, vomiting, and epigastric pain for the past 4 to 5 days.  Pain is described as a burning sensation.  Patient has had multiple episodes of vomiting and states that she cannot keep anything down.  She has taken Zofran, Phenergan, and Carafate without relief.  Nothing makes her symptoms worse.  She has an appointment with Dr. Morrison in 3 days.  Patient does smoke marijuana regularly but states she has not used any since her symptoms began.  She denies fever, chest pain, shortness of breath, dysuria, flank pain, headache, dizziness, fainting, or abnormal vaginal bleeding.      PAST MEDICAL HISTORY  Active Ambulatory Problems     Diagnosis Date Noted   • PCOS (polycystic ovarian syndrome) 05/06/2016   • Hirsutism 05/06/2016   • Obesity 05/06/2016   • Nausea 07/13/2018   • Pain of upper abdomen 07/13/2018   • Non-intractable vomiting with nausea 07/13/2018     Resolved Ambulatory Problems     Diagnosis Date Noted   •  Abnormal uterine bleeding (AUB) 05/06/2016   • ASCUS of cervix with negative high risk HPV 08/01/2018     Past Medical History:   Diagnosis Date   • Anemia    • Anxiety    • Asthma    • Depression    • Dizziness    • GERD (gastroesophageal reflux disease)    • H/O ETOH abuse    • Multiple URI    • Pneumonia    • Viral infection          PAST SURGICAL HISTORY  Past Surgical History:   Procedure Laterality Date   • DENTAL PROCEDURE     • ENDOSCOPY N/A 7/14/2018    Procedure: ESOPHAGOGASTRODUODENOSCOPY WITH COLD BIOPSIES;  Surgeon: Damon Morrison MD;  Location[facility] ENDOSCOPY;  Service: Gastroenterology   • VAGINAL SEPTUM RESECTION  2012    Excision   • WISDOM TOOTH EXTRACTION  02/2016         FAMILY HISTORY  Family History   Problem Relation Age of Onset   • Anxiety disorder Mother    • Depression Mother    • CALDERON disease Mother    • Hypertension Maternal Grandmother    • Hyperlipidemia Maternal Grandmother    • Other Maternal Grandmother         GERD   • CALDERON disease Father    • Pancreatitis Father    • Heart disease Maternal Grandfather    • Heart disease Paternal Grandfather    • Breast cancer Neg Hx    • Ovarian cancer Neg Hx    • Uterine cancer Neg Hx    • Colon cancer Neg Hx          SOCIAL HISTORY  Social History     Socioeconomic History   • Marital status: Single     Spouse name: Not on file   • Number of children: 0   • Years of education: Not on file   • Highest education level: Not on file   Occupational History   • Occupation: unemployed   Tobacco Use   • Smoking status: Current Some Day Smoker     Types: Cigars   • Smokeless tobacco: Never Used   Substance and Sexual Activity   • Alcohol use: Yes     Frequency: 2-4 times a month     Drinks per session: 3 or 4     Comment: 3 to 4 drinks per month    • Drug use: Yes     Types: Marijuana     Comment: uses twice weekly    • Sexual activity: Yes     Partners: Male     Birth control/protection: Condom   Social History Narrative    Last Pelvic/PAP 2016                  ALLERGIES  Acetaminophen       REVIEW OF SYSTEMS  Review of Systems      All systems have been reviewed and are negative except as as discussed in the HPI    PHYSICAL EXAM    I have reviewed the triage vital signs and nursing notes.    ED Triage Vitals [07/17/20 1847]   Temp Heart Rate Resp BP SpO2   98.5 °F (36.9 °C) 89 18 169/92 100 %      Temp src Heart Rate Source Patient Position BP Location FiO2 (%)   -- -- -- -- --       Physical Exam  GENERAL: Awake, alert, appears uncomfortable  HENT: NCAT, nares patent, dry mucous membranes  NECK: supple, no lymphadenopathy  EYES: no scleral icterus  CV: regular rhythm, regular rate, no murmur  RESPIRATORY: normal effort, clear to auscultation bilaterally  ABDOMEN: soft, nontender, nondistended, no CVA tenderness  MUSCULOSKELETAL: Extremities are nontender and without obvious deformity.  There is normal range of motion in all extremities.  There is no calf tenderness or pedal edema  NEURO: Strength, sensation, and coordination are grossly intact.  Speech and mentation are unremarkable.  No facial droop.  SKIN: warm, dry, no rash  PSYCH: Normal mood and affect      LAB RESULTS  Recent Results (from the past 24 hour(s))   Urinalysis With Microscopic If Indicated (No Culture) - Urine, Clean Catch    Collection Time: 07/17/20  7:43 PM   Result Value Ref Range    Color, UA Yellow Yellow, Straw    Appearance, UA Cloudy (A) Clear    pH, UA 7.5 5.0 - 8.0    Specific Gravity, UA >=1.030 1.005 - 1.030    Glucose, UA Negative Negative    Ketones, UA 80 mg/dL (3+) (A) Negative    Bilirubin, UA Negative Negative    Blood, UA Negative Negative    Protein, UA 30 mg/dL (1+) (A) Negative    Leuk Esterase, UA Negative Negative    Nitrite, UA Negative Negative    Urobilinogen, UA 0.2 E.U./dL 0.2 - 1.0 E.U./dL   Urinalysis, Microscopic Only - Urine, Clean Catch    Collection Time: 07/17/20  7:43 PM   Result Value Ref Range    RBC, UA None Seen None Seen, 0-2 /HPF    WBC, UA None Seen  None Seen, 0-2 /HPF    Bacteria, UA 2+ (A) None Seen /HPF    Squamous Epithelial Cells, UA 3-6 (A) None Seen, 0-2 /HPF    Hyaline Casts, UA None Seen None Seen /LPF    Methodology Manual Light Microscopy    Comprehensive Metabolic Panel    Collection Time: 07/17/20  7:54 PM   Result Value Ref Range    Glucose 116 (H) 65 - 99 mg/dL    BUN 10 6 - 20 mg/dL    Creatinine 0.83 0.57 - 1.00 mg/dL    Sodium 143 136 - 145 mmol/L    Potassium 3.3 (L) 3.5 - 5.2 mmol/L    Chloride 105 98 - 107 mmol/L    CO2 24.2 22.0 - 29.0 mmol/L    Calcium 9.6 8.6 - 10.5 mg/dL    Total Protein 8.0 6.0 - 8.5 g/dL    Albumin 5.00 3.50 - 5.20 g/dL    ALT (SGPT) 14 1 - 33 U/L    AST (SGOT) 17 1 - 32 U/L    Alkaline Phosphatase 67 39 - 117 U/L    Total Bilirubin 0.5 0.0 - 1.2 mg/dL    eGFR Non African Amer 85 >60 mL/min/1.73    Globulin 3.0 gm/dL    A/G Ratio 1.7 g/dL    BUN/Creatinine Ratio 12.0 7.0 - 25.0    Anion Gap 13.8 5.0 - 15.0 mmol/L   Lipase    Collection Time: 07/17/20  7:54 PM   Result Value Ref Range    Lipase 27 13 - 60 U/L   hCG, Serum, Qualitative    Collection Time: 07/17/20  7:54 PM   Result Value Ref Range    HCG Qualitative Negative Negative   Light Blue Top    Collection Time: 07/17/20  7:54 PM   Result Value Ref Range    Extra Tube hold for add-on    CBC Auto Differential    Collection Time: 07/17/20  7:54 PM   Result Value Ref Range    WBC 10.23 3.40 - 10.80 10*3/mm3    RBC 4.73 3.77 - 5.28 10*6/mm3    Hemoglobin 10.5 (L) 12.0 - 15.9 g/dL    Hematocrit 34.6 34.0 - 46.6 %    MCV 73.2 (L) 79.0 - 97.0 fL    MCH 22.2 (L) 26.6 - 33.0 pg    MCHC 30.3 (L) 31.5 - 35.7 g/dL    RDW 16.9 (H) 12.3 - 15.4 %    RDW-SD 43.5 37.0 - 54.0 fl    MPV 10.2 6.0 - 12.0 fL    Platelets 456 (H) 140 - 450 10*3/mm3    Neutrophil % 87.4 (H) 42.7 - 76.0 %    Lymphocyte % 8.8 (L) 19.6 - 45.3 %    Monocyte % 3.2 (L) 5.0 - 12.0 %    Eosinophil % 0.0 (L) 0.3 - 6.2 %    Basophil % 0.3 0.0 - 1.5 %    Neutrophils, Absolute 8.94 (H) 1.70 - 7.00 10*3/mm3     Lymphocytes, Absolute 0.90 0.70 - 3.10 10*3/mm3    Monocytes, Absolute 0.33 0.10 - 0.90 10*3/mm3    Eosinophils, Absolute 0.00 0.00 - 0.40 10*3/mm3    Basophils, Absolute 0.03 0.00 - 0.20 10*3/mm3       Ordered the above labs and independently reviewed the results.      RADIOLOGY  No Radiology Exams Resulted Within Past 24 Hours    I ordered the above noted radiological studies. Reviewed by me and discussed with radiologist.  See dictation for official radiology interpretation.      PROCEDURES  Procedures      MEDICATIONS GIVEN IN ER    Medications   sodium chloride 0.9 % flush 10 mL (has no administration in time range)   metoclopramide (REGLAN) injection 10 mg (10 mg Intravenous Given 7/17/20 2009)   famotidine (PEPCID) injection 20 mg (20 mg Intravenous Given 7/17/20 2009)   HYDROmorphone (DILAUDID) injection 0.5 mg (0.5 mg Intravenous Given 7/17/20 2010)         PROGRESS, DATA ANALYSIS, CONSULTS, AND MEDICAL DECISION MAKING    All labs have been independently reviewed by me.  All radiology studies have been reviewed by me and discussed with radiologist dictating the report.   EKG's independently viewed and interpreted by me.  I have reviewed the nurse's notes, vital signs, past medical history, and medication list.  Discussion below represents my analysis of pertinent findings related to patient's condition, differential diagnosis, treatment plan and final disposition.    Differential diagnosis includes but is not limited to:  - hepatobiliary pathology such as cholecystitis, cholangitis, and symptomatic cholelithiasis  - Pancreatitis  - Dyspepsia  - Small bowel obstruction  - Appendicitis  - Diverticulitis  - UTI including pyelonephritis  - Ureteral stone  - Zoster  - Colitis, including infectious and ischemic  - Atypical ACS        ED Course as of Jul 17 2233 Fri Jul 17, 2020 1952 Old records reviewed.  This is the patient's fourth ER visit in the past 5 days for epigastric pain, nausea, and vomiting.  She  was last seen here yesterday.  Labs are unremarkable except for a potassium of 3.0, hemoglobin 9.7, and 40 ketones in the urine.  Gallbladder ultrasound done on 7/15 was normal.  CT abdomen/pelvis done in May 2020 showed a 3.8 cm left ovarian cyst but was otherwise unremarkable.  EGD done in July 2018 was normal.    []   2120 Test results discussed with the patient.  She states that she was feeling better but is still nauseated.  Patient does have ketones in her urine and appears dehydrated.  She has tried multiple medications at home without relief.  She does not have an acute abdomen.  Call will be placed to the hospitalist to discuss admission.    [WH]   2149 Case discussed with KATIANA Fulton, and she agrees admit the patient to Dr. Amin.  Pertinent exam findings, test results, ED course were discussed with her.  Will be admitted to [facility].    []      ED Course User Index  [WH] Micah Will MD       AS OF 22:33 VITALS:    BP - 156/98  HR - 89  TEMP - 98.5 °F (36.9 °C)  O2 SATS - 100%      DIAGNOSIS  Final diagnoses:   Intractable vomiting with nausea, unspecified vomiting type   Dehydration   Epigastric pain         DISPOSITION  Admission    ADMISSION    Discussed treatment plan and reason for admission with pt/family and admitting physician.  Pt/family voiced understanding of the plan for admission for further testing/treatment as needed.         Please note that this document was completed using voice recognition software     Micah Will MD  07/17/20 2233      Electronically signed by Micah Will MD at 07/17/20 2233     Nikki Byers RN at 07/17/20 2232        Pt noted to have mask on when this RN entered the room.  This RN wore mask and goggles throughout our encounter.  Hand hygiene performed upon entering and exiting room       Nikki Byers RN  07/17/20 2233      Electronically signed by Nikki Byers RN at 07/17/20 2233          Physician Progress Notes (last 72  hours) (Notes from 07/17/20 0928 through 07/20/20 0928)      Adrianne Bautista MD at 07/19/20 1225          [facility]  Inpatient Progress Note    Reason for Follow Up: Epigastric pain, nausea, vomiting    Subjective     Interval History:   One-to-one sitter for suicidal ideation has now been DC'd.  She denies any issues with epigastric pain.  She has been tolerating diet well.    I reviewed Dr. Shultz's note and there are significant social stressors at home.    Current Facility-Administered Medications:   •  acetaminophen (TYLENOL) tablet 650 mg, 650 mg, Oral, Q4H PRN, Olivia Barclay, APRN  •  albuterol (PROVENTIL) nebulizer solution 0.083% 2.5 mg/3mL, 2.5 mg, Nebulization, Q6H PRN, Olivia Barclay, APRN  •  aluminum-magnesium hydroxide-simethicone (MAALOX MAX) 400-400-40 MG/5ML suspension 15 mL, 15 mL, Oral, Q6H PRN, Olivia Barclay, APRN  •  bisacodyl (DULCOLAX) EC tablet 5 mg, 5 mg, Oral, Daily PRN, Olivia Barclay APRN  •  bisacodyl (DULCOLAX) suppository 10 mg, 10 mg, Rectal, Daily PRN, Olivia Barclay, APRN  •  famotidine (PEPCID) tablet 20 mg, 20 mg, Oral, BID AC, Chris Shultz MD, 20 mg at 07/19/20 0632  •  LORazepam (ATIVAN) injection 0.5 mg, 0.5 mg, Intravenous, Once, Kirstin Lancaster APRN  •  ondansetron (ZOFRAN) tablet 4 mg, 4 mg, Oral, Q6H PRN, 4 mg at 07/18/20 1801 **OR** ondansetron (ZOFRAN) injection 4 mg, 4 mg, Intravenous, Q6H PRN, Olivia Barclay APRN, 4 mg at 07/19/20 0125  •  promethazine (PHENERGAN) suppository 25 mg, 25 mg, Rectal, Q6H PRN, Olivia Barclay APRN  •  sodium chloride 0.9 % flush 10 mL, 10 mL, Intravenous, PRN, Micah Will MD  •  sodium chloride 0.9 % flush 10 mL, 10 mL, Intravenous, PRN, Olivia Barclay APRN  •  sodium chloride 0.9 % with KCl 20 mEq/L infusion, 100 mL/hr, Intravenous, Continuous, Olivia Barclay APRN, Last Rate: 100 mL/hr at 07/19/20 0634, 100 mL/hr at 07/19/20 0634  •   venlafaxine XR (EFFEXOR-XR) 24 hr capsule 75 mg, 75 mg, Oral, Daily, Olivia Barclay APRN, 75 mg at 07/19/20 1027  •  ziprasidone (GEODON) injection 10 mg, 10 mg, Intramuscular, Daily With Breakfast, Chris Shultz MD  •  ziprasidone (GEODON) injection 20 mg, 20 mg, Intramuscular, Q4H PRN, Olivia Barclay APRN, 20 mg at 07/19/20 0111  •  ziprasidone (GEODON) injection 20 mg, 20 mg, Intramuscular, Daily With Dinner, Chris Shultz MD  Review of Systems:     The following systems were reviewed and negative: Constitution, pulmonary, cardiac, gastrointestinal, genitourinary      Objective     Vital Signs  Temp:  [98.2 °F (36.8 °C)-98.9 °F (37.2 °C)] 98.4 °F (36.9 °C)  Heart Rate:  [] 112  Resp:  [16-18] 16  BP: (144-176)/() 144/89  Body mass index is 36.92 kg/m².    Intake/Output Summary (Last 24 hours) at 7/19/2020 1225  Last data filed at 7/19/2020 1000  Gross per 24 hour   Intake 2410 ml   Output 2550 ml   Net -140 ml     I/O this shift:  In: 240 [P.O.:240]  Out: 250 [Urine:250]     Physical Exam:   General: patient awake, alert and cooperative   Eyes: Normal lids and lashes, no scleral icterus   Neck: supple, normal ROM   Skin: warm and dry, not jaundiced   Cardiovascular: regular rhythm and rate, no murmurs auscultated   Pulm: clear to auscultation bilaterally, regular and unlabored   Abdomen: soft, obese, nontender, nondistended; normal bowel sounds   Rectal: deferred   Extremities: no rash or edema   Psychiatric: Normal mood and behavior; memory intact     Results Review:     I reviewed the patient's new clinical results.    Results from last 7 days   Lab Units 07/18/20  0434 07/17/20  1954 07/16/20  0624   WBC 10*3/mm3 9.06 10.23 7.94   HEMOGLOBIN g/dL 9.0* 10.5* 9.7*   HEMATOCRIT % 29.7* 34.6 32.1*   PLATELETS 10*3/mm3 345 456* 347     Results from last 7 days   Lab Units 07/18/20  0434 07/17/20 1954 07/16/20  0625 07/15/20  0035   SODIUM mmol/L 138 143 139 142   POTASSIUM  mmol/L 3.3* 3.3* 3.0* 3.5   CHLORIDE mmol/L 104 105 101 101   CO2 mmol/L 24.3 24.2 24.7 23.9   BUN mg/dL 9 10 10 9   CREATININE mg/dL 0.70 0.83 0.72 0.74   CALCIUM mg/dL 8.9 9.6 9.4 10.0   BILIRUBIN mg/dL  --  0.5 0.4 0.2   ALK PHOS U/L  --  67 60 69   ALT (SGPT) U/L  --  14 10 14   AST (SGOT) U/L  --  17 12 14   GLUCOSE mg/dL 81 116* 93 96         Lab Results   Lab Value Date/Time    LIPASE 27 07/17/2020 1954    LIPASE 56 07/16/2020 0625    LIPASE 29 07/15/2020 0035    LIPASE 21 07/13/2020 0043    LIPASE 21 05/29/2020 0302    LIPASE 38 12/04/2018 0832    LIPASE 22 12/03/2018 2034    LIPASE 39 09/18/2018 0944    LIPASE 26 09/18/2018 0047    LIPASE 57 06/30/2018 0111    LIPASE 46 06/28/2018 2052    LIPASE 246 (H) 06/27/2018 1205    LIPASE 30 07/17/2017 0933       Radiology:  CT Abdomen Pelvis Without Contrast   Final Result   No evidence for acute abnormality in the abdomen or pelvis.   No renal stone or hydronephrosis.       Radiation dose reduction techniques were utilized, including automated   exposure control and exposure modulation based on body size.       This report was finalized on 7/18/2020 2:40 PM by Dr. Roman Cano M.D.              Assessment/Plan     Patient Active Problem List   Diagnosis   • PCOS (polycystic ovarian syndrome)   • Hirsutism   • Obesity   • Nausea   • Pain of upper abdomen   • Non-intractable vomiting with nausea   • Intractable vomiting   • Obesity (BMI 30-39.9)   • GERD (gastroesophageal reflux disease)   • Anxiety   • Asthma   • Tobacco abuse   • Hypokalemia       Impression  1.  Nausea and vomiting: Resolved    2.  Epigastric pain: Improved    3.  Microcytic anemia: Suspected secondary to menstrual losses    Plan  Continue bland diet  Continue PPI  As needed nausea medications  Stop marijuana usage  It appears that there are component of social stressors contributing to her symptoms which have been addressed by psychiatry    She has follow-up scheduled in our office and she  plans to keep that appointment     Okay for home from GI standpoint    I discussed the patients findings and my recommendations with patient, family and nursing staff.    Over 25 minutes was spent reviewing the patient's chart and records, in discussion with the patient, in examination of the patient, and in discussion with members of the patient's medical team.    Adrianne Bautista MD    Electronically signed by Adrianne Bautista MD at 20 1321     Chris Shultz MD at 20 0827              DAILY PROGRESS NOTE  [facility]    Patient Identification:  Name: Alicia Powell  Age: 24 y.o.  Sex: female  :  1995  MRN: [id number]         Primary Care Physician: Arian Barnard APRN    Subjective:  Interval History: Currently patient feels nauseous.  See below it assessment and plan as I further illustrate our discussions.    Objective: RN x2 with me at bedside as chaperone with bedside sitter present.  No family present.  Patient seems emotionally unstable.  She is conversational and otherwise pleasant.  She was thankful for my counseling and in agreement with much of my assessment.    Scheduled Meds:  famotidine 20 mg Oral BID AC   LORazepam 0.5 mg Intravenous Once   venlafaxine XR 75 mg Oral Daily   ziprasidone 10 mg Intramuscular Daily With Breakfast   ziprasidone 20 mg Intramuscular Daily With Dinner     Continuous Infusions:  sodium chloride 0.9 % with KCl 20 mEq 100 mL/hr Last Rate: 100 mL/hr (20 0634)       Vital signs in last 24 hours:  Temp:  [98.2 °F (36.8 °C)-98.9 °F (37.2 °C)] 98.2 °F (36.8 °C)  Heart Rate:  [] 106  Resp:  [16-18] 16  BP: (143-176)/() 156/100    Intake/Output:    Intake/Output Summary (Last 24 hours) at 2020 08  Last data filed at 2020 0642  Gross per 24 hour   Intake 2170 ml   Output 2800 ml   Net -630 ml       Exam:  /100 (BP Location: Left arm, Patient Position: Sitting)   Pulse 106   Temp 98.2 °F (36.8 °C) (Oral)   " Resp 16   Ht 162.6 cm (64\")   Wt 97.6 kg (215 lb 1.6 oz)   LMP 07/06/2020   SpO2 100%   BMI 36.92 kg/m²      General Appearance:    Alert, cooperative, tearful, AAOx3                          Head:    Normocephalic, without obvious abnormality, atraumatic                        Throat:   Oral mucosa pink and moist                           Neck:   No JVD                         Lungs:    Clear to auscultation bilaterally, respirations unlabored                          Heart:    Regular rate and rhythm, S1 and S2 normal                  Abdomen:     Soft, nontender, bowel sounds active                 Extremities: Moving all with adequate/appropriate strength, no cyanosis or edema                        Pulses:   Pulses palpable in lower extremities                            Skin:   Skin is warm and dry                  Neurologic:   CNII-XII intact, no focal deficits noted     Data Review:  Labs in chart were reviewed.    Assessment:  Active Hospital Problems    Diagnosis  POA   • **Intractable vomiting [R11.10]  Yes   • Obesity (BMI 30-39.9) [E66.9]  Yes   • GERD (gastroesophageal reflux disease) [K21.9]  Yes   • Anxiety [F41.9]  Yes   • Asthma [J45.909]  Yes   • Tobacco abuse [Z72.0]  Yes   • Hypokalemia [E87.6]  Yes   • PCOS (polycystic ovarian syndrome) [E28.2]  Yes      Resolved Hospital Problems   No resolved problems to display.       Plan:    Patient was discharged yesterday and then I find it a bit interesting that as she starts to learn about her plans for disposition the nausea and emesis comes right back.  I went ahead and cancel discharge and gave a one-time dose of IV Phenergan.  I had concerns yesterday about secondary gains with this patient and concerns for medication/drug-seeking behaviors.  At that juncture I continued IVF as well as full liquid diet implemented IV Zofran and p.o. versus LA Phenergan.  I did not want any IV narcotics given for her complaints of pain nor did I want any IV " Ativan administered.  Patient received 20 mg IM Geodon with some mild relief.    I discussed the case at length with nurses x2 including night nurse and a nurse this morning    I also went into the room and discussed the case with the patient and illustrated my above concerns.  She was quite forthcoming with the fact that she openly admitted to numerous home stressors.  She states she is financially broke though her  is working as a contractor.  Patient states she has running water and electricity at this point but they have no air conditioning.  She also states that she has a plethora of animals including I believe 9 cats.      Suicide precautions were taken overnight secondary to her reports.  She is currently not admitting to any SI/HI    I have placed a consult to psychiatry for uncontrolled depression and ultimately still feel this patient is medically stable for discharge unless there is any objection from psychiatry and I would appreciate their input in regards to the necessity for further needs for suicide precautions.  I am curious if they would be interested in taking this patient to the [facility] unless they endorse outpatient counseling which is also going to be tough for this patient to attend as well as afford.    Regardless at this point I still feel the patient is medically stable for disposition.  I feel she is manifesting her emotions and her gotten she is forcibly causing herself to have emesis.  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  Still giving patient the benefit of the doubt and continuing with treatment of Pepcid for GI prophylaxis/treatment of GERD    Chris Shultz MD  7/19/2020  08:27      Electronically signed by Chris Shultz MD at 07/19/20 0832          Consult Notes (last 72 hours) (Notes from 07/17/20 0928 through 07/20/20 0928)      Jayro Vivas MD at 07/19/20 1720     "    Consult Orders    1. Inpatient Psychiatrist Consult [[id number]] ordered by Chris Shultz MD at 07/19/20 0826              Requesting Physician:  Dr. Shultz  Reason for Consult: Depression    Date of admission: July 17, 2020  Date of assessment: July 19, 2020    Chief Complaint: None given    History of presenting illness: Patient is a  24 y.o. female with a past psychiatric history of depression and anxiety seen on consultation for depression and suicidal ideation.  The patient had presented to the ED complaining of epigastric pain and intractable vomiting.  Patient had been seen 4 times in the ED over a period of 5 days prior to admission.  She has had a GI work-up that has been unremarkable.  She was to be discharged home yesterday, but her nausea and emesis suddenly worsened.  She then reported suicidal ideation.  She was seen by access and reported that she would not have a desire to die if she had adequate pain medication.  She has reportedly been asking for various pain medications, some by name.  She was placed on a one-to-one due to reports of suicidal ideation.  She has been noted to possibly be malingering.    I reviewed the case with the patient's nurse.  Nurse reports that patient seem to be with \"forced\" vomiting yesterday.  She has had no vomiting this morning and only mild complaints of epigastric pain patient is reportedly asking for discharge.    Patient reports a previous inpatient admission to [facility] at 14 years old.  She has no history of suicide attempt.  She does report a history of self harming behaviors in the form of scratching her arm.  This last occurred 1 1/2 years ago.  She has no current outpatient psychiatrist.  Recently she has been treated by her primary care physician with Effexor and Seroquel.  Currently she reports she is \"slightly\" depressed.  She reports that she has been off her psych meds for several days due to vomiting.  Overall she has been on " Effexor for 6 months and feels that this helps.  She denies current suicidal ideation, homicidal ideation and audiovisual hallucinations.  She denies any current pain or nausea.    Past psychiatric history: See HPI    Past medical history:  Diagnoses: Obesity, GERD, PCOS, vomiting  Medications:   Pepcid 20 mg twice a day, Effexor XR 75 mg daily, Geodon 20 mg IM daily  Medication allergies: Tylenol.    Social history: Noncontributory    Family history: Noncontributory    Substance abuse history:  Daily marijuana use.  Occasional alcohol use.  Denies illicit drug use.    Vital Signs    Temp:  98.4  Heart Rate:  112  Resp:  16  BP:  144/89    Mental Status Exam: The patient is found sitting in bed.  She is awake and alert.  She is fully oriented.  She is wearing hospital gown.  She is wearing a mask.  She is in no acute distress.  Speech is fluent with normal tone and volume.  Mood described as being anxious and depressed.  Affect is congruent.  She denies any acute suicidal ideation, homicidal ideation or audiovisual hallucinations.  Thought processes are mildly circumstantial.  Judgment insight is okay.  Memory is intact.    Assessment:   Major depressive disorder, moderate, recurrent;  Cannabis Use Disorder    Treatment Plan:     Patient does not desire any change in her psychiatric medications.  She denies any suicidal ideation therefore I will discontinue the sitter.  She does not required any further psychiatric treatment at this time.  She may follow-up with community mental health services.  Recommend cessation of marijuana.    Thank you for this consultation.  Please contact access for any additional requests.    Electronically signed by Jayro Vivas MD at 07/19/20 1305     Irineo Briceno at 07/18/20 2300      Consult Orders    1. Inpatient Spiritual Care Consult [[id number]] ordered by Chris Shultz MD at 07/18/20 0206              Responded to nurse referral for SC for pt. Pt expressed that she  "was in pain and asked for pain medication, some by name but \"any pain medication would do.\" Sat with pt to hear some of her story but pt always brought things to her pain and need for something. Several times she expressed the pain \"is so bad I'd rather be dead.\" RN had already informed me an access consult had been put in. Continued with pt until nurse stepped in with a hot towel for some pain relief.     Electronically signed by Irineo Briceno at 07/18/20 7311     Adrianne Bautista MD at 07/18/20 1439      Consult Orders    1. Inpatient Gastroenterology Consult [[id number]] ordered by Olivia Barclay APRN at 07/17/20 2333                [facility]  Initial Inpatient Consult Note    Referring Provider: Deny REYNA    Reason for Consultation: intractable nausea, vomiting    Subjective     History of present illness:      Thank you for requesting my opinion.    This is a 24-year-old beast woman, who was seen Dr. Morrison 1 year ago, admitted through the ER for intractable nausea and vomiting.  She says symptoms have been going on for approximately 1 week.  Unclear precipitants.  She describes severe epigastric pain followed by intractable nausea and vomiting.  She says she has been unable to keep any food down, including toast.  She had been taking Pepcid twice a day for acid reflux symptoms but without relief.  She denies any change in her bowel movements including no diarrhea nor any constipation.    She did have an EGD with Dr. Morrison 1 year ago that was fairly bland.  This was done for epigastric symptoms    She does report that she does smoke marijuana daily and did get some relief with taking hot showers recently.  However, she says that she has not smoked marijuana in about a week    She was able to tolerate full liquid lunch today without any nausea or vomiting.    CT imaging today was normal.    She does have follow-up scheduled with Dr. Morrison later this month    Past Medical " History:  Past Medical History:   Diagnosis Date   • Abnormal uterine bleeding (AUB) 5/6/2016   • Anemia    • Anxiety    • ASCUS of cervix with negative high risk HPV 8/1/2018   • Asthma    • Depression    • Dizziness    • GERD (gastroesophageal reflux disease)    • H/O ETOH abuse    • Multiple URI    • PCOS (polycystic ovarian syndrome) 5/6/2016   • PCOS (polycystic ovarian syndrome)    • Pneumonia    • Tobacco abuse 7/17/2020   • Viral infection        Past Surgical History:  Past Surgical History:   Procedure Laterality Date   • DENTAL PROCEDURE     • ENDOSCOPY N/A 7/14/2018    Procedure: ESOPHAGOGASTRODUODENOSCOPY WITH COLD BIOPSIES;  Surgeon: Damon Morrison MD;  Location: [facility] ENDOSCOPY;  Service: Gastroenterology   • VAGINAL SEPTUM RESECTION  2012    Excision   • WISDOM TOOTH EXTRACTION  02/2016        Social History:   Social History     Tobacco Use   • Smoking status: Current Some Day Smoker     Types: Cigars   • Smokeless tobacco: Never Used   • Tobacco comment: infreq   Substance Use Topics   • Alcohol use: Yes     Frequency: 2-4 times a month     Drinks per session: 3 or 4     Comment: 3 to 4 drinks per month         Family History:  Family History   Problem Relation Age of Onset   • Anxiety disorder Mother    • Depression Mother    • CALDERON disease Mother    • Asthma Mother    • Mental illness Mother    • Hypertension Maternal Grandmother    • Hyperlipidemia Maternal Grandmother    • Other Maternal Grandmother         GERD   • Arthritis Maternal Grandmother    • COPD Maternal Grandmother    • Depression Maternal Grandmother    • Heart disease Maternal Grandmother    • Mental illness Maternal Grandmother    • CALDERON disease Father    • Pancreatitis Father    • Alcohol abuse Father    • Asthma Father    • Heart disease Maternal Grandfather    • Heart disease Paternal Grandfather    • Mental retardation Maternal Uncle    • Breast cancer Neg Hx    • Ovarian cancer Neg Hx    • Uterine cancer Neg Hx    • Colon  cancer Neg Hx        Home Meds:  Facility-Administered Medications Prior to Admission   Medication Dose Route Frequency Provider Last Rate Last Dose   • cyanocobalamin injection 1,000 mcg  1,000 mcg Intramuscular Q28 Days Dania Alston PA   1,000 mcg at 12/18/18 1440     Medications Prior to Admission   Medication Sig Dispense Refill Last Dose   • albuterol sulfate  (90 Base) MCG/ACT inhaler Inhale 2 puffs Every 6 (Six) Hours As Needed for Wheezing or Shortness of Air. 6.7 g 0 Taking   • ondansetron ODT (Zofran ODT) 4 MG disintegrating tablet Place 1 tablet on the tongue Every 8 (Eight) Hours As Needed for Nausea or Vomiting. 15 tablet 0 Past Week at Unknown time   • pantoprazole (PROTONIX) 40 MG EC tablet Take 1 tablet by mouth Daily. 21 tablet 0 Past Week at Unknown time   • Probiotic Product (PROBIOTIC-10 PO) Take 1 tablet by mouth Daily.   Past Week at Unknown time   • promethazine (PHENERGAN) 25 MG suppository Insert 1 suppository into the rectum Every 6 (Six) Hours As Needed for Nausea or Vomiting. 24 suppository 0 7/17/2020 at Unknown time   • promethazine (PHENERGAN) 25 MG tablet Take 1 tablet by mouth Every 6 (Six) Hours As Needed for Nausea or Vomiting. 12 tablet 0 7/17/2020 at Unknown time   • QUEtiapine (SEROquel) 50 MG tablet Take 3 tablets by mouth Every Night. 90 tablet 5 7/16/2020 at Unknown time   • sucralfate (CARAFATE) 1 g tablet Take 1 tablet by mouth 4 (Four) Times a Day. 28 tablet 0 7/17/2020 at Unknown time   • sucralfate (CARAFATE) 1 GM/10ML suspension Take 10 mL by mouth 4 (Four) Times a Day With Meals & at Bedtime. 420 mL 0 7/17/2020 at Unknown time   • venlafaxine XR (EFFEXOR-XR) 75 MG 24 hr capsule Take 1 capsule by mouth Daily. 30 capsule 5 7/16/2020 at Unknown time       Current Meds:     famotidine 20 mg Oral BID AC   QUEtiapine 150 mg Oral Nightly   venlafaxine XR 75 mg Oral Daily       Allergies:  Allergies   Allergen Reactions   • Acetaminophen Nausea And Vomiting              Review of Systems  All systems were reviewed and negative except for:  Gastrointestinal: positive for  Nausea, vomiting, epigastric pain     Objective     Vital Signs  Temp:  [98.3 °F (36.8 °C)-100 °F (37.8 °C)] 98.5 °F (36.9 °C)  Heart Rate:  [85-96] 96  Resp:  [18] 18  BP: (129-169)/(80-99) 143/93    Physical Exam:  Constitutional:   Alert, cooperative, in no acute distress, appears stated age   Eyes:           Lids and lashes normal, conjunctivae and sclerae normal,   no icterus   Ears, nose, mouth and throat:  Normal appearance of external ears and nose, no oral l  lesions, no thrush, oral mucosa moist   Respiratory:    Clear to auscultation, respirations regular, even and             unlabored    Cardiovascular:   Regular rhythm and normal rate, normal S1 and S2, no        murmur, no gallop, palpable distal pulses, no lower extremity edema   Gastrointestinal:    Soft, obese, nondistended, nontender to palpation, no guarding, no rebound tenderness, normal bowel sounds, no palpable masses or organomegaly  Rectal exam: deferred   Musculoskeletal:  Normal station, no atrophy, no tenderness to palpation, normal digits and nails   Skin:  Normal color, no bleeding, bruising, rashes or lesions   Lymphatics:  No palpable cervical or supraclavicular adenopathy   Psychiatric:  Judgement and insight: normal   Orientation to person, place and time: normal   Mood and affect: normal       Results Review:   I reviewed the patient's new clinical results.    Results from last 7 days   Lab Units 07/18/20 0434 07/17/20 1954 07/16/20 0624   WBC 10*3/mm3 9.06 10.23 7.94   HEMOGLOBIN g/dL 9.0* 10.5* 9.7*   HEMATOCRIT % 29.7* 34.6 32.1*   PLATELETS 10*3/mm3 345 456* 347       Results from last 7 days   Lab Units 07/18/20 0434 07/17/20 1954 07/16/20 0625 07/15/20  0035   SODIUM mmol/L 138 143 139 142   POTASSIUM mmol/L 3.3* 3.3* 3.0* 3.5   CHLORIDE mmol/L 104 105 101 101   CO2 mmol/L 24.3 24.2 24.7 23.9   BUN mg/dL 9 10 10  9   CREATININE mg/dL 0.70 0.83 0.72 0.74   CALCIUM mg/dL 8.9 9.6 9.4 10.0   BILIRUBIN mg/dL  --  0.5 0.4 0.2   ALK PHOS U/L  --  67 60 69   ALT (SGPT) U/L  --  14 10 14   AST (SGOT) U/L  --  17 12 14   GLUCOSE mg/dL 81 116* 93 96             Lab Results   Lab Value Date/Time    LIPASE 27 07/17/2020 1954    LIPASE 56 07/16/2020 0625    LIPASE 29 07/15/2020 0035    LIPASE 21 07/13/2020 0043    LIPASE 21 05/29/2020 0302    LIPASE 38 12/04/2018 0832    LIPASE 22 12/03/2018 2034    LIPASE 39 09/18/2018 0944    LIPASE 26 09/18/2018 0047    LIPASE 57 06/30/2018 0111    LIPASE 46 06/28/2018 2052    LIPASE 246 (H) 06/27/2018 1205    LIPASE 30 07/17/2017 0933       Radiology:  Imaging Results (Last 72 Hours)     Procedure Component Value Units Date/Time    CT Abdomen Pelvis Without Contrast [[id number]] Collected:  07/18/20 1429     Updated:  07/18/20 1429    Narrative:       CT ABDOMEN PELVIS WITHOUT CONTRAST     HISTORY: 24-year-old female with history of nausea and vomiting and  pain.      TECHNIQUE: CT abdomen and pelvis without IV or oral contrast.      COMPARISON:  CT abdomen and pelvis 05/29/2020, 12/04/2018.      FINDINGS: Lung bases appear clear and there is no basilar effusion.  Liver, spleen, adrenal glands, pancreas, and kidneys appear within  normal limits. There is no hydronephrosis. Normal appendix is  visualized. There is no bowel dilatation or evidence for bowel  obstruction. There is no ascites. There is mild stranding and haziness  within the periaortic region/retroperitoneum and this exhibits no  change. There is no ascites.       Impression:       No evidence for acute abnormality in the abdomen or pelvis.  No renal stone or hydronephrosis.     Radiation dose reduction techniques were utilized, including automated  exposure control and exposure modulation based on body size.                Assessment/Plan       Intractable vomiting    PCOS (polycystic ovarian syndrome)    Obesity (BMI 30-39.9)    GERD  (gastroesophageal reflux disease)    Anxiety    Asthma    Tobacco abuse    Hypokalemia      Impression  1.  Intractable nausea and vomiting: Seems to be improving.  No acute findings on her CT scan.  I suspect there is a component of GERD plus cannabis hyperemesis syndrome    2.  Epigastric pain: Suspected GERD compounded by recent vomiting    3.  Microcytic anemia: She endorses heavy periods and I suspect she has iron deficiency anemia from this    Plan  She is overall doing better today and is tolerated full liquids without any vomiting or pain  Continue daily pantoprazole  Okay for some Zofran and/or Phenergan to go home with  I discussed staying on a bland, low-fat diet  Follow-up with Dr. Morrison as scheduled to determine plan for further work-up  Continue to avoid marijuana    Okay for home from GI standpoint    I discussed the patients findings and my recommendations with patient, family and nursing staff    Adrianne Bautista MD  [facility]      Dictated utilizing Dragon dictation      Electronically signed by Adrianne Bautista MD at 07/18/20 1620          Discharge Summary      Chris Shultz MD at 07/18/20 1000                              [facility]    Date of Discharge:  7/18/2020    PCP: Arian Barnard APRN    Discharge Diagnosis:   Active Hospital Problems    Diagnosis  POA   • **Intractable vomiting [R11.10]  Yes   • Obesity (BMI 30-39.9) [E66.9]  Yes   • GERD (gastroesophageal reflux disease) [K21.9]  Yes   • Anxiety [F41.9]  Yes   • Asthma [J45.909]  Yes   • Tobacco abuse [Z72.0]  Yes   • Hypokalemia [E87.6]  Yes   • PCOS (polycystic ovarian syndrome) [E28.2]  Yes      Resolved Hospital Problems   No resolved problems to display.     Procedures Performed       Consults     Date and Time Order Name Status Description    7/17/2020 6564 Inpatient Gastroenterology Consult      7/17/2020 5779 [facility] (on-call MD unless specified) Details  Completed         Hospital Course  Please see history and physical for details. Patient is a 24 y.o. female admitted by my nurse practitioner overnight for nausea and vomiting.  This patient has been using the ER recurrently over the last couple days for the above issues.  She is supposed to be seen by GI sometime next week and ultimately was admitted under observation status overnight.  Again her emesis has resolved and she clinically feels much better.  She had a right upper quadrant ultrasound which is unremarkable.  Lab work is completely unremarkable other than some anemia but patient denies any hematemesis.  Her potassium is just a little bit on the low side we are supplementing that through IVF.  She has no issues with fevers and her white count is normal.  She received IV fluids IV Reglan IV morphine and IV Pepcid.  I have not continue with any further Reglan and I have discontinued morphine.  I think the etiology of this is either cyclic vomiting from use of marijuana versus concerns for gastritis secondary to psychiatric past history.  She admits to multiple stressors.  Review of 2018 EGD was unremarkable including pathology results.  At this juncture I feel the patient's diet can be advanced and will continue IV fluids to help supplement the potassium.  I went to check a CT of the abdomen and pelvis to ensure there is no additional etiologies going on and await further recommendations from GI which is pending.  If GI has no objections or plans for further work-up as an inpatient as I think this patient can be managed as an outpatient, and CT of the abdomen is unremarkable then I feel this patient is more than medically stable for disposition today.  Diet will be advanced to full liquids.  Consideration for outpatient gastric emptying study should be considered.  Psychiatric consultation should also be considered.  No family present at bedside.  I went out discussed case with RN to try to ensure a smooth  disposition home today if we are able to obtain this.       Condition on Discharge: Improved.     Temp:  [98.3 °F (36.8 °C)-100 °F (37.8 °C)] 98.5 °F (36.9 °C)  Heart Rate:  [85-96] 96  Resp:  [18] 18  BP: (129-169)/(80-99) 143/93  Body mass index is 36.92 kg/m².    Physical Exam   Constitutional: She is oriented to person, place, and time. She appears well-developed and well-nourished.   Obese with a BMI of 37   Eyes: Conjunctivae are normal. No scleral icterus.   Neck: No JVD present.   Cardiovascular: Normal rate and regular rhythm.   Pulmonary/Chest: Effort normal and breath sounds normal. No respiratory distress.   Abdominal: Soft. Bowel sounds are normal. She exhibits no distension and no mass. There is no tenderness. There is no rebound and no guarding.   Musculoskeletal: She exhibits no edema.   Neurological: She is alert and oriented to person, place, and time. No cranial nerve deficit.        Discharge Medications      New Medications      Instructions Start Date   famotidine 20 MG tablet  Commonly known as:  PEPCID   20 mg, Oral, 2 Times Daily Before Meals         Continue These Medications      Instructions Start Date   albuterol sulfate  (90 Base) MCG/ACT inhaler  Commonly known as:  PROVENTIL HFA;VENTOLIN HFA;PROAIR HFA   2 puffs, Inhalation, Every 6 Hours PRN      ondansetron ODT 4 MG disintegrating tablet  Commonly known as:  Zofran ODT   4 mg, Translingual, Every 8 Hours PRN      PROBIOTIC-10 PO   1 tablet, Oral, Daily      QUEtiapine 50 MG tablet  Commonly known as:  SEROquel   150 mg, Oral, Nightly      venlafaxine XR 75 MG 24 hr capsule  Commonly known as:  EFFEXOR-XR   75 mg, Oral, Daily         Stop These Medications    pantoprazole 40 MG EC tablet  Commonly known as:  PROTONIX     promethazine 25 MG suppository  Commonly known as:  PHENERGAN     promethazine 25 MG tablet  Commonly known as:  PHENERGAN     sucralfate 1 g tablet  Commonly known as:  CARAFATE     sucralfate 1 GM/10ML  suspension  Commonly known as:  CARAFATE             Additional Instructions for the Follow-ups that You Need to Schedule     Discharge Follow-up with PCP   As directed       Currently Documented PCP:    Arian Barnard APRN    PCP Phone Number:    [phone]     Follow Up Details:  PCP 2 to 3 weeks.  GI per the recommendations           Follow-up Information     Arian Barnard APRN .    Specialties:  Family Medicine, Nurse Practitioner  Why:  PCP 2 to 3 weeks.  GI per the recommendations  Contact information:  [address]  [facility] [address]  [place] [id number] [phone]                 Test Results Pending at Discharge     Chris Shultz MD  07/18/20  10:00    Discharge time spent greater than 30 minutes.    Electronically signed by Chris Shultz MD at 07/18/20 1004       All medication doses during the admission are shown, including meds that are no longer on order.   Scheduled Meds Sorted by Name   for Alicia Powell as of 7/14/20 through 7/20/20     1 Day 3 Days 7 Days 10 Days < Today >    Legend:                          Inactive     Active     Other Encounter    Linked               Medications 07/14/20 07/15/20 07/16/20 07/17/20 07/18/20 07/19/20 07/20/20   cyanocobalamin injection 1,000 mcg   Dose: 1,000 mcg  Freq: Every 28 Days Route: IM  Start: 12/18/18 1442    Admin Instructions:   Given Right Deltoid IM             famotidine (PEPCID) injection 20 mg   Dose: 20 mg  Freq: Every 12 Hours Scheduled Route: IV  Start: 07/19/20 0900 End: 07/18/20 0957    Admin Instructions:   Dilute to 10 mL total volume and give IV push over 2 minutes.        0957-D/C'd        famotidine (PEPCID) injection 20 mg   Dose: 20 mg  Freq: Every 12 Hours Scheduled Route: IV  Start: 07/18/20 0100 End: 07/18/20 0006    Admin Instructions:   Dilute to 10 mL total volume and give IV push over 2 minutes.        0006-D/C'd        famotidine (PEPCID) injection 20 mg   Dose: 20 mg  Freq: Once Route: IV  Start: 07/17/20  2007 End: 07/17/20 2009    Admin Instructions:   Dilute to 10 mL total volume and give IV push over 2 minutes.       2009           famotidine (PEPCID) tablet 20 mg   Dose: 20 mg  Freq: 2 Times Daily Before Meals Route: PO  Start: 07/18/20 1730        1801      0632     1700      0641     1730        HYDROmorphone (DILAUDID) injection 0.5 mg   Dose: 0.5 mg  Freq: Once Route: IV  Start: 07/17/20 2007 End: 07/17/20 2010    Admin Instructions:   If given for pain, use the following pain scale:  Mild Pain = Pain Score of 1-3, CPOT 1-2  Moderate Pain = Pain Score of 4-6, CPOT 3-4  Severe Pain = Pain Score of 7-10, CPOT 5-8       2010           LORazepam (ATIVAN) injection 0.5 mg   Dose: 0.5 mg  Freq: Once Route: IV  Start: 07/19/20 0000    Admin Instructions:    Caution: Look alike/sound alike drug alert         0000         metoclopramide (REGLAN) injection 10 mg   Dose: 10 mg  Freq: Once Route: IV  Start: 07/17/20 2007 End: 07/17/20 2009 2009           promethazine (PHENERGAN) injection 12.5 mg   Dose: 12.5 mg  Freq: Once Route: IV  Start: 07/18/20 1915 End: 07/18/20 1842    Admin Instructions:   IF GIVING IV ONLY, dilute dose in 20 mL NS and infuse over 10 minutes. Administer through large bore vein (not hand or wrist) through running IV line at port furthest from patient's vein.        1842          QUEtiapine (SEROquel) tablet 150 mg   Dose: 150 mg  Freq: Nightly Route: PO  Start: 07/18/20 0100 End: 07/19/20 0826    Admin Instructions:   Caution: Look alike/sound alike drug alert        (8945) [C]     2031 [C]      0826-D/C'd       sucralfate (CARAFATE) tablet 1 g   Dose: 1 g  Freq: 4 Times Daily Route: PO  Start: 07/18/20 0100 End: 07/18/20 0008    Admin Instructions:   Take on an empty stomach.  For nasogastric administration:  1. Remove the cap and plunger from a 60 mL syringe  2. Place the sucralfate tablet inside the syringe  3. Replace the plunger so that minimal airspace exists around the tablet  4.  Draw up approximately 20 mL water into the syringe  5. Replace the syringe cap  6. Allow the syringe to stand for ~5 minutes, shaking occasionally  7. Shake the suspension and administer directly from the syringe into the tube  **Flush tube before and after administration**        0008-D/C'd        venlafaxine XR (EFFEXOR-XR) 24 hr capsule 75 mg   Dose: 75 mg  Freq: Daily Route: PO  Start: 07/18/20 0900    Admin Instructions:   Swallow whole; do not crush or chew.        0836      1027      0922        ziprasidone (GEODON) injection 10 mg   Dose: 10 mg  Freq: Daily With Breakfast Route: IM  Start: 07/19/20 1000    Admin Instructions:   Reconstitute 20-mg vial with 1.2 mL of sterile water for injection. Do not use any other solvent to reconstitute. Shake vigorously until all drug is dissolved. Reconstituted solution concentration is 20 mg/mL.  Max dose 40 mg/day         (1135) [C]      0800        ziprasidone (GEODON) injection 20 mg   Dose: 20 mg  Freq: Daily With Dinner Route: IM  Start: 07/19/20 1800    Admin Instructions:   Reconstitute 20-mg vial with 1.2 mL of sterile water for injection. Do not use any other solvent to reconstitute. Shake vigorously until all drug is dissolved. Reconstituted solution concentration is 20 mg/mL.  Max dose 40 mg/day         1801      1800        Medications 07/14/20 07/15/20 07/16/20 07/17/20 07/18/20 07/19/20 07/20/20       Continuous Meds Sorted by Name   for Alicia Powell as of 7/14/20 through 7/20/20    Legend:                          Inactive     Active     Other Encounter    Linked               Medications 07/14/20 07/15/20 07/16/20 07/17/20 07/18/20 07/19/20 07/20/20   sodium chloride 0.9 % with KCl 20 mEq/L infusion   Rate: 100 mL/hr Dose: 100 mL/hr  Freq: Continuous Route: IV  Last Dose: 100 mL/hr (07/19/20 1701)  Start: 07/18/20 0100        0636     2051      0634     1701             PRN Meds Sorted by Name   for Alicia Powell as of 7/14/20 through 7/20/20     Legend:                          Inactive     Active     Other Encounter    Linked               Medications 07/14/20 07/15/20 07/16/20 07/17/20 07/18/20 07/19/20 07/20/20   acetaminophen (TYLENOL) tablet 650 mg   Dose: 650 mg  Freq: Every 4 Hours PRN Route: PO  PRN Reason: Mild Pain   Start: 07/17/20 2335 End: 07/19/20 1918    Admin Instructions:   Do not exceed 4 grams of acetaminophen in a 24 hr period.    If given for pain, use the following pain scale:   Mild Pain = Pain Score of 1-3, CPOT 1-2  Moderate Pain = Pain Score of 4-6, CPOT 3-4  Severe Pain = Pain Score of 7-10, CPOT 5-8         1918-D/C'd       albuterol (PROVENTIL) nebulizer solution 0.083% 2.5 mg/3mL   Dose: 2.5 mg  Freq: Every 6 Hours PRN Route: NEBULIZATION  PRN Reason: Shortness of Air  Start: 07/18/20 0006             aluminum-magnesium hydroxide-simethicone (MAALOX MAX) 400-400-40 MG/5ML suspension 15 mL   Dose: 15 mL  Freq: Every 6 Hours PRN Route: PO  PRN Reason: Heartburn  Start: 07/17/20 2335    Admin Instructions:   Maximum 60 mL in 24 hours.          0342        bisacodyl (DULCOLAX) EC tablet 5 mg   Dose: 5 mg  Freq: Daily PRN Route: PO  PRN Reason: Constipation  Start: 07/17/20 2335    Admin Instructions:   Swallow whole. Do not crush, split, or chew tablet.             bisacodyl (DULCOLAX) suppository 10 mg   Dose: 10 mg  Freq: Daily PRN Route: RE  PRN Reason: Constipation  Start: 07/17/20 2335             morphine injection 2 mg   Dose: 2 mg  Freq: Every 3 Hours PRN Route: IV  PRN Reason: Moderate Pain   Start: 07/18/20 0005 End: 07/18/20 0916    Admin Instructions:   If given for pain, use the following pain scale:  Mild Pain = Pain Score of 1-3, CPOT 1-2  Moderate Pain = Pain Score of 4-6, CPOT 3-4  Severe Pain = Pain Score of 7-10, CPOT 5-8        0050 0916-D/C'd        ondansetron (ZOFRAN) tablet 4 mg   Dose: 4 mg  Freq: Every 6 Hours PRN Route: PO  PRN Reasons: Nausea,Vomiting  Start: 07/17/20 2264    Admin Instructions:   If  BOTH ondansetron (ZOFRAN) and promethazine (PHENERGAN) are ordered use ondansetron first and THEN promethazine IF ondansetron is ineffective.           1801         2317      0751        Or  ondansetron (ZOFRAN) injection 4 mg   Dose: 4 mg  Freq: Every 6 Hours PRN Route: IV  PRN Reasons: Nausea,Vomiting  Start: 07/17/20 2335    Admin Instructions:   If BOTH ondansetron (ZOFRAN) and promethazine (PHENERGAN) are ordered use ondansetron first and THEN promethazine IF ondansetron is ineffective.        0050         0125               promethazine (PHENERGAN) suppository 25 mg   Dose: 25 mg  Freq: Every 6 Hours PRN Route: RE  PRN Reasons: Nausea,Vomiting  Start: 07/18/20 0006             sodium chloride 0.9 % flush 10 mL   Dose: 10 mL  Freq: As Needed Route: IV  PRN Reason: Line Care  Start: 07/17/20 2334             sodium chloride 0.9 % flush 10 mL   Dose: 10 mL  Freq: As Needed Route: IV  PRN Reason: Line Care  Start: 07/17/20 1914             ziprasidone (GEODON) injection 20 mg   Dose: 20 mg  Freq: Every 4 Hours PRN Route: IM  PRN Reason: Agitation  Start: 07/19/20 0037    Admin Instructions:   Reconstitute 20-mg vial with 1.2 mL of sterile water for injection. Do not use any other solvent to reconstitute. Shake vigorously until all drug is dissolved. Reconstituted solution concentration is 20 mg/mL.  Max dose 40 mg/day         0111      0020        Medications 07/14/20 07/15/20 07/16/20 07/17/20 07/18/20 07/19/20 07/20/20

## 2020-07-20 NOTE — PLAN OF CARE
"  Problem: Suicide Risk (Adult)  Goal: Identify Related Risk Factors and Signs and Symptoms  Outcome: Ongoing (interventions implemented as appropriate)   Much calmer this shift, ambulating aston pena, med for nausea-mod amt brown liquid emesis with some blood streaks about 5 hrs later, med for c/o indigestion, poss d/c  today around took approximately 20 showers this shift-would rub her abd and say it hurts-go into shower then say \"I feel so much better\".    "

## 2020-07-20 NOTE — PAYOR COMM NOTE
"Lisandra Chahal (24 y.o. Female)     CT SUMMARY [id number]      MARK ANTHONY GOLDSTEIN   F - [phone]    Date of Birth Social Security Number Address Home Phone MRN    1995  [address] [facility] [address] [phone] [id number]    [employer] Marital Status          None Single       Admission Date Admission Type Admitting Provider Attending Provider Department, Room/Bed    7/17/20 Emergency Toby Amin MD Masden, Troy Andrew, MD [address], P699/1    Discharge Date Discharge Disposition Discharge Destination         Home or Self Care              Attending Provider:  Chris Shultz MD    Allergies:  Acetaminophen    Isolation:  None   Infection:  None   Code Status:  CPR    Ht:  162.6 cm (64\")   Wt:  97.6 kg (215 lb 1.6 oz)    Admission Cmt:  None   Principal Problem:  Intractable vomiting [R11.10]                 Active Insurance as of 7/17/2020     Primary Coverage     Payor Plan Insurance Group Employer/Plan Group    [employer] [place] [employer] [place]      Payor Plan Address Payor Plan Phone Number Payor Plan Fax Number Effective Dates    [address] [address] [phone]  6/27/2018 - None Entered    [facility] [id number]       Subscriber Name Subscriber Birth Date Member ID       LISANDRA CHAHAL 1995 [id number]                 Emergency Contacts      (Rel.) Home Phone Work Phone Mobile Phone    Sonja Gleason (Mother) [phone] -- --               Discharge Summary      Chris Shultz MD at 07/18/20 [address] [place] [address]    Date of Discharge:  7/18/2020    PCP: Arian Barnard APRN    Discharge Diagnosis:   Active Hospital Problems    Diagnosis  POA   • **Intractable vomiting [R11.10]  Yes   • Obesity (BMI 30-39.9) [E66.9]  Yes   • GERD (gastroesophageal reflux disease) [K21.9]  Yes   • Anxiety [F41.9]  Yes   • Asthma [J45.909]  Yes   • Tobacco abuse [Z72.0]  Yes   • Hypokalemia " [E87.6]  Yes   • PCOS (polycystic ovarian syndrome) [E28.2]  Yes      Resolved Hospital Problems   No resolved problems to display.     Procedures Performed       Consults     Date and Time Order Name Status Description    7/17/2020 0153 Inpatient Gastroenterology Consult      7/17/2020 2123 [facility] (on-call MD unless specified) Details Completed         Hospital Course  Please see history and physical for details. Patient is a 24 y.o. female admitted by my nurse practitioner overnight for nausea and vomiting.  This patient has been using the ER recurrently over the last couple days for the above issues.  She is supposed to be seen by GI sometime next week and ultimately was admitted under observation status overnight.  Again her emesis has resolved and she clinically feels much better.  She had a right upper quadrant ultrasound which is unremarkable.  Lab work is completely unremarkable other than some anemia but patient denies any hematemesis.  Her potassium is just a little bit on the low side we are supplementing that through IVF.  She has no issues with fevers and her white count is normal.  She received IV fluids IV Reglan IV morphine and IV Pepcid.  I have not continue with any further Reglan and I have discontinued morphine.  I think the etiology of this is either cyclic vomiting from use of marijuana versus concerns for gastritis secondary to psychiatric past history.  She admits to multiple stressors.  Review of 2018 EGD was unremarkable including pathology results.  At this juncture I feel the patient's diet can be advanced and will continue IV fluids to help supplement the potassium.  I went to check a CT of the abdomen and pelvis to ensure there is no additional etiologies going on and await further recommendations from GI which is pending.  If GI has no objections or plans for further work-up as an inpatient as I think this patient can be managed as an outpatient, and CT of the abdomen is unremarkable then  I feel this patient is more than medically stable for disposition today.  Diet will be advanced to full liquids.  Consideration for outpatient gastric emptying study should be considered.  Psychiatric consultation should also be considered.  No family present at bedside.  I went out discussed case with RN to try to ensure a smooth disposition home today if we are able to obtain this.       Condition on Discharge: Improved.     Temp:  [98.3 °F (36.8 °C)-100 °F (37.8 °C)] 98.5 °F (36.9 °C)  Heart Rate:  [85-96] 96  Resp:  [18] 18  BP: (129-169)/(80-99) 143/93  Body mass index is 36.92 kg/m².    Physical Exam   Constitutional: She is oriented to person, place, and time. She appears well-developed and well-nourished.   Obese with a BMI of 37   Eyes: Conjunctivae are normal. No scleral icterus.   Neck: No JVD present.   Cardiovascular: Normal rate and regular rhythm.   Pulmonary/Chest: Effort normal and breath sounds normal. No respiratory distress.   Abdominal: Soft. Bowel sounds are normal. She exhibits no distension and no mass. There is no tenderness. There is no rebound and no guarding.   Musculoskeletal: She exhibits no edema.   Neurological: She is alert and oriented to person, place, and time. No cranial nerve deficit.        Discharge Medications      New Medications      Instructions Start Date   famotidine 20 MG tablet  Commonly known as:  PEPCID   20 mg, Oral, 2 Times Daily Before Meals         Continue These Medications      Instructions Start Date   albuterol sulfate  (90 Base) MCG/ACT inhaler  Commonly known as:  PROVENTIL HFA;VENTOLIN HFA;PROAIR HFA   2 puffs, Inhalation, Every 6 Hours PRN      ondansetron ODT 4 MG disintegrating tablet  Commonly known as:  Zofran ODT   4 mg, Translingual, Every 8 Hours PRN      PROBIOTIC-10 PO   1 tablet, Oral, Daily      QUEtiapine 50 MG tablet  Commonly known as:  SEROquel   150 mg, Oral, Nightly      venlafaxine XR 75 MG 24 hr capsule  Commonly known as:   EFFEXOR-XR   75 mg, Oral, Daily         Stop These Medications    pantoprazole 40 MG EC tablet  Commonly known as:  PROTONIX     promethazine 25 MG suppository  Commonly known as:  PHENERGAN     promethazine 25 MG tablet  Commonly known as:  PHENERGAN     sucralfate 1 g tablet  Commonly known as:  CARAFATE     sucralfate 1 GM/10ML suspension  Commonly known as:  CARAFATE             Additional Instructions for the Follow-ups that You Need to Schedule     Discharge Follow-up with PCP   As directed       Currently Documented PCP:    Arian Barnard APRN    PCP Phone Number:    [phone]     Follow Up Details:  PCP 2 to 3 weeks.  GI per the recommendations           Follow-up Information     Arian Barnard APRN .    Specialties:  Family Medicine, Nurse Practitioner  Why:  PCP 2 to 3 weeks.  GI per the recommendations  Contact information:  [address] [address]  [phone]                 Test Results Pending at Discharge     Chris Shultz MD  07/18/20  10:00    Discharge time spent greater than 30 minutes.    Electronically signed by Chris Shultz MD at 07/18/20 1004     Chris Shultz MD at 07/20/20 0926                              [facility]    Date of Discharge:  7/20/2020    PCP: Arian Barnard APRN    Discharge Diagnosis:   Active Hospital Problems    Diagnosis  POA   • **Intractable vomiting [R11.10]  Yes   • Obesity (BMI 30-39.9) [E66.9]  Yes   • GERD (gastroesophageal reflux disease) [K21.9]  Yes   • Anxiety [F41.9]  Yes   • Asthma [J45.909]  Yes   • Tobacco abuse [Z72.0]  Yes   • Hypokalemia [E87.6]  Yes   • PCOS (polycystic ovarian syndrome) [E28.2]  Yes      Resolved Hospital Problems   No resolved problems to display.     Procedures Performed       Consults     Date and Time Order Name Status Description    7/19/2020 0870 Inpatient Psychiatrist Consult Completed     7/17/2020 1964 Inpatient Gastroenterology  Consult Completed     7/17/2020 2123 [facility]JAR[facility] (on-call MD unless specified) Details Completed         Hospital Course  Please see discharge summary from 7/18/2020.  Right at the time of talk of discharge patient then started develop back with her emesis.  Again I think the patient was medically stable for discharge as all testing is negative and I think her nausea and vomiting is linked to underlying psychiatric stressors in her want to not go home.  She states she has had significant financial stressors and there is no air-conditioning in her home.  Regardless she is still deemed stable from discharge from GI perspective and I completely agree.  I did consult psychiatry while here over the last 24 hours and they have discontinued any bedside sitter and states the patient is stable for discharge as well from their perspective.  Patient can go back on her Seroquel once discharged and they recommend outpatient services.  Her blood pressure is falsely elevated secondary to her mood.  No additional medications will be utilized.  I think patient needs aggressive outpatient psychiatric help but otherwise does not require inpatient services.  Patient is very pleasant, there are concerns for additional seeking behavior for certain IV medications.      Condition on Discharge: Improved.     Temp:  [98 °F (36.7 °C)-98.8 °F (37.1 °C)] 98.8 °F (37.1 °C)  Heart Rate:  [] 89  Resp:  [16] 16  BP: (147-155)/() 152/107  Body mass index is 36.92 kg/m².    Physical Exam   Constitutional: She is oriented to person, place, and time. She appears well-developed.   HENT:   Head: Normocephalic.   Eyes: Conjunctivae are normal. No scleral icterus.   Neck: Neck supple. No JVD present.   Cardiovascular: Normal rate and regular rhythm.   Pulmonary/Chest: Effort normal and breath sounds normal. No respiratory distress.   Abdominal: Soft. Bowel sounds are normal. She exhibits no distension. There is no tenderness. There is no guarding.      Musculoskeletal: She exhibits no edema.   Neurological: She is alert and oriented to person, place, and time.        Discharge Medications      New Medications      Instructions Start Date   famotidine 20 MG tablet  Commonly known as:  PEPCID   20 mg, Oral, 2 Times Daily Before Meals         Changes to Medications      Instructions Start Date   promethazine 25 MG tablet  Commonly known as:  PHENERGAN  What changed:  Another medication with the same name was removed. Continue taking this medication, and follow the directions you see here.   25 mg, Oral, Every 6 Hours PRN         Continue These Medications      Instructions Start Date   albuterol sulfate  (90 Base) MCG/ACT inhaler  Commonly known as:  PROVENTIL HFA;VENTOLIN HFA;PROAIR HFA   2 puffs, Inhalation, Every 6 Hours PRN      ondansetron ODT 4 MG disintegrating tablet  Commonly known as:  Zofran ODT   4 mg, Translingual, Every 8 Hours PRN      PROBIOTIC-10 PO   1 tablet, Oral, Daily      QUEtiapine 50 MG tablet  Commonly known as:  SEROquel   150 mg, Oral, Nightly      venlafaxine XR 75 MG 24 hr capsule  Commonly known as:  EFFEXOR-XR   75 mg, Oral, Daily         Stop These Medications    pantoprazole 40 MG EC tablet  Commonly known as:  PROTONIX     sucralfate 1 g tablet  Commonly known as:  CARAFATE     sucralfate 1 GM/10ML suspension  Commonly known as:  CARAFATE             Additional Instructions for the Follow-ups that You Need to Schedule     Discharge Follow-up with PCP   As directed       Currently Documented PCP:    Arian Barnard APRN    PCP Phone Number:    [phone]     Follow Up Details:  PCP 2 to 3 weeks.  GI per the recommendations         Discharge Follow-up with PCP   As directed       Currently Documented PCP:    Arian Barnard APRN    PCP Phone Number:    [phone]     Follow Up Details:  PCP 1 to 2 weeks.  GI/psych per the recommendations           Follow-up Information     Arian Barnard APRN .    Specialties:  Family  Medicine, Nurse Practitioner  Why:  PCP 2 to 3 weeks.  GI per the recommendations  Contact information:  [address]  [address]  [facility] [address]  [phone]             Arian Barnard, APRN .    Specialties:  Family Medicine, Nurse Practitioner  Why:  PCP 1 to 2 weeks.  GI/psych per the recommendations  Contact information:  [address]  [address]  [facility] [address]  [phone]                 Test Results Pending at Discharge     Chris Shultz MD  07/20/20  09:26    Discharge time spent greater than 30 minutes.    Electronically signed by Chris Shultz MD at 07/20/20 0164

## 2020-07-21 ENCOUNTER — TRANSITIONAL CARE MANAGEMENT TELEPHONE ENCOUNTER (OUTPATIENT)
Dept: CALL CENTER | Facility: HOSPITAL | Age: 25
End: 2020-07-21

## 2020-07-21 NOTE — OUTREACH NOTE
Call Center TCM Note      Responses   [facility] patient discharged from?  [place]   COVID-19 Test Status  Not tested   Does the patient have one of the following disease processes/diagnoses(primary or secondary)?  Other   TCM attempt successful?  Yes   Call start time  1333   Call end time  1335   Discharge diagnosis  intractable vomiting   Meds reviewed with patient/caregiver?  Yes   Is the patient having any side effects they believe may be caused by any medication additions or changes?  No   Does the patient have all medications ordered at discharge?  Yes   Is the patient taking all medications as directed (includes completed medication regime)?  Yes   Comments regarding appointments  Appt with Dr. Morrison on 8/20/20   Does the patient have a primary care provider?   Yes   Does the patient have an appointment with their PCP within 7 days of discharge?  Greater than 7 days   Comments regarding PCP  7/28/20 at 1:00 pm   What is preventing the patient from scheduling follow up appointments within 7 days of discharge?  Earlier appointment not available   Nursing Interventions  Verified appointment date/time/provider   Has the patient kept scheduled appointments due by today?  N/A   Pulse Ox monitoring  None   Psychosocial issues?  No   Did the patient receive a copy of their discharge instructions?  Yes   Nursing interventions  Reviewed instructions with patient   What is the patient's perception of their health status since discharge?  Improving   Is the patient/caregiver able to teach back signs and symptoms related to disease process for when to call PCP?  Yes   Is the patient/caregiver able to teach back signs and symptoms related to disease process for when to call 911?  Yes   Is the patient/caregiver able to teach back the hierarchy of who to call/visit for symptoms/problems? PCP, Specialist, Home health nurse, Urgent Care, ED, 911  Yes   If the patient is a current smoker, are they able to teach back  resources for cessation?  Smoking cessation medications [Smoker]   TCM call completed?  Yes          Michelle Palm, SARA    7/21/2020, 13:36

## 2020-07-28 ENCOUNTER — READMISSION MANAGEMENT (OUTPATIENT)
Dept: CALL CENTER | Facility: HOSPITAL | Age: 25
End: 2020-07-28

## 2020-07-28 NOTE — OUTREACH NOTE
Medical Week 2 Survey      Responses   [facility] patient discharged from?  [place]   COVID-19 Test Status  Not tested   Does the patient have one of the following disease processes/diagnoses(primary or secondary)?  Other   Week 2 attempt successful?  No   Unsuccessful attempts  Attempt 1          Ronal Hoffman RN

## 2020-07-31 ENCOUNTER — READMISSION MANAGEMENT (OUTPATIENT)
Dept: CALL CENTER | Facility: HOSPITAL | Age: 25
End: 2020-07-31

## 2020-07-31 NOTE — OUTREACH NOTE
Medical Week 2 Survey      Responses   [facility]   [facility]OVID-19 Test Status  Not tested   Does the patient have one of the following disease processes/diagnoses(primary or secondary)?  Other   Week 2 attempt successful?  Yes   Call start time  1627   Discharge diagnosis  intractable vomiting   Call end time  1631   Is patient permission given to speak with other caregiver?  Yes   Person spoke with today (if not patient) and relationship  Mother   Meds reviewed with patient/caregiver?  Yes   Is the patient having any side effects they believe may be caused by any medication additions or changes?  No   Does the patient have all medications ordered at discharge?  Yes   Is the patient taking all medications as directed (includes completed medication regime)?  Yes   Does the patient have a primary care provider?   Yes   Does the patient have an appointment with their PCP within 7 days of discharge?  Yes   Has the patient kept scheduled appointments due by today?  Yes   Pulse Ox monitoring  None   Psychosocial issues?  No   Did the patient receive a copy of their discharge instructions?  Yes   Nursing interventions  Reviewed instructions with patient   What is the patient's perception of their health status since discharge?  Improving   Is the patient/caregiver able to teach back signs and symptoms related to disease process for when to call PCP?  Yes   Is the patient/caregiver able to teach back signs and symptoms related to disease process for when to call 911?  Yes   Is the patient/caregiver able to teach back the hierarchy of who to call/visit for symptoms/problems? PCP, Specialist, Home health nurse, Urgent Care, ED, 911  Yes   Additional teach back comments  Still w/ n/V, mom is worried about her inability to eat and drink.  Encouraged ice chips   Week 2 Call Completed?  Yes   Wrap up additional comments  Not much better per mom.          Teressa Cardenas RN

## 2020-08-13 ENCOUNTER — READMISSION MANAGEMENT (OUTPATIENT)
Dept: CALL CENTER | Facility: HOSPITAL | Age: 25
End: 2020-08-13

## 2020-08-13 NOTE — OUTREACH NOTE
Medical Week 4 Survey      Responses   [facility] patient discharged from?  [place]   COVID-19 Test Status  Not tested   Does the patient have one of the following disease processes/diagnoses(primary or secondary)?  Other   Week 4 attempt successful?  No          Nikki Mcfadden RN

## 2020-09-21 PROCEDURE — 99284 EMERGENCY DEPT VISIT MOD MDM: CPT

## 2020-09-21 PROCEDURE — 63710000001 ONDANSETRON ODT 4 MG TABLET DISPERSIBLE: Performed by: NURSE PRACTITIONER

## 2020-09-21 RX ORDER — ONDANSETRON 4 MG/1
4 TABLET, ORALLY DISINTEGRATING ORAL ONCE
Status: COMPLETED | OUTPATIENT
Start: 2020-09-21 | End: 2020-09-21

## 2020-09-21 RX ADMIN — ONDANSETRON 4 MG: 4 TABLET, ORALLY DISINTEGRATING ORAL at 22:19

## 2020-09-22 ENCOUNTER — HOSPITAL ENCOUNTER (EMERGENCY)
Facility: HOSPITAL | Age: 25
Discharge: HOME | End: 2020-09-22
Attending: EMERGENCY MEDICINE | Admitting: EMERGENCY MEDICINE

## 2020-09-22 ENCOUNTER — APPOINTMENT (OUTPATIENT)
Dept: GENERAL RADIOLOGY | Facility: HOSPITAL | Age: 25
End: 2020-09-22

## 2020-09-22 VITALS
TEMPERATURE: 98.5 F | HEIGHT: 64 IN | OXYGEN SATURATION: 98 % | BODY MASS INDEX: 36.36 KG/M2 | RESPIRATION RATE: 20 BRPM | SYSTOLIC BLOOD PRESSURE: 134 MMHG | DIASTOLIC BLOOD PRESSURE: 83 MMHG | HEART RATE: 93 BPM

## 2020-09-22 DIAGNOSIS — R11.2 NON-INTRACTABLE VOMITING WITH NAUSEA, UNSPECIFIED VOMITING TYPE: Primary | ICD-10-CM

## 2020-09-22 LAB
ALBUMIN SERPL-MCNC: 4.9 G/DL (ref 3.5–5.2)
ALBUMIN/GLOB SERPL: 1.5 G/DL
ALP SERPL-CCNC: 89 U/L (ref 39–117)
ALT SERPL W P-5'-P-CCNC: 16 U/L (ref 1–33)
ANION GAP SERPL CALCULATED.3IONS-SCNC: 13.8 MMOL/L (ref 5–15)
AST SERPL-CCNC: 16 U/L (ref 1–32)
BILIRUB SERPL-MCNC: 0.3 MG/DL (ref 0–1.2)
BUN SERPL-MCNC: 13 MG/DL (ref 6–20)
BUN/CREAT SERPL: 14.9 (ref 7–25)
CALCIUM SPEC-SCNC: 9.4 MG/DL (ref 8.6–10.5)
CHLORIDE SERPL-SCNC: 100 MMOL/L (ref 98–107)
CO2 SERPL-SCNC: 26.2 MMOL/L (ref 22–29)
CREAT SERPL-MCNC: 0.87 MG/DL (ref 0.57–1)
DEPRECATED RDW RBC AUTO: 42.7 FL (ref 37–54)
ERYTHROCYTE [DISTWIDTH] IN BLOOD BY AUTOMATED COUNT: 17.1 % (ref 12.3–15.4)
GFR SERPL CREATININE-BSD FRML MDRD: 81 ML/MIN/1.73
GLOBULIN UR ELPH-MCNC: 3.3 GM/DL
GLUCOSE SERPL-MCNC: 125 MG/DL (ref 65–99)
HCG SERPL QL: NEGATIVE
HCT VFR BLD AUTO: 36.1 % (ref 34–46.6)
HGB BLD-MCNC: 10.8 G/DL (ref 12–15.9)
HYPOCHROMIA BLD QL: ABNORMAL
LIPASE SERPL-CCNC: 19 U/L (ref 13–60)
LYMPHOCYTES # BLD MANUAL: 0.27 10*3/MM3 (ref 0.7–3.1)
LYMPHOCYTES NFR BLD MANUAL: 2 % (ref 19.6–45.3)
LYMPHOCYTES NFR BLD MANUAL: 6 % (ref 5–12)
MCH RBC QN AUTO: 21.3 PG (ref 26.6–33)
MCHC RBC AUTO-ENTMCNC: 29.9 G/DL (ref 31.5–35.7)
MCV RBC AUTO: 71.1 FL (ref 79–97)
MICROCYTES BLD QL: ABNORMAL
MONOCYTES # BLD AUTO: 0.81 10*3/MM3 (ref 0.1–0.9)
NEUTROPHILS # BLD AUTO: 12.48 10*3/MM3 (ref 1.7–7)
NEUTROPHILS NFR BLD MANUAL: 92 % (ref 42.7–76)
PLAT MORPH BLD: NORMAL
PLATELET # BLD AUTO: 515 10*3/MM3 (ref 140–450)
PMV BLD AUTO: 10.3 FL (ref 6–12)
POLYCHROMASIA BLD QL SMEAR: ABNORMAL
POTASSIUM SERPL-SCNC: 3.3 MMOL/L (ref 3.5–5.2)
PROT SERPL-MCNC: 8.2 G/DL (ref 6–8.5)
RBC # BLD AUTO: 5.08 10*6/MM3 (ref 3.77–5.28)
SODIUM SERPL-SCNC: 140 MMOL/L (ref 136–145)
WBC # BLD AUTO: 13.56 10*3/MM3 (ref 3.4–10.8)
WBC MORPH BLD: NORMAL

## 2020-09-22 PROCEDURE — 96361 HYDRATE IV INFUSION ADD-ON: CPT

## 2020-09-22 PROCEDURE — 96375 TX/PRO/DX INJ NEW DRUG ADDON: CPT

## 2020-09-22 PROCEDURE — 25010000002 ONDANSETRON PER 1 MG: Performed by: EMERGENCY MEDICINE

## 2020-09-22 PROCEDURE — 80053 COMPREHEN METABOLIC PANEL: CPT | Performed by: EMERGENCY MEDICINE

## 2020-09-22 PROCEDURE — 84703 CHORIONIC GONADOTROPIN ASSAY: CPT | Performed by: EMERGENCY MEDICINE

## 2020-09-22 PROCEDURE — 25010000002 HALOPERIDOL LACTATE PER 5 MG: Performed by: EMERGENCY MEDICINE

## 2020-09-22 PROCEDURE — 25010000002 METOCLOPRAMIDE PER 10 MG: Performed by: EMERGENCY MEDICINE

## 2020-09-22 PROCEDURE — 85007 BL SMEAR W/DIFF WBC COUNT: CPT | Performed by: EMERGENCY MEDICINE

## 2020-09-22 PROCEDURE — 83690 ASSAY OF LIPASE: CPT | Performed by: EMERGENCY MEDICINE

## 2020-09-22 PROCEDURE — 25010000002 DIPHENHYDRAMINE PER 50 MG: Performed by: EMERGENCY MEDICINE

## 2020-09-22 PROCEDURE — 85025 COMPLETE CBC W/AUTO DIFF WBC: CPT | Performed by: EMERGENCY MEDICINE

## 2020-09-22 PROCEDURE — 71045 X-RAY EXAM CHEST 1 VIEW: CPT

## 2020-09-22 PROCEDURE — 96374 THER/PROPH/DIAG INJ IV PUSH: CPT

## 2020-09-22 RX ORDER — OMEPRAZOLE 20 MG/1
20 CAPSULE, DELAYED RELEASE ORAL DAILY
Qty: 30 CAPSULE | Refills: 0 | Status: SHIPPED | OUTPATIENT
Start: 2020-09-22 | End: 2020-10-22 | Stop reason: SDUPTHER

## 2020-09-22 RX ORDER — FAMOTIDINE 10 MG/ML
20 INJECTION, SOLUTION INTRAVENOUS ONCE
Status: COMPLETED | OUTPATIENT
Start: 2020-09-22 | End: 2020-09-22

## 2020-09-22 RX ORDER — METOCLOPRAMIDE HYDROCHLORIDE 5 MG/ML
10 INJECTION INTRAMUSCULAR; INTRAVENOUS ONCE
Status: COMPLETED | OUTPATIENT
Start: 2020-09-22 | End: 2020-09-22

## 2020-09-22 RX ORDER — DIPHENHYDRAMINE HYDROCHLORIDE 50 MG/ML
25 INJECTION INTRAMUSCULAR; INTRAVENOUS ONCE
Status: COMPLETED | OUTPATIENT
Start: 2020-09-22 | End: 2020-09-22

## 2020-09-22 RX ORDER — HALOPERIDOL 5 MG/ML
2 INJECTION INTRAMUSCULAR ONCE
Status: COMPLETED | OUTPATIENT
Start: 2020-09-22 | End: 2020-09-22

## 2020-09-22 RX ORDER — SODIUM CHLORIDE 0.9 % (FLUSH) 0.9 %
10 SYRINGE (ML) INJECTION AS NEEDED
Status: DISCONTINUED | OUTPATIENT
Start: 2020-09-22 | End: 2020-09-22 | Stop reason: HOSPADM

## 2020-09-22 RX ORDER — ONDANSETRON 4 MG/1
4 TABLET, ORALLY DISINTEGRATING ORAL EVERY 8 HOURS PRN
Qty: 10 TABLET | Refills: 0 | Status: SHIPPED | OUTPATIENT
Start: 2020-09-22 | End: 2021-08-17

## 2020-09-22 RX ORDER — ONDANSETRON 2 MG/ML
4 INJECTION INTRAMUSCULAR; INTRAVENOUS ONCE
Status: COMPLETED | OUTPATIENT
Start: 2020-09-22 | End: 2020-09-22

## 2020-09-22 RX ORDER — DEXTROSE, SODIUM CHLORIDE, AND POTASSIUM CHLORIDE 5; .9; .15 G/100ML; G/100ML; G/100ML
999 INJECTION INTRAVENOUS CONTINUOUS
Status: DISCONTINUED | OUTPATIENT
Start: 2020-09-22 | End: 2020-09-22 | Stop reason: HOSPADM

## 2020-09-22 RX ADMIN — POTASSIUM CHLORIDE, DEXTROSE MONOHYDRATE AND SODIUM CHLORIDE 999 ML/HR: 150; 5; 900 INJECTION, SOLUTION INTRAVENOUS at 01:49

## 2020-09-22 RX ADMIN — ONDANSETRON 4 MG: 2 INJECTION INTRAMUSCULAR; INTRAVENOUS at 01:44

## 2020-09-22 RX ADMIN — FAMOTIDINE 20 MG: 10 INJECTION INTRAVENOUS at 01:44

## 2020-09-22 RX ADMIN — DIPHENHYDRAMINE HYDROCHLORIDE 25 MG: 50 INJECTION, SOLUTION INTRAMUSCULAR; INTRAVENOUS at 02:41

## 2020-09-22 RX ADMIN — METOCLOPRAMIDE HYDROCHLORIDE 10 MG: 5 INJECTION INTRAMUSCULAR; INTRAVENOUS at 02:42

## 2020-09-22 RX ADMIN — SODIUM CHLORIDE, PRESERVATIVE FREE 10 ML: 5 INJECTION INTRAVENOUS at 00:52

## 2020-09-22 RX ADMIN — SODIUM CHLORIDE 1000 ML: 9 INJECTION, SOLUTION INTRAVENOUS at 00:54

## 2020-09-22 RX ADMIN — HALOPERIDOL LACTATE 2 MG: 5 INJECTION, SOLUTION INTRAMUSCULAR at 02:44

## 2020-09-22 NOTE — ED NOTES
This RN wore gloves, mask, eye protection and all other necessary PPE while performing pt care.     Payton Hoffman, SARA  09/22/20 0037

## 2020-09-22 NOTE — ED NOTES
Pt ambulatory to triage from home with c/o n/v and intermittent abdominal pain. Started yesterday morning.  States drinking pedialyte but not able to hold it down.  Pt Pt states drank 1-2 drinks etoh on Saturday night. Thinks may have ulcer. Pt provided with mask in triage.  Triage personnel wore appropriate PPE.       Ginger Obando, RN  09/21/20 2025

## 2020-09-22 NOTE — ED NOTES
"Pt reporting to triage staff that she cannot breathe. Pt states \"I am having a panic attack\". Pt reassured that her O2 sat is WNL.      Alicia Corbin RN  09/21/20 4068    "

## 2020-09-22 NOTE — ED PROVIDER NOTES
EMERGENCY DEPARTMENT ENCOUNTER    Room Number:  24/24  Date seen:  9/22/2020  PCP: Arian Barnard APRN  Historian: Patient      HPI:  Chief Complaint: Vomiting  A complete HPI/ROS/PMH/PSH/SH/FH are unobtainable due to: Nothing  Context: Alicia Powell is a 24 y.o. female who presents to the ED c/o intractable vomiting since Sunday morning.  She reports that she had been out drinking alcohol with friends on Saturday night.  She also smokes marijuana on occasion.  She reports that she is not been able to stop vomiting and she has been having some chest and upper abdominal discomfort secondary to vomiting so much.  She also reports some mild shortness of air.  She usually uses an albuterol inhaler as needed due to history of asthma.  She denies fever or chills.  She denies dysuria.            PAST MEDICAL HISTORY  Active Ambulatory Problems     Diagnosis Date Noted   • PCOS (polycystic ovarian syndrome) 05/06/2016   • Hirsutism 05/06/2016   • Obesity 05/06/2016   • Nausea 07/13/2018   • Pain of upper abdomen 07/13/2018   • Non-intractable vomiting with nausea 07/13/2018   • Intractable vomiting 07/17/2020   • Obesity (BMI 30-39.9) 07/17/2020   • GERD (gastroesophageal reflux disease) 07/17/2020   • Anxiety 07/17/2020   • Asthma 07/17/2020   • Tobacco abuse 07/17/2020   • Hypokalemia 07/17/2020     Resolved Ambulatory Problems     Diagnosis Date Noted   • Abnormal uterine bleeding (AUB) 05/06/2016   • ASCUS of cervix with negative high risk HPV 08/01/2018     Past Medical History:   Diagnosis Date   • Anemia    • Depression    • Dizziness    • H/O ETOH abuse    • Multiple URI    • Pneumonia    • Viral infection          PAST SURGICAL HISTORY  Past Surgical History:   Procedure Laterality Date   • DENTAL PROCEDURE     • ENDOSCOPY N/A 7/14/2018    Procedure: ESOPHAGOGASTRODUODENOSCOPY WITH COLD BIOPSIES;  Surgeon: Damon Morrison MD;  Location: [facility] ENDOSCOPY;  Service: Gastroenterology   • VAGINAL SEPTUM RESECTION   2012    Excision   • WISDOM TOOTH EXTRACTION  02/2016         FAMILY HISTORY  Family History   Problem Relation Age of Onset   • Anxiety disorder Mother    • Depression Mother    • CALDERON disease Mother    • Asthma Mother    • Mental illness Mother    • Hypertension Maternal Grandmother    • Hyperlipidemia Maternal Grandmother    • Other Maternal Grandmother         GERD   • Arthritis Maternal Grandmother    • COPD Maternal Grandmother    • Depression Maternal Grandmother    • Heart disease Maternal Grandmother    • Mental illness Maternal Grandmother    • CALDERON disease Father    • Pancreatitis Father    • Alcohol abuse Father    • Asthma Father    • Heart disease Maternal Grandfather    • Heart disease Paternal Grandfather    • Mental retardation Maternal Uncle    • Breast cancer Neg Hx    • Ovarian cancer Neg Hx    • Uterine cancer Neg Hx    • Colon cancer Neg Hx          SOCIAL HISTORY  Social History     Socioeconomic History   • Marital status: Single     Spouse name: Not on file   • Number of children: 0   • Years of education: Not on file   • Highest education level: Not on file   Occupational History   • Occupation: unemployed   Tobacco Use   • Smoking status: Current Some Day Smoker     Types: Cigars   • Smokeless tobacco: Never Used   • Tobacco comment: infreq   Substance and Sexual Activity   • Alcohol use: Yes     Frequency: 2-4 times a month     Drinks per session: 3 or 4     Comment: social    • Drug use: Yes     Types: Marijuana   • Sexual activity: Yes     Partners: Male   Social History Narrative    Last Pelvic/PAP 2016         ALLERGIES  Acetaminophen        REVIEW OF SYSTEMS  Review of Systems   Review of all 14 systems is negative other than stated in the HPI above.      PHYSICAL EXAM  ED Triage Vitals   Temp Heart Rate Resp BP SpO2   09/21/20 2026 09/21/20 2026 09/21/20 2026 09/22/20 0002 09/21/20 2026   98.5 °F (36.9 °C) (!) 137 18 160/97 100 %      Temp src Heart Rate Source Patient Position BP  Location FiO2 (%)   09/21/20 2026 09/21/20 2026 09/22/20 0002 09/22/20 0002 --   Tympanic Monitor Sitting Left arm          GENERAL: Awake and alert, appears uncomfortable but in no acute distress  HENT: nares patent  EYES: no scleral icterus, pupils 3 mm and reactive bilaterally  CV: regular rhythm, normal rate, no murmur  RESPIRATORY: normal effort, lungs clear to auscultation bilaterally without wheezing  ABDOMEN: soft, nondistended, nontender throughout  MUSCULOSKELETAL: no deformity  NEURO: alert, moves all extremities, follows commands  PSYCH:  calm, cooperative  SKIN: warm, dry    Vital signs and nursing notes reviewed.          LAB RESULTS  Recent Results (from the past 24 hour(s))   Comprehensive Metabolic Panel    Collection Time: 09/22/20 12:52 AM    Specimen: Blood   Result Value Ref Range    Glucose 125 (H) 65 - 99 mg/dL    BUN 13 6 - 20 mg/dL    Creatinine 0.87 0.57 - 1.00 mg/dL    Sodium 140 136 - 145 mmol/L    Potassium 3.3 (L) 3.5 - 5.2 mmol/L    Chloride 100 98 - 107 mmol/L    CO2 26.2 22.0 - 29.0 mmol/L    Calcium 9.4 8.6 - 10.5 mg/dL    Total Protein 8.2 6.0 - 8.5 g/dL    Albumin 4.90 3.50 - 5.20 g/dL    ALT (SGPT) 16 1 - 33 U/L    AST (SGOT) 16 1 - 32 U/L    Alkaline Phosphatase 89 39 - 117 U/L    Total Bilirubin 0.3 0.0 - 1.2 mg/dL    eGFR Non African Amer 81 >60 mL/min/1.73    Globulin 3.3 gm/dL    A/G Ratio 1.5 g/dL    BUN/Creatinine Ratio 14.9 7.0 - 25.0    Anion Gap 13.8 5.0 - 15.0 mmol/L   Lipase    Collection Time: 09/22/20 12:52 AM    Specimen: Blood   Result Value Ref Range    Lipase 19 13 - 60 U/L   hCG, Serum, Qualitative    Collection Time: 09/22/20 12:52 AM    Specimen: Blood   Result Value Ref Range    HCG Qualitative Negative Negative   CBC Auto Differential    Collection Time: 09/22/20 12:52 AM    Specimen: Blood   Result Value Ref Range    WBC 13.56 (H) 3.40 - 10.80 10*3/mm3    RBC 5.08 3.77 - 5.28 10*6/mm3    Hemoglobin 10.8 (L) 12.0 - 15.9 g/dL    Hematocrit 36.1 34.0 - 46.6 %     MCV 71.1 (L) 79.0 - 97.0 fL    MCH 21.3 (L) 26.6 - 33.0 pg    MCHC 29.9 (L) 31.5 - 35.7 g/dL    RDW 17.1 (H) 12.3 - 15.4 %    RDW-SD 42.7 37.0 - 54.0 fl    MPV 10.3 6.0 - 12.0 fL    Platelets 515 (H) 140 - 450 10*3/mm3   Manual Differential    Collection Time: 09/22/20 12:52 AM    Specimen: Blood   Result Value Ref Range    Neutrophil % 92.0 (H) 42.7 - 76.0 %    Lymphocyte % 2.0 (L) 19.6 - 45.3 %    Monocyte % 6.0 5.0 - 12.0 %    Neutrophils Absolute 12.48 (H) 1.70 - 7.00 10*3/mm3    Lymphocytes Absolute 0.27 (L) 0.70 - 3.10 10*3/mm3    Monocytes Absolute 0.81 0.10 - 0.90 10*3/mm3    Hypochromia Slight/1+ None Seen    Microcytes Slight/1+ None Seen    Polychromasia Slight/1+ None Seen    WBC Morphology Normal Normal    Platelet Morphology Normal Normal       Ordered the above labs and reviewed the results.        RADIOLOGY  Xr Chest 1 View    Result Date: 9/22/2020  PORTABLE CHEST RADIOGRAPH  HISTORY: Vomiting and shortness of air  COMPARISON: None available.  FINDINGS: Heart size is within normal limits for portable technique. No pneumothorax, pleural effusion, or acute infiltrate is seen. No free air is seen beneath the diaphragm.      No acute findings.  This report was finalized on 9/22/2020 2:15 AM by Dr. Ysabel Renee M.D.        Ordered the above noted radiological studies. Reviewed by me in PACS.            PROCEDURES  Procedures              MEDICATIONS GIVEN IN ER  Medications   sodium chloride 0.9 % flush 10 mL (10 mL Intravenous Given 9/22/20 0052)   dextrose 5 % and sodium chloride 0.9 % with KCl 20 mEq/L infusion (0 mL/hr Intravenous Stopped 9/22/20 0308)   ondansetron ODT (ZOFRAN-ODT) disintegrating tablet 4 mg (4 mg Oral Given 9/21/20 3079)   sodium chloride 0.9 % bolus 1,000 mL ( Intravenous Currently Infusing 9/22/20 0247)   ondansetron (ZOFRAN) injection 4 mg (4 mg Intravenous Given 9/22/20 0144)   famotidine (PEPCID) injection 20 mg (20 mg Intravenous Given 9/22/20 0144)   haloperidol  lactate (HALDOL) injection 2 mg (2 mg Intravenous Given 9/22/20 0244)   metoclopramide (REGLAN) injection 10 mg (10 mg Intravenous Given 9/22/20 0242)   diphenhydrAMINE (BENADRYL) injection 25 mg (25 mg Intravenous Given 9/22/20 0241)                   MEDICAL DECISION MAKING, PROGRESS, and CONSULTS    All labs have been independently reviewed by me.  All radiology studies have been reviewed by me and discussed with radiologist dictating the report.   EKG's independently viewed and interpreted by me.  Discussion below represents my analysis of pertinent findings related to patient's condition, differential diagnosis, treatment plan and final disposition.    ED Course as of Sep 22 0408   Tue Sep 22, 2020   0143 24-year-old female presents with persistent nausea and vomiting for the last 36 hours after drinking excess amount of alcohol on Saturday night.  Differential diagnosis includes pancreatitis, alcoholic gastritis, acute gastroenteritis.  She was given IV Pepcid, Zofran, and IV fluids containing potassium due to potassium of 3.3.  Serum pregnancy is negative.  Lipase is normal.    [JR]   0158 I personally reviewed the chest x-ray which shows no infiltrate, no pneumothorax or pneumomediastinum.    [JR]   0232 Patient reassessed.  I explained reassuring laboratory findings.  Her abdominal exam remains benign but she does not feel comfortable trying to p.o. challenge at this time.  I will order additional antiemetics.    [JR]   0348 Patient reassessed and reports feeling some better.  She was p.o. challenged with water at this time.    [JR]   0404 Patient tolerated p.o.  I discussed plan to discharge home with omeprazole daily, Zofran PRN, GI follow-up if symptoms persist.  I discussed that she should use a clear liquid diet for the next 24 hours and then slowly advance her diet.    [JR]      ED Course User Index  [JR] Kali Moura MD              I wore a surgical mask, gloves, and eye protection during  this patient encounter.  Patient also wearing a surgical mask.  Hand hygeine performed before and after seeing the patient.    DIAGNOSIS  Final diagnoses:   Non-intractable vomiting with nausea, unspecified vomiting type         DISPOSITION  DISCHARGE    Patient discharged in stable condition.    Reviewed implications of results, diagnosis, meds, responsibility to follow up, warning signs and symptoms of possible worsening, potential complications and reasons to return to ER.    Patient/Family voiced understanding of above instructions.    Discussed plan for discharge, as there is no emergent indication for admission. Patient referred to primary care provider for BP management due to today's BP. Pt/family is agreeable and understands need for follow up and repeat testing.  Pt is aware that discharge does not mean that nothing is wrong but it indicates no emergency is present that requires admission and they must continue care with follow-up as given below or physician of their choice.     FOLLOW-UP  Arian Barnard, APRN  [address]  [facility] [id number] [phone]      As needed    Axel Ferrell MD  [address]  [place] [id number]  [phone]      As needed         Medication List      New Prescriptions    omeprazole 20 MG capsule  Commonly known as: priLOSEC  Take 1 capsule by mouth Daily.        Changed    ondansetron ODT 4 MG disintegrating tablet  Commonly known as: ZOFRAN-ODT  Place 1 tablet on the tongue Every 8 (Eight) Hours As Needed for Nausea.  What changed: reasons to take this           Where to Get Your Medications      These medications were sent to [facility]/pharmacy #[id number] - [place], [place] - [address] - [phone]  - [phone] FX  [address] [address]    Hours: 24-hours Phone: [phone]   · omeprazole 20 MG capsule  · ondansetron ODT 4 MG disintegrating tablet                   Latest Documented Vital Signs:  As of 04:08  EDT  BP- 134/83 HR- 93 Temp- 98.5 °F (36.9 °C) (Tympanic) O2 sat- 98%        --    Please note that portions of this were completed with a voice recognition program.          Kali Moura MD  09/22/20 2591

## 2020-10-22 ENCOUNTER — OFFICE VISIT (OUTPATIENT)
Dept: FAMILY MEDICINE CLINIC | Facility: CLINIC | Age: 25
End: 2020-10-22

## 2020-10-22 VITALS
OXYGEN SATURATION: 100 % | TEMPERATURE: 97.3 F | SYSTOLIC BLOOD PRESSURE: 136 MMHG | WEIGHT: 219.6 LBS | DIASTOLIC BLOOD PRESSURE: 84 MMHG | BODY MASS INDEX: 37.49 KG/M2 | HEIGHT: 64 IN | HEART RATE: 132 BPM

## 2020-10-22 DIAGNOSIS — L03.311 CELLULITIS OF ABDOMINAL WALL: ICD-10-CM

## 2020-10-22 DIAGNOSIS — J45.20 MILD INTERMITTENT REACTIVE AIRWAY DISEASE WITHOUT COMPLICATION: ICD-10-CM

## 2020-10-22 DIAGNOSIS — K21.00 GASTROESOPHAGEAL REFLUX DISEASE WITH ESOPHAGITIS WITHOUT HEMORRHAGE: ICD-10-CM

## 2020-10-22 DIAGNOSIS — F41.9 ANXIETY: ICD-10-CM

## 2020-10-22 DIAGNOSIS — F31.81 BIPOLAR 2 DISORDER (HCC): Primary | ICD-10-CM

## 2020-10-22 PROCEDURE — 99214 OFFICE O/P EST MOD 30 MIN: CPT | Performed by: NURSE PRACTITIONER

## 2020-10-22 RX ORDER — FAMOTIDINE 20 MG/1
20 TABLET, FILM COATED ORAL
Qty: 60 TABLET | Refills: 2 | Status: SHIPPED | OUTPATIENT
Start: 2020-10-22 | End: 2021-09-27

## 2020-10-22 RX ORDER — VENLAFAXINE HYDROCHLORIDE 75 MG/1
75 CAPSULE, EXTENDED RELEASE ORAL DAILY
Qty: 30 CAPSULE | Refills: 2 | Status: SHIPPED | OUTPATIENT
Start: 2020-10-22 | End: 2021-03-08 | Stop reason: SDUPTHER

## 2020-10-22 RX ORDER — ALBUTEROL SULFATE 90 UG/1
2 AEROSOL, METERED RESPIRATORY (INHALATION) EVERY 6 HOURS PRN
Qty: 6.7 G | Refills: 2 | Status: SHIPPED | OUTPATIENT
Start: 2020-10-22 | End: 2021-09-27 | Stop reason: SDUPTHER

## 2020-10-22 RX ORDER — OMEPRAZOLE 20 MG/1
20 CAPSULE, DELAYED RELEASE ORAL DAILY
Qty: 30 CAPSULE | Refills: 2 | Status: SHIPPED | OUTPATIENT
Start: 2020-10-22 | End: 2021-03-31 | Stop reason: SDUPTHER

## 2020-10-22 RX ORDER — SULFAMETHOXAZOLE AND TRIMETHOPRIM 800; 160 MG/1; MG/1
1 TABLET ORAL 2 TIMES DAILY
Qty: 20 TABLET | Refills: 0 | Status: SHIPPED | OUTPATIENT
Start: 2020-10-22 | End: 2021-03-31

## 2020-10-22 RX ORDER — QUETIAPINE FUMARATE 200 MG/1
200 TABLET, FILM COATED ORAL NIGHTLY
Qty: 30 TABLET | Refills: 2 | Status: SHIPPED | OUTPATIENT
Start: 2020-10-22 | End: 2021-01-28

## 2020-10-22 NOTE — PROGRESS NOTES
Subjective   Alicia Powell is a 25 y.o. female.     Chief Complaint   Patient presents with   • Acne     infected pimple on stomach   • Med Refill        History of Present Illness     Patient is here today with complaint of pimple on stomach that seems to be infected.  Noticed the first of the week.  Has used hot compresses and it popped.  Dead skin came out of it and she is worried about infection.       GERD-pepcid and omeprazole working well for her  ASTHMA: albuterol inhaler use about 5 times weekly, sometimes less.    MOOD: well controlled with venalafaxine and seroquel.  Has increased herself to 200mg of seroquel about a month ago for mood and sleep and is working well for her.  Denies any side effects.         The following portions of the patient's history were reviewed and updated as appropriate: allergies, current medications, past family history, past medical history, past social history, past surgical history and problem list.    Past Medical History:   Diagnosis Date   • Abnormal uterine bleeding (AUB) 5/6/2016   • Anemia    • Anxiety    • ASCUS of cervix with negative high risk HPV 8/1/2018   • Asthma    • Depression    • Dizziness    • GERD (gastroesophageal reflux disease)    • H/O ETOH abuse    • Multiple URI    • PCOS (polycystic ovarian syndrome) 5/6/2016   • PCOS (polycystic ovarian syndrome)    • Pneumonia    • Tobacco abuse 7/17/2020   • Viral infection        Past Surgical History:   Procedure Laterality Date   • DENTAL PROCEDURE     • ENDOSCOPY N/A 7/14/2018    Procedure: ESOPHAGOGASTRODUODENOSCOPY WITH COLD BIOPSIES;  Surgeon: Damon Morrison MD;  Location: [facility] ENDOSCOPY;  Service: Gastroenterology   • VAGINAL SEPTUM RESECTION  2012    Excision   • WISDOM TOOTH EXTRACTION  02/2016       Family History   Problem Relation Age of Onset   • Anxiety disorder Mother    • Depression Mother    • CALDERON disease Mother    • Asthma Mother    • Mental illness Mother    • Hypertension Maternal  Grandmother    • Hyperlipidemia Maternal Grandmother    • Other Maternal Grandmother         GERD   • Arthritis Maternal Grandmother    • COPD Maternal Grandmother    • Depression Maternal Grandmother    • Heart disease Maternal Grandmother    • Mental illness Maternal Grandmother    • CALDERON disease Father    • Pancreatitis Father    • Alcohol abuse Father    • Asthma Father    • Heart disease Maternal Grandfather    • Heart disease Paternal Grandfather    • Mental retardation Maternal Uncle    • Breast cancer Neg Hx    • Ovarian cancer Neg Hx    • Uterine cancer Neg Hx    • Colon cancer Neg Hx        Social History     Socioeconomic History   • Marital status: Single     Spouse name: Not on file   • Number of children: 0   • Years of education: Not on file   • Highest education level: Not on file   Occupational History   • Occupation: unemployed   Tobacco Use   • Smoking status: Current Some Day Smoker     Types: Cigars   • Smokeless tobacco: Never Used   • Tobacco comment: infreq   Substance and Sexual Activity   • Alcohol use: Yes     Frequency: 2-4 times a month     Drinks per session: 3 or 4     Comment: social    • Drug use: Yes     Types: Marijuana   • Sexual activity: Yes     Partners: Male   Social History Narrative    Last Pelvic/PAP 2016         Current Outpatient Medications:   •  albuterol sulfate  (90 Base) MCG/ACT inhaler, Inhale 2 puffs Every 6 (Six) Hours As Needed for Wheezing or Shortness of Air., Disp: 6.7 g, Rfl: 2  •  calcium carbonate (TUMS) 500 MG chewable tablet, Chew 1 tablet Daily., Disp: , Rfl:   •  famotidine (PEPCID) 20 MG tablet, Take 1 tablet by mouth 2 (Two) Times a Day Before Meals., Disp: 60 tablet, Rfl: 2  •  omeprazole (priLOSEC) 20 MG capsule, Take 1 capsule by mouth Daily., Disp: 30 capsule, Rfl: 2  •  ondansetron ODT (ZOFRAN-ODT) 4 MG disintegrating tablet, Place 1 tablet on the tongue Every 8 (Eight) Hours As Needed for Nausea., Disp: 10 tablet, Rfl: 0  •  Probiotic  "Product (PROBIOTIC-10 PO), Take 1 tablet by mouth Daily., Disp: , Rfl:   •  QUEtiapine (SEROquel) 200 MG tablet, Take 1 tablet by mouth Every Night., Disp: 30 tablet, Rfl: 2  •  venlafaxine XR (EFFEXOR-XR) 75 MG 24 hr capsule, Take 1 capsule by mouth Daily., Disp: 30 capsule, Rfl: 2  •  sulfamethoxazole-trimethoprim (Bactrim DS) 800-160 MG per tablet, Take 1 tablet by mouth 2 (Two) Times a Day., Disp: 20 tablet, Rfl: 0    Current Facility-Administered Medications:   •  cyanocobalamin injection 1,000 mcg, 1,000 mcg, Intramuscular, Q28 Days, Dania Alston PA, 1,000 mcg at 12/18/18 1440    Review of Systems   Constitutional: Negative for fatigue and fever.   Respiratory: Negative for cough, shortness of breath and wheezing.    Cardiovascular: Negative for chest pain.   Gastrointestinal: Negative for abdominal pain, constipation, diarrhea, nausea and vomiting.   Genitourinary: Negative for dysuria and urgency.   Skin: Positive for skin lesions (midline below abdominal wall).   Neurological: Negative for dizziness and headache.   Psychiatric/Behavioral: Negative for suicidal ideas and depressed mood. The patient is not nervous/anxious.        Objective   Vitals:    10/22/20 1054   BP: 136/84   Pulse: (!) 132   Temp: 97.3 °F (36.3 °C)   SpO2: 100%   Weight: 99.6 kg (219 lb 9.6 oz)   Height: 162.6 cm (64\")     Body mass index is 37.69 kg/m².  Physical Exam  Constitutional:       Appearance: Normal appearance.   Cardiovascular:      Rate and Rhythm: Normal rate and regular rhythm.      Pulses: Normal pulses.   Pulmonary:      Effort: Pulmonary effort is normal.      Breath sounds: Normal breath sounds.   Skin:     General: Skin is warm and dry.          Neurological:      Mental Status: She is alert.   Psychiatric:         Mood and Affect: Mood normal.         Behavior: Behavior normal.         Thought Content: Thought content normal.         Judgment: Judgment normal.           Assessment/Plan   Diagnoses and all orders " for this visit:    1. Bipolar 2 disorder (CMS/[facility]) (Primary)    2. Mild intermittent reactive airway disease without complication  -     albuterol sulfate  (90 Base) MCG/ACT inhaler; Inhale 2 puffs Every 6 (Six) Hours As Needed for Wheezing or Shortness of Air.  Dispense: 6.7 g; Refill: 2    3. Anxiety    4. Gastroesophageal reflux disease with esophagitis without hemorrhage    5. Cellulitis of abdominal wall    Other orders  -     famotidine (PEPCID) 20 MG tablet; Take 1 tablet by mouth 2 (Two) Times a Day Before Meals.  Dispense: 60 tablet; Refill: 2  -     omeprazole (priLOSEC) 20 MG capsule; Take 1 capsule by mouth Daily.  Dispense: 30 capsule; Refill: 2  -     QUEtiapine (SEROquel) 200 MG tablet; Take 1 tablet by mouth Every Night.  Dispense: 30 tablet; Refill: 2  -     venlafaxine XR (EFFEXOR-XR) 75 MG 24 hr capsule; Take 1 capsule by mouth Daily.  Dispense: 30 capsule; Refill: 2  -     sulfamethoxazole-trimethoprim (Bactrim DS) 800-160 MG per tablet; Take 1 tablet by mouth 2 (Two) Times a Day.  Dispense: 20 tablet; Refill: 0      Bipolar-increase Seroquel dosing.  Continue venlafaxine.    Asthma-continue as needed use of albuterol.  If worsening breathing notify provider will reevaluate.  GERD continue medications.  We discussed dietary changes.-    Cellulitis we will treat abdomen.  Keep area covered with Band-Aid when out in the open.  L open allowed to air when she is at home at night.  If this is not resolved follow-up with provider for further evaluation.-Patient verbalizes understanding.           There are no Patient Instructions on file for this visit.

## 2021-01-28 RX ORDER — QUETIAPINE FUMARATE 200 MG/1
TABLET, FILM COATED ORAL
Qty: 30 TABLET | Refills: 0 | Status: SHIPPED | OUTPATIENT
Start: 2021-01-28 | End: 2021-03-08

## 2021-03-08 NOTE — TELEPHONE ENCOUNTER
Caller: Alicia Powell    Relationship: Self    Best call back number: [phone]    Medication needed:   Requested Prescriptions     Pending Prescriptions Disp Refills   • QUEtiapine (SEROquel) 200 MG tablet [Pharmacy Med Name: QUEtiapine Fumarate 200 MG Oral Tablet] 30 tablet 2     Sig: Take 1 tablet by mouth nightly   • venlafaxine XR (EFFEXOR-XR) 75 MG 24 hr capsule 30 capsule 2     Sig: Take 1 capsule by mouth Daily.       When do you need the refill by: 3/9/2021    What details did the patient provide when requesting the medication: PATIENT IS OUT OF MEDICATION. SHE IS CALLING BACK TO CHECK ON STATUS.     Does the patient have less than a 3 day supply:  [x] Yes  [] No    What is the patient's preferred pharmacy: [address] [address] [facility] [phone] [facility] [phone]

## 2021-03-09 RX ORDER — QUETIAPINE FUMARATE 200 MG/1
TABLET, FILM COATED ORAL
Qty: 30 TABLET | Refills: 0 | Status: SHIPPED | OUTPATIENT
Start: 2021-03-09 | End: 2021-03-31 | Stop reason: SDUPTHER

## 2021-03-09 RX ORDER — VENLAFAXINE HYDROCHLORIDE 75 MG/1
75 CAPSULE, EXTENDED RELEASE ORAL DAILY
Qty: 30 CAPSULE | Refills: 0 | Status: SHIPPED | OUTPATIENT
Start: 2021-03-09 | End: 2021-03-31

## 2021-03-31 ENCOUNTER — OFFICE VISIT (OUTPATIENT)
Dept: FAMILY MEDICINE CLINIC | Facility: CLINIC | Age: 26
End: 2021-03-31

## 2021-03-31 VITALS
TEMPERATURE: 97.5 F | HEIGHT: 64 IN | OXYGEN SATURATION: 98 % | DIASTOLIC BLOOD PRESSURE: 82 MMHG | BODY MASS INDEX: 34.04 KG/M2 | HEART RATE: 117 BPM | SYSTOLIC BLOOD PRESSURE: 128 MMHG | WEIGHT: 199.4 LBS

## 2021-03-31 DIAGNOSIS — B37.9 YEAST INFECTION: ICD-10-CM

## 2021-03-31 DIAGNOSIS — F41.9 ANXIETY: ICD-10-CM

## 2021-03-31 DIAGNOSIS — K21.00 GASTROESOPHAGEAL REFLUX DISEASE WITH ESOPHAGITIS WITHOUT HEMORRHAGE: ICD-10-CM

## 2021-03-31 DIAGNOSIS — F31.81 BIPOLAR 2 DISORDER (HCC): Primary | ICD-10-CM

## 2021-03-31 PROCEDURE — 99214 OFFICE O/P EST MOD 30 MIN: CPT | Performed by: NURSE PRACTITIONER

## 2021-03-31 RX ORDER — FLUCONAZOLE 150 MG/1
150 TABLET ORAL ONCE
Qty: 1 TABLET | Refills: 0 | Status: SHIPPED | OUTPATIENT
Start: 2021-03-31 | End: 2021-03-31

## 2021-03-31 RX ORDER — OMEPRAZOLE 20 MG/1
20 CAPSULE, DELAYED RELEASE ORAL DAILY
Qty: 30 CAPSULE | Refills: 2 | Status: SHIPPED | OUTPATIENT
Start: 2021-03-31 | End: 2021-09-27 | Stop reason: SDUPTHER

## 2021-03-31 RX ORDER — QUETIAPINE FUMARATE 200 MG/1
200 TABLET, FILM COATED ORAL NIGHTLY
Qty: 30 TABLET | Refills: 2 | Status: SHIPPED | OUTPATIENT
Start: 2021-03-31 | End: 2021-09-24

## 2021-03-31 RX ORDER — VENLAFAXINE HYDROCHLORIDE 150 MG/1
150 CAPSULE, EXTENDED RELEASE ORAL DAILY
Qty: 30 CAPSULE | Refills: 2 | Status: SHIPPED | OUTPATIENT
Start: 2021-03-31 | End: 2021-07-19

## 2021-03-31 RX ORDER — PROMETHAZINE HYDROCHLORIDE 25 MG/1
25 TABLET ORAL
COMMUNITY
Start: 2021-02-03

## 2021-03-31 RX ORDER — IBUPROFEN 400 MG/1
400 TABLET ORAL
COMMUNITY
Start: 2021-02-02 | End: 2021-08-31

## 2021-04-16 ENCOUNTER — BULK ORDERING (OUTPATIENT)
Dept: CASE MANAGEMENT | Facility: OTHER | Age: 26
End: 2021-04-16

## 2021-04-16 DIAGNOSIS — Z23 IMMUNIZATION DUE: ICD-10-CM

## 2021-04-26 ENCOUNTER — OFFICE VISIT (OUTPATIENT)
Dept: OBSTETRICS AND GYNECOLOGY | Age: 26
End: 2021-04-26

## 2021-04-26 VITALS
SYSTOLIC BLOOD PRESSURE: 128 MMHG | DIASTOLIC BLOOD PRESSURE: 72 MMHG | HEIGHT: 64 IN | BODY MASS INDEX: 33.8 KG/M2 | WEIGHT: 198 LBS

## 2021-04-26 DIAGNOSIS — N92.6 MISSED MENSES: ICD-10-CM

## 2021-04-26 DIAGNOSIS — Z01.419 WELL WOMAN EXAM WITH ROUTINE GYNECOLOGICAL EXAM: Primary | ICD-10-CM

## 2021-04-26 PROCEDURE — 99395 PREV VISIT EST AGE 18-39: CPT | Performed by: NURSE PRACTITIONER

## 2021-04-26 PROCEDURE — 81025 URINE PREGNANCY TEST: CPT | Performed by: NURSE PRACTITIONER

## 2021-04-26 RX ORDER — ETONOGESTREL AND ETHINYL ESTRADIOL 11.7; 2.7 MG/1; MG/1
1 INSERT, EXTENDED RELEASE VAGINAL
Qty: 1 EACH | Refills: 12 | Status: SHIPPED | OUTPATIENT
Start: 2021-04-26 | End: 2021-08-17

## 2021-04-26 RX ORDER — FLUCONAZOLE 150 MG/1
150 TABLET ORAL ONCE
Qty: 1 TABLET | Refills: 0 | Status: SHIPPED | OUTPATIENT
Start: 2021-04-26 | End: 2021-04-26

## 2021-04-26 NOTE — PROGRESS NOTES
[facility]  Routine Annual Visit    2021    Patient: Alicia Powell          MR#:[id number]      History of Present Illness    25 y.o. female  who presents for annual exam with request to restart nuvaring    Alicia reports her periods have been regular the past year  Seeing Arian Barnard for primary care     She has no complaints today    Patient's last menstrual period was 2021 (exact date).  Obstetric History:  OB History        0    Para   0    Term   0       0    AB   0    Living   0       SAB   0    TAB   0    Ectopic   0    Molar        Multiple   0    Live Births                   Menstrual History:     Patient's last menstrual period was 2021 (exact date).       Sexual History:       ________________________________________  Patient Active Problem List   Diagnosis   • PCOS (polycystic ovarian syndrome)   • Hirsutism   • Obesity   • Nausea   • Pain of upper abdomen   • Non-intractable vomiting with nausea   • Intractable vomiting   • Obesity (BMI 30-39.9)   • GERD (gastroesophageal reflux disease)   • Anxiety   • Asthma   • Tobacco abuse   • Hypokalemia       Past Medical History:   Diagnosis Date   • Abnormal uterine bleeding (AUB) 2016   • Anemia    • Anxiety    • ASCUS of cervix with negative high risk HPV 2018   • Asthma    • Depression    • Dizziness    • GERD (gastroesophageal reflux disease)    • H/O ETOH abuse    • Multiple URI    • PCOS (polycystic ovarian syndrome) 2016   • PCOS (polycystic ovarian syndrome)    • Pneumonia    • Tobacco abuse 2020   • Viral infection        Past Surgical History:   Procedure Laterality Date   • DENTAL PROCEDURE     • ENDOSCOPY N/A 2018    Procedure: ESOPHAGOGASTRODUODENOSCOPY WITH COLD BIOPSIES;  Surgeon: Damon Morrison MD;  Location: [facility] ENDOSCOPY;  Service: Gastroenterology   • VAGINAL SEPTUM RESECTION  2012    Excision   • WISDOM TOOTH EXTRACTION  2016       Social History     Tobacco  Use   Smoking Status Current Some Day Smoker   • Types: Cigars   Smokeless Tobacco Never Used   Tobacco Comment    infreq       Family History   Problem Relation Age of Onset   • Anxiety disorder Mother    • Depression Mother    • CALDERON disease Mother    • Asthma Mother    • Mental illness Mother    • Hypertension Maternal Grandmother    • Hyperlipidemia Maternal Grandmother    • Other Maternal Grandmother         GERD   • Arthritis Maternal Grandmother    • COPD Maternal Grandmother    • Depression Maternal Grandmother    • Heart disease Maternal Grandmother    • Mental illness Maternal Grandmother    • CALDERON disease Father    • Pancreatitis Father    • Alcohol abuse Father    • Asthma Father    • Heart disease Maternal Grandfather    • Heart disease Paternal Grandfather    • Mental retardation Maternal Uncle    • Breast cancer Neg Hx    • Ovarian cancer Neg Hx    • Uterine cancer Neg Hx    • Colon cancer Neg Hx        Prior to Admission medications    Medication Sig Start Date End Date Taking? Authorizing Provider   omeprazole (priLOSEC) 20 MG capsule Take 1 capsule by mouth Daily. 3/31/21  Yes Arian Barnard APRN   QUEtiapine (SEROquel) 200 MG tablet Take 1 tablet by mouth Every Night. 3/31/21  Yes Arian Barnard APRN   venlafaxine XR (EFFEXOR-XR) 150 MG 24 hr capsule Take 1 capsule by mouth Daily. 3/31/21  Yes Arian Barnard APRN   albuterol sulfate  (90 Base) MCG/ACT inhaler Inhale 2 puffs Every 6 (Six) Hours As Needed for Wheezing or Shortness of Air. 10/22/20   Arian Barnard APRN   calcium carbonate (TUMS) 500 MG chewable tablet Chew 1 tablet Daily.    ProviderHaider MD   famotidine (PEPCID) 20 MG tablet Take 1 tablet by mouth 2 (Two) Times a Day Before Meals. 10/22/20   Arian Barnard APRN   ibuprofen (ADVIL,MOTRIN) 400 MG tablet 400 mg. 2/2/21   ProviderHaider MD   ondansetron ODT (ZOFRAN-ODT) 4 MG disintegrating tablet Place 1 tablet on the tongue Every 8 (Eight) Hours As Needed  "for Nausea. 9/22/20   Kali Moura MD   Probiotic Product (PROBIOTIC-10 PO) Take 1 tablet by mouth Daily.    ProviderHaider MD   promethazine (PHENERGAN) 25 MG tablet 25 mg. 2/3/21   ProviderHaider MD     ________________________________________  The following portions of the patient's history were reviewed and updated as appropriate: allergies, current medications, past family history, past medical history, past social history, past surgical history and problem list.    Review of Systems   Constitutional: Negative.    HENT: Negative.    Eyes: Negative for visual disturbance.   Respiratory: Negative for cough, shortness of breath and wheezing.    Cardiovascular: Negative for chest pain, palpitations and leg swelling.   Gastrointestinal: Negative for abdominal distention, abdominal pain, blood in stool, constipation, diarrhea, nausea and vomiting.   Endocrine: Negative for cold intolerance and heat intolerance.   Genitourinary: Negative for difficulty urinating, dyspareunia, dysuria, frequency, genital sores, hematuria, menstrual problem, pelvic pain, urgency, vaginal bleeding, vaginal discharge and vaginal pain.   Musculoskeletal: Negative.    Skin: Negative.    Neurological: Negative for dizziness, weakness, light-headedness, numbness and headaches.   Hematological: Negative.    Psychiatric/Behavioral: Negative.    Breasts: negative for lumps skin changes, dimpling, swelling, nipple changes/discharge bilaterally         Objective   Physical Exam    /72   Ht 162.6 cm (64\")   Wt 89.8 kg (198 lb)   LMP 03/20/2021 (Exact Date)   Breastfeeding No   BMI 33.99 kg/m²    BP Readings from Last 3 Encounters:   04/26/21 128/72   03/31/21 128/82   10/22/20 136/84      Wt Readings from Last 3 Encounters:   04/26/21 89.8 kg (198 lb)   03/31/21 90.4 kg (199 lb 6.4 oz)   10/22/20 99.6 kg (219 lb 9.6 oz)        BMI: Estimated body mass index is 33.99 kg/m² as calculated from the following:    " "Height as of this encounter: 162.6 cm (64\").    Weight as of this encounter: 89.8 kg (198 lb).            General:   alert, appears stated age and cooperative   Heart: regular rate and rhythm, S1, S2 normal, no murmur, click, rub or gallop   Lungs: clear to auscultation bilaterally   Abdomen: soft, non-tender, without masses or organomegaly   Breast: inspection negative, no nipple discharge or bleeding, no masses or nodularity palpable   Vulva: External genitalia including bartholin's glands, Urethra, [place]'s gland and urethra meatus are normal, Perineum, rectum and anus appear normal  and Bladder appears normal without significant prolapse    Vagina: normal mucosa, normal discharge   Cervix: no cervical motion tenderness and no lesions   Uterus: normal size, mobile, non-tender or normal shape and consistency   Adnexa: no mass, fullness, tenderness     Assessment:    Diagnoses and all orders for this visit:    1. Well woman exam with routine gynecological exam (Primary)  -     IGP,CtNgTv,rfx Aptima HPV ASCU    2. Missed menses  -     POC Pregnancy, Urine    Other orders  -     fluconazole (Diflucan) 150 MG tablet; Take 1 tablet by mouth 1 (One) Time for 1 dose.  Dispense: 1 tablet; Refill: 0  -     etonogestrel-ethinyl estradiol (NuvaRing) 0.12-0.015 MG/24HR vaginal ring; Insert 1 each into the vagina Every 28 (Twenty-Eight) Days. Insert vaginally and leave in place for 3 consecutive weeks, then remove for 1 week.  Dispense: 1 each; Refill: 12       Counseled on healthy lifestyle modifications, safe sex, condom use, and self breast exams.    All of the patient's questions were addressed and answered, I have encouraged her to call for today's test results if she has not received them within 10 days.  Patient is advised to call with any change in her condition or with any other questions, otherwise return in 12 months for annual examination.      Kalli Fregoso, APRN  4/26/2021 13:37 EDT  "

## 2021-04-27 ENCOUNTER — TELEPHONE (OUTPATIENT)
Dept: FAMILY MEDICINE CLINIC | Facility: CLINIC | Age: 26
End: 2021-04-27

## 2021-04-27 NOTE — TELEPHONE ENCOUNTER
Call from after hours:  Call Type: Non Urgent           Reg Dr: MARIBELL Garcia  Name/Facility: [facility]          Pt Name: SAME            Phone:  [phone];              : 1995          Pt Preg: NO              Msg: PLEASE CALL BACK.  NEED  TO KNOW MORE ABOUT OVARIES WHILE ON  BIRTH CONTROL    --------------------------------------  Message History  Account: [id number]  Taken:  2021  5:28p ZV

## 2021-04-29 LAB
C TRACH RRNA CVX QL NAA+PROBE: NEGATIVE
CONV .: NORMAL
CYTOLOGIST CVX/VAG CYTO: NORMAL
CYTOLOGY CVX/VAG DOC CYTO: NORMAL
CYTOLOGY CVX/VAG DOC THIN PREP: NORMAL
DX ICD CODE: NORMAL
HIV 1 & 2 AB SER-IMP: NORMAL
Lab: NORMAL
N GONORRHOEA RRNA CVX QL NAA+PROBE: NEGATIVE
OTHER STN SPEC: NORMAL
STAT OF ADQ CVX/VAG CYTO-IMP: NORMAL
T VAGINALIS RRNA SPEC QL NAA+PROBE: NEGATIVE

## 2021-05-06 RX ORDER — METRONIDAZOLE 500 MG/1
500 TABLET ORAL 2 TIMES DAILY
Qty: 14 TABLET | Refills: 0 | Status: SHIPPED | OUTPATIENT
Start: 2021-05-06 | End: 2021-05-13

## 2021-07-19 RX ORDER — VENLAFAXINE HYDROCHLORIDE 150 MG/1
CAPSULE, EXTENDED RELEASE ORAL
Qty: 30 CAPSULE | Refills: 0 | Status: SHIPPED | OUTPATIENT
Start: 2021-07-19 | End: 2021-09-24

## 2021-08-17 ENCOUNTER — TELEPHONE (OUTPATIENT)
Dept: OBSTETRICS AND GYNECOLOGY | Age: 26
End: 2021-08-17

## 2021-08-17 ENCOUNTER — OFFICE VISIT (OUTPATIENT)
Dept: OBSTETRICS AND GYNECOLOGY | Age: 26
End: 2021-08-17

## 2021-08-17 VITALS
SYSTOLIC BLOOD PRESSURE: 128 MMHG | HEIGHT: 64 IN | BODY MASS INDEX: 36.54 KG/M2 | WEIGHT: 214 LBS | DIASTOLIC BLOOD PRESSURE: 70 MMHG

## 2021-08-17 DIAGNOSIS — Z34.90 EARLY STAGE OF PREGNANCY: Primary | ICD-10-CM

## 2021-08-17 DIAGNOSIS — O36.80X0 PREGNANCY WITH UNCERTAIN FETAL VIABILITY, SINGLE OR UNSPECIFIED FETUS: Primary | ICD-10-CM

## 2021-08-17 PROCEDURE — 99213 OFFICE O/P EST LOW 20 MIN: CPT | Performed by: PHYSICIAN ASSISTANT

## 2021-08-17 NOTE — TELEPHONE ENCOUNTER
----- Message from ARIANA Ang sent at 8/17/2021  4:27 PM EDT -----  Let her know that I s/w Dr Delaney and she would like her to c/I to check a quant hcg level. I don't believe they can run it off the labs she had done earlier (abo/rh), so if she could c/I tomorrow for this labs test, that would be great

## 2021-08-17 NOTE — PROGRESS NOTES
"Subjective     Chief Complaint   Patient presents with   • Gynecologic Exam     pregnancy confirmation       Alicia Powell is a 25 y.o.  whose LMP is Patient's last menstrual period was 2021 (approximate). presents for early ob scan  She is here today with her Mother  Has no issues  LMP 6/15 or 21  +UPT was on the   Should be approx 8 wks  Has had nausea and fatigue    FOB is \"somewhat involved\"    This is first pregnancy for pt and was not planned    No Additional Complaints Reported    The following portions of the patient's history were reviewed and updated as appropriate:vital signs, allergies, current medications, past family history, past medical history, past social history, past surgical history and problem list      Review of Systems   Genitourinary:positive for early ob scan     Objective      /70   Ht 162.6 cm (64\")   Wt 97.1 kg (214 lb)   LMP 2021 (Approximate)   Breastfeeding No   BMI 36.73 kg/m²     Physical Exam    General:   alert, comfortable and no distress   Heart: Not performed today   Lungs: Not performed today.   Breast: Not performed today   Neck: na   Abdomen: {Not performed today   CVA: Not performed today   Pelvis: Not performed today   Extremities: Not performed today   Neurologic: negative   Psychiatric: Normal affect, judgement, and mood       Lab Review   Labs: No data reviewed     Imaging   Ultrasound - Pelvic Vaginal    Assessment/Plan     ASSESSMENT  1. Early stage of pregnancy        PLAN  1.   Orders Placed This Encounter   Procedures   • ABO / Rh       2. I discussed with pt and mom that her u/s and LMP are not lining up. Pt expected to be approx 8 wks along, but GS is measuring 6wks. Also only see YS and no fetal pole. This is likely a non viable pregnancy but too soon to call it. Pt is not having any pain or bleeding. Notes mild cramping only. We will plan rpt scan in 1 wk. I have counseled pt in regards to what to expect if " miscarriage were to occur and when to call us or go to ER.  I did also briefly discuss with pt that her seroquel and effexor should be discontinued or changed should this be a viable pregnancy or if pt should seek another pregnancy  All questions were answered    Pt was sent for labs to assess blood type as well    Follow up: 1 week(s)    ARIANA Devries  8/17/2021

## 2021-08-18 LAB
ABO GROUP BLD: NORMAL
RH BLD: POSITIVE

## 2021-08-20 ENCOUNTER — TELEPHONE (OUTPATIENT)
Dept: OBSTETRICS AND GYNECOLOGY | Age: 26
End: 2021-08-20

## 2021-08-20 DIAGNOSIS — Z34.91 PREGNANCY WITH UNCERTAIN DATES IN FIRST TRIMESTER: Primary | ICD-10-CM

## 2021-08-20 LAB — HCG INTACT+B SERPL-ACNC: NORMAL MIU/ML

## 2021-08-20 NOTE — TELEPHONE ENCOUNTER
Reviewed labs isabel    pts hcg looks about 6-7 weeks.  Isabel wants her to come back on Monday and repeat her hcg levels to see what they are looking like.  I know she has an appt on Tuesday for ultrasound.  But she would like that lab back when she sees her.

## 2021-08-24 ENCOUNTER — OFFICE VISIT (OUTPATIENT)
Dept: OBSTETRICS AND GYNECOLOGY | Age: 26
End: 2021-08-24

## 2021-08-24 VITALS
DIASTOLIC BLOOD PRESSURE: 84 MMHG | WEIGHT: 209 LBS | BODY MASS INDEX: 35.68 KG/M2 | SYSTOLIC BLOOD PRESSURE: 136 MMHG | HEIGHT: 64 IN

## 2021-08-24 DIAGNOSIS — O36.80X0 PREGNANCY WITH INCONCLUSIVE FETAL VIABILITY, SINGLE OR UNSPECIFIED FETUS: Primary | ICD-10-CM

## 2021-08-24 PROCEDURE — 99213 OFFICE O/P EST LOW 20 MIN: CPT | Performed by: PHYSICIAN ASSISTANT

## 2021-08-24 NOTE — PROGRESS NOTES
"Subjective     Chief Complaint   Patient presents with   • Threatened Miscarriage     follow up from 2021       Alicia Powell is a 25 y.o.  whose LMP is Patient's last menstrual period was 2021 (approximate). presents for rpt u/s  She was seen last week for confirmation of pregnancy  At that time, a large YS was noted with no fetal pole and dates were not consistent  Pt was experiencing no pain or bleeding  We discussed possibility of miscarriage and she was given sx's to be on the lookout for        No Additional Complaints Reported    The following portions of the patient's history were reviewed and updated as appropriate:vital signs, allergies, current medications, past family history, past medical history, past social history, past surgical history and problem list      Review of Systems   Genitourinary:positive for pregnancy with uncertain viability     Objective      /84   Ht 162.6 cm (64\")   Wt 94.8 kg (209 lb)   LMP 2021 (Approximate)   BMI 35.87 kg/m²     Physical Exam    General:   alert, comfortable and no distress   Heart: Not performed today   Lungs: Not performed today.   Breast: na   Neck: na   Abdomen: {Not performed today   CVA: Not performed today   Pelvis: Not performed today   Extremities: Not performed today   Neurologic: negative   Psychiatric: Normal affect, judgement, and mood       Lab Review   Labs: HCG - quantitative     Imaging   Ultrasound - Pelvic Vaginal    GS and YS seen   No fetal pole seen at this time  Rt ovary wnl, follicle noted  Left ovary not seen  Cystic structure seen posteriorly, clost to MIKAELA, 12 x 12 x14 mm    Assessment/Plan     ASSESSMENT  1. Pregnancy with inconclusive fetal viability, single or unspecified fetus        PLAN  1. Pt did not get labs done yesterday. She was apparently having phone issues. Planning to get it fixed today. Will rpt labs today and plan rpt u/s in 1 wk. Too soon to determine viability. Pt and mom in room. All " questions answered. Advised to call with pain or bleeding    Follow up: 1 week(s)    ARIANA Devries  8/24/2021

## 2021-08-25 LAB — HCG INTACT+B SERPL-ACNC: NORMAL MIU/ML

## 2021-08-31 ENCOUNTER — OFFICE VISIT (OUTPATIENT)
Dept: OBSTETRICS AND GYNECOLOGY | Age: 26
End: 2021-08-31

## 2021-08-31 DIAGNOSIS — O36.80X0 PREGNANCY WITH UNCERTAIN FETAL VIABILITY, SINGLE OR UNSPECIFIED FETUS: Primary | ICD-10-CM

## 2021-08-31 PROCEDURE — 99213 OFFICE O/P EST LOW 20 MIN: CPT | Performed by: PHYSICIAN ASSISTANT

## 2021-08-31 RX ORDER — ETONOGESTREL AND ETHINYL ESTRADIOL 11.7; 2.7 MG/1; MG/1
INSERT, EXTENDED RELEASE VAGINAL
Qty: 1 EACH | Refills: 3 | Status: SHIPPED | OUTPATIENT
Start: 2021-08-31

## 2021-08-31 RX ORDER — IBUPROFEN 800 MG/1
800 TABLET ORAL EVERY 6 HOURS PRN
Qty: 60 TABLET | Refills: 0 | Status: SHIPPED | OUTPATIENT
Start: 2021-08-31

## 2021-08-31 RX ORDER — MISOPROSTOL 200 UG/1
TABLET ORAL
Qty: 6 TABLET | Refills: 0 | Status: SHIPPED | OUTPATIENT
Start: 2021-08-31 | End: 2022-03-09

## 2021-08-31 NOTE — PROGRESS NOTES
Subjective     Chief Complaint   Patient presents with   • Gynecologic Exam     follow up viability from 2021       Alicia Powell is a 25 y.o.  whose LMP is Patient's last menstrual period was 2021 (approximate). presents for rpt u/s    Pt has had 2 other u/s done for viability purposes  There is question as to whether this is a viable pregnancy  The 2 u/s were done by 2 different u/s techs and gave different reports  She is here for a third u/s today to hopefully determine pregnancy state    She is accompanied by her mother    We have had quant hcg levels done that have been reviewed  Blood type is A+  She should be approximately 10 wks along based off LMP of 2021    No Additional Complaints Reported    The following portions of the patient's history were reviewed and updated as appropriate:vital signs, allergies, current medications, past family history, past medical history, past social history, past surgical history and problem list      Review of Systems   Genitourinary:positive for early stage of pregnancy     Objective      LMP 2021 (Approximate)   Breastfeeding No     Physical Exam    General:   alert, comfortable and no distress   Heart: Not performed today   Lungs: Not performed today.   Breast: Not performed today   Neck: na   Abdomen: {Not performed today   CVA: Not performed today   Pelvis: Not performed today   Extremities: Not performed today   Neurologic: negative   Psychiatric: Normal affect, judgement, and mood       Lab Review   Labs: HCG - quantitative     Imaging   Ultrasound - Pelvic Vaginal    GS measuring c/w 7 wks 4 days, no fetal pole noted  GS measures 26 mm with no embryo which is clinically diagnostic for pregnancy failure    Assessment/Plan     ASSESSMENT  1. Pregnancy with uncertain fetal viability, single or unspecified fetus        PLAN  1. Options were discussed with pt and mother, observation, medical management or surgery. She opted for  medical management. I counseled pt on expected pain and bleeding and sent in ibuprofen to help with her sx's. I would suggest rest and hydration as well. She is to call with any concerns. I have her scheduled for a f/u u/s for confirmation of complete miscarriage as well.     2. Medications prescribed this encounter:        New Medications Ordered This Visit   Medications   • etonogestrel-ethinyl estradiol (NUVARING) 0.12-0.015 MG/24HR vaginal ring     Si pv for 3  weeks, remove for 1 wk.     Dispense:  1 each     Refill:  3   • miSOPROStol (Cytotec) 200 MCG tablet     Sig: Take 3 pills today, then rpt 24 hours later     Dispense:  6 tablet     Refill:  0   • ibuprofen (ADVIL,MOTRIN) 800 MG tablet     Sig: Take 1 tablet by mouth Every 6 (Six) Hours As Needed for Mild Pain .     Dispense:  60 tablet     Refill:  0     3. We did discuss BC as well since this was an unplanned surprise. They would like nuva ring but it has not been covered in the past. I will send it in and recomended looking at good rx and cash pay options.  I also gave HO on kyleena iud and recommended she sign PW for it today. Would place with next cycle. Recommended pelvic rest in meantime      Follow up: 2 week(s)    ARIANA Devries  2021

## 2021-09-24 RX ORDER — VENLAFAXINE HYDROCHLORIDE 150 MG/1
CAPSULE, EXTENDED RELEASE ORAL
Qty: 30 CAPSULE | Refills: 0 | Status: SHIPPED | OUTPATIENT
Start: 2021-09-24 | End: 2021-09-27 | Stop reason: SDUPTHER

## 2021-09-24 RX ORDER — QUETIAPINE FUMARATE 200 MG/1
TABLET, FILM COATED ORAL
Qty: 30 TABLET | Refills: 0 | Status: SHIPPED | OUTPATIENT
Start: 2021-09-24 | End: 2021-09-27 | Stop reason: SDUPTHER

## 2021-09-27 ENCOUNTER — OFFICE VISIT (OUTPATIENT)
Dept: FAMILY MEDICINE CLINIC | Facility: CLINIC | Age: 26
End: 2021-09-27

## 2021-09-27 VITALS
TEMPERATURE: 97.8 F | HEART RATE: 102 BPM | DIASTOLIC BLOOD PRESSURE: 72 MMHG | SYSTOLIC BLOOD PRESSURE: 128 MMHG | HEIGHT: 64 IN | BODY MASS INDEX: 36.67 KG/M2 | WEIGHT: 214.8 LBS | OXYGEN SATURATION: 100 %

## 2021-09-27 DIAGNOSIS — K21.00 GASTROESOPHAGEAL REFLUX DISEASE WITH ESOPHAGITIS WITHOUT HEMORRHAGE: ICD-10-CM

## 2021-09-27 DIAGNOSIS — J45.20 MILD INTERMITTENT REACTIVE AIRWAY DISEASE WITHOUT COMPLICATION: ICD-10-CM

## 2021-09-27 DIAGNOSIS — D51.9 ANEMIA DUE TO VITAMIN B12 DEFICIENCY, UNSPECIFIED B12 DEFICIENCY TYPE: ICD-10-CM

## 2021-09-27 DIAGNOSIS — F41.9 ANXIETY: ICD-10-CM

## 2021-09-27 DIAGNOSIS — F31.81 BIPOLAR 2 DISORDER (HCC): Primary | ICD-10-CM

## 2021-09-27 PROCEDURE — 99214 OFFICE O/P EST MOD 30 MIN: CPT | Performed by: NURSE PRACTITIONER

## 2021-09-27 RX ORDER — QUETIAPINE FUMARATE 200 MG/1
200 TABLET, FILM COATED ORAL NIGHTLY
Qty: 90 TABLET | Refills: 1 | Status: SHIPPED | OUTPATIENT
Start: 2021-09-27 | End: 2022-03-09 | Stop reason: SDUPTHER

## 2021-09-27 RX ORDER — VENLAFAXINE HYDROCHLORIDE 150 MG/1
150 CAPSULE, EXTENDED RELEASE ORAL DAILY
Qty: 90 CAPSULE | Refills: 1 | Status: SHIPPED | OUTPATIENT
Start: 2021-09-27 | End: 2022-03-09

## 2021-09-27 RX ORDER — ALBUTEROL SULFATE 90 UG/1
2 AEROSOL, METERED RESPIRATORY (INHALATION) EVERY 6 HOURS PRN
Qty: 6.7 G | Refills: 2 | Status: SHIPPED | OUTPATIENT
Start: 2021-09-27

## 2021-09-27 RX ORDER — OMEPRAZOLE 20 MG/1
20 CAPSULE, DELAYED RELEASE ORAL DAILY
Qty: 90 CAPSULE | Refills: 1 | Status: SHIPPED | OUTPATIENT
Start: 2021-09-27 | End: 2022-03-09 | Stop reason: SDUPTHER

## 2021-09-27 NOTE — PROGRESS NOTES
"Chief Complaint  Med Refill, Depression, Anxiety, Manic Behavior, and Heartburn    Subjective          Alicia Powell presents to [facility]  History of Present Illness     Patient is here for follow-up on chronic conditions.  She has no new complaints today    Bipolar/anxiety- seroquel and effexor work well for her. Denies any issues with medication. Notices a huge difference when she doesn't have.    She denies any SI or HI    Heartburn- omeprazole works well for heartburn. Has been out of medication.   Needs refills    Asthma-needs refills on albuterol.  Uses not even weekly    Objective   Vital Signs:   /72   Pulse 102   Temp 97.8 °F (36.6 °C)   Ht 162.6 cm (64\")   Wt 97.4 kg (214 lb 12.8 oz)   SpO2 100%   BMI 36.87 kg/m²     Physical Exam  Constitutional:       Appearance: Normal appearance.   Cardiovascular:      Rate and Rhythm: Normal rate and regular rhythm.      Pulses: Normal pulses.   Pulmonary:      Effort: Pulmonary effort is normal.      Breath sounds: Normal breath sounds.   Skin:     General: Skin is warm and dry.   Neurological:      Mental Status: She is alert.   Psychiatric:         Mood and Affect: Mood normal.         Behavior: Behavior normal.         Thought Content: Thought content normal.         Judgment: Judgment normal.        Result Review :                 Assessment and Plan    Diagnoses and all orders for this visit:    1. Bipolar 2 disorder (HCC) (Primary)  -     CBC & Differential  -     Comprehensive Metabolic Panel  -     TSH  -     Vitamin B12  -     Ferritin  -     Iron and TIBC    2. Mild intermittent reactive airway disease without complication  -     albuterol sulfate  (90 Base) MCG/ACT inhaler; Inhale 2 puffs Every 6 (Six) Hours As Needed for Wheezing or Shortness of Air.  Dispense: 6.7 g; Refill: 2  -     CBC & Differential  -     Comprehensive Metabolic Panel  -     TSH  -     Vitamin B12  -     Ferritin  -     Iron and " TIBC    3. Anxiety  -     CBC & Differential  -     Comprehensive Metabolic Panel  -     TSH  -     Vitamin B12  -     Ferritin  -     Iron and TIBC    4. Gastroesophageal reflux disease with esophagitis without hemorrhage  -     CBC & Differential  -     Comprehensive Metabolic Panel  -     TSH  -     Vitamin B12  -     Ferritin  -     Iron and TIBC    5. Anemia due to vitamin B12 deficiency, unspecified B12 deficiency type  -     CBC & Differential  -     Comprehensive Metabolic Panel  -     TSH  -     Vitamin B12  -     Ferritin  -     Iron and TIBC    Other orders  -     omeprazole (priLOSEC) 20 MG capsule; Take 1 capsule by mouth Daily.  Dispense: 90 capsule; Refill: 1  -     QUEtiapine (SEROquel) 200 MG tablet; Take 1 tablet by mouth Every Night.  Dispense: 90 tablet; Refill: 1  -     venlafaxine XR (EFFEXOR-XR) 150 MG 24 hr capsule; Take 1 capsule by mouth Daily.  Dispense: 90 capsule; Refill: 1      Bipolar/anxiety- seroquel and effexor work well for her.  Restart medication.  If any issues notify provider.    Heartburn- omeprazole works well for heartburn. Has been out of medication.   Needs refills.  Refills given    Asthma-needs refills on albuterol.  Uses not even weekly.  Refills given    Will call with results of labs any changes needed plan of care.  Patient was anemic on last check and I would like to recheck this.  Like her to follow-up for complete physical at her convenience.      Follow Up   No follow-ups on file.  Patient was given instructions and counseling regarding her condition or for health maintenance advice. Please see specific information pulled into the AVS if appropriate.

## 2021-09-28 LAB
ALBUMIN SERPL-MCNC: 4.1 G/DL (ref 3.9–5)
ALBUMIN/GLOB SERPL: 1.6 {RATIO} (ref 1.2–2.2)
ALP SERPL-CCNC: 73 IU/L (ref 44–121)
ALT SERPL-CCNC: 9 IU/L (ref 0–32)
AST SERPL-CCNC: 14 IU/L (ref 0–40)
BASOPHILS # BLD AUTO: 0 X10E3/UL (ref 0–0.2)
BASOPHILS NFR BLD AUTO: 0 %
BILIRUB SERPL-MCNC: 0.2 MG/DL (ref 0–1.2)
BUN SERPL-MCNC: 11 MG/DL (ref 6–20)
BUN/CREAT SERPL: 16 (ref 9–23)
CALCIUM SERPL-MCNC: 9.4 MG/DL (ref 8.7–10.2)
CHLORIDE SERPL-SCNC: 104 MMOL/L (ref 96–106)
CO2 SERPL-SCNC: 23 MMOL/L (ref 20–29)
CREAT SERPL-MCNC: 0.67 MG/DL (ref 0.57–1)
EOSINOPHIL # BLD AUTO: 0.2 X10E3/UL (ref 0–0.4)
EOSINOPHIL NFR BLD AUTO: 3 %
ERYTHROCYTE [DISTWIDTH] IN BLOOD BY AUTOMATED COUNT: 17 % (ref 11.7–15.4)
FERRITIN SERPL-MCNC: 4 NG/ML (ref 15–150)
GLOBULIN SER CALC-MCNC: 2.6 G/DL (ref 1.5–4.5)
GLUCOSE SERPL-MCNC: 86 MG/DL (ref 65–99)
HCT VFR BLD AUTO: 28.7 % (ref 34–46.6)
HGB BLD-MCNC: 7.6 G/DL (ref 11.1–15.9)
IMM GRANULOCYTES # BLD AUTO: 0 X10E3/UL (ref 0–0.1)
IMM GRANULOCYTES NFR BLD AUTO: 0 %
IRON SATN MFR SERPL: 3 % (ref 15–55)
IRON SERPL-MCNC: 11 UG/DL (ref 27–159)
LYMPHOCYTES # BLD AUTO: 2.6 X10E3/UL (ref 0.7–3.1)
LYMPHOCYTES NFR BLD AUTO: 30 %
MCH RBC QN AUTO: 18.1 PG (ref 26.6–33)
MCHC RBC AUTO-ENTMCNC: 26.5 G/DL (ref 31.5–35.7)
MCV RBC AUTO: 69 FL (ref 79–97)
MONOCYTES # BLD AUTO: 0.7 X10E3/UL (ref 0.1–0.9)
MONOCYTES NFR BLD AUTO: 8 %
NEUTROPHILS # BLD AUTO: 5.1 X10E3/UL (ref 1.4–7)
NEUTROPHILS NFR BLD AUTO: 59 %
PLATELET # BLD AUTO: 421 X10E3/UL (ref 150–450)
POTASSIUM SERPL-SCNC: 4.6 MMOL/L (ref 3.5–5.2)
PROT SERPL-MCNC: 6.7 G/DL (ref 6–8.5)
RBC # BLD AUTO: 4.19 X10E6/UL (ref 3.77–5.28)
SODIUM SERPL-SCNC: 139 MMOL/L (ref 134–144)
TIBC SERPL-MCNC: 438 UG/DL (ref 250–450)
TSH SERPL DL<=0.005 MIU/L-ACNC: 2.11 UIU/ML (ref 0.45–4.5)
UIBC SERPL-MCNC: 427 UG/DL (ref 131–425)
VIT B12 SERPL-MCNC: 342 PG/ML (ref 232–1245)
WBC # BLD AUTO: 8.7 X10E3/UL (ref 3.4–10.8)

## 2021-09-28 RX ORDER — FERROUS SULFATE TAB EC 324 MG (65 MG FE EQUIVALENT) 324 (65 FE) MG
324 TABLET DELAYED RESPONSE ORAL 2 TIMES DAILY WITH MEALS
Qty: 60 TABLET | Refills: 2 | Status: SHIPPED | OUTPATIENT
Start: 2021-09-28

## 2021-10-12 ENCOUNTER — CLINICAL SUPPORT (OUTPATIENT)
Dept: FAMILY MEDICINE CLINIC | Facility: CLINIC | Age: 26
End: 2021-10-12

## 2021-10-12 VITALS — TEMPERATURE: 98.6 F

## 2021-10-12 DIAGNOSIS — E53.8 B12 DEFICIENCY: ICD-10-CM

## 2021-10-12 PROCEDURE — 96372 THER/PROPH/DIAG INJ SC/IM: CPT | Performed by: FAMILY MEDICINE

## 2021-10-12 RX ADMIN — CYANOCOBALAMIN 1000 MCG: 1000 INJECTION, SOLUTION INTRAMUSCULAR; SUBCUTANEOUS at 14:02

## 2022-03-09 ENCOUNTER — OFFICE VISIT (OUTPATIENT)
Dept: FAMILY MEDICINE CLINIC | Facility: CLINIC | Age: 27
End: 2022-03-09

## 2022-03-09 VITALS
SYSTOLIC BLOOD PRESSURE: 142 MMHG | HEIGHT: 64 IN | WEIGHT: 233 LBS | OXYGEN SATURATION: 100 % | TEMPERATURE: 97.8 F | HEART RATE: 98 BPM | DIASTOLIC BLOOD PRESSURE: 82 MMHG | BODY MASS INDEX: 39.78 KG/M2

## 2022-03-09 DIAGNOSIS — F41.9 ANXIETY: ICD-10-CM

## 2022-03-09 DIAGNOSIS — K21.00 GASTROESOPHAGEAL REFLUX DISEASE WITH ESOPHAGITIS WITHOUT HEMORRHAGE: ICD-10-CM

## 2022-03-09 DIAGNOSIS — J45.20 MILD INTERMITTENT REACTIVE AIRWAY DISEASE WITHOUT COMPLICATION: ICD-10-CM

## 2022-03-09 DIAGNOSIS — F31.81 BIPOLAR 2 DISORDER: ICD-10-CM

## 2022-03-09 DIAGNOSIS — D51.9 ANEMIA DUE TO VITAMIN B12 DEFICIENCY, UNSPECIFIED B12 DEFICIENCY TYPE: ICD-10-CM

## 2022-03-09 DIAGNOSIS — E53.8 B12 DEFICIENCY: Primary | ICD-10-CM

## 2022-03-09 PROBLEM — J30.9 ALLERGIC RHINITIS: Status: ACTIVE | Noted: 2022-03-09

## 2022-03-09 PROBLEM — R53.83 FATIGUE: Status: ACTIVE | Noted: 2022-03-09
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
## 2022-03-09 PROBLEM — F41.0 PANIC ATTACK: Status: ACTIVE | Noted: 2022-03-09

## 2022-03-09 PROBLEM — M54.6 BACK PAIN, THORACIC: Status: ACTIVE | Noted: 2022-03-09

## 2022-03-09 PROBLEM — N39.0 INFECTION OF URINARY TRACT: Status: ACTIVE | Noted: 2022-03-09

## 2022-03-09 PROBLEM — S16.1XXA CERVICAL MUSCLE STRAIN: Status: ACTIVE | Noted: 2022-03-09

## 2022-03-09 PROBLEM — F07.81 BRAIN SYNDROME, POSTTRAUMATIC: Status: ACTIVE | Noted: 2022-03-09

## 2022-03-09 PROBLEM — R63.5 ABNORMAL WEIGHT GAIN: Status: ACTIVE | Noted: 2022-03-09

## 2022-03-09 PROBLEM — R71.8 DECREASED MEAN CORPUSCULAR VOLUME: Status: ACTIVE | Noted: 2022-03-09

## 2022-03-09 PROBLEM — IMO0001 BLUES: Status: ACTIVE | Noted: 2022-03-09

## 2022-03-09 PROBLEM — F10.20 ALCOHOLISM (HCC): Status: ACTIVE | Noted: 2022-03-09

## 2022-03-09 PROBLEM — R01.1 HEART MURMUR: Status: ACTIVE | Noted: 2022-03-09

## 2022-03-09 PROBLEM — J18.9 CAP (COMMUNITY ACQUIRED PNEUMONIA): Status: ACTIVE | Noted: 2022-03-09

## 2022-03-09 PROBLEM — N92.6 ABNORMAL MENSES: Status: ACTIVE | Noted: 2022-03-09

## 2022-03-09 PROBLEM — M25.519 PAIN IN SHOULDER: Status: ACTIVE | Noted: 2022-03-09

## 2022-03-09 PROBLEM — G43.909 HEADACHE, MIGRAINE: Status: ACTIVE | Noted: 2022-03-09

## 2022-03-09 PROBLEM — R55 NEAR SYNCOPE: Status: ACTIVE | Noted: 2022-03-09

## 2022-03-09 PROBLEM — R79.89 ELEVATED PLATELET COUNT: Status: ACTIVE | Noted: 2022-03-09

## 2022-03-09 PROBLEM — S39.012A LOW BACK STRAIN: Status: ACTIVE | Noted: 2022-03-09

## 2022-03-09 PROCEDURE — 99214 OFFICE O/P EST MOD 30 MIN: CPT | Performed by: NURSE PRACTITIONER

## 2022-03-09 PROCEDURE — 96372 THER/PROPH/DIAG INJ SC/IM: CPT | Performed by: NURSE PRACTITIONER

## 2022-03-09 RX ORDER — QUETIAPINE FUMARATE 200 MG/1
TABLET, FILM COATED ORAL
Qty: 90 TABLET | Refills: 0 | OUTPATIENT
Start: 2022-03-09

## 2022-03-09 RX ORDER — VENLAFAXINE HYDROCHLORIDE 150 MG/1
CAPSULE, EXTENDED RELEASE ORAL
Qty: 90 CAPSULE | Refills: 0 | OUTPATIENT
Start: 2022-03-09

## 2022-03-09 RX ORDER — VENLAFAXINE HYDROCHLORIDE 75 MG/1
225 CAPSULE, EXTENDED RELEASE ORAL DAILY
Qty: 270 CAPSULE | Refills: 1 | Status: SHIPPED | OUTPATIENT
Start: 2022-03-09 | End: 2022-11-04 | Stop reason: SDUPTHER

## 2022-03-09 RX ORDER — CYANOCOBALAMIN 1000 UG/ML
1000 INJECTION, SOLUTION INTRAMUSCULAR; SUBCUTANEOUS ONCE
Status: COMPLETED | OUTPATIENT
Start: 2022-03-09 | End: 2022-03-09

## 2022-03-09 RX ORDER — QUETIAPINE FUMARATE 200 MG/1
200 TABLET, FILM COATED ORAL NIGHTLY
Qty: 90 TABLET | Refills: 1 | Status: SHIPPED | OUTPATIENT
Start: 2022-03-09 | End: 2022-10-18 | Stop reason: SDUPTHER

## 2022-03-09 RX ORDER — OMEPRAZOLE 20 MG/1
20 CAPSULE, DELAYED RELEASE ORAL DAILY
Qty: 90 CAPSULE | Refills: 1 | Status: SHIPPED | OUTPATIENT
Start: 2022-03-09

## 2022-03-09 RX ADMIN — CYANOCOBALAMIN 1000 MCG: 1000 INJECTION, SOLUTION INTRAMUSCULAR; SUBCUTANEOUS at 14:43

## 2022-03-09 NOTE — PROGRESS NOTES
"Chief Complaint  Anxiety, Depression, and Med Refill    Subjective          Alicia Powell presents to [facility]  History of Present Illness    Patient is here for follow-up on chronic conditions.  She has no new complaints today     Bipolar/anxiety- seroquel and effexor work well for her. Denies any issues with medication   She denies any SI or HI  She feels like the effexor may need to be increased.  Had a mild depressive episode last week.        Heartburn- omeprazole works well for heartburn.   Needs refills     Asthma-needs refills on albuterol.  Uses not even weekly    She is asking about restarting vitamin B12 injections.  She has been really fatigued lately.       Kidney's were hurting after 4 hours of ibuprofen  Denies any pain with urination.       Objective   Vital Signs:   /82   Pulse 98   Temp 97.8 °F (36.6 °C)   Ht 162.6 cm (64\")   Wt 106 kg (233 lb)   SpO2 100%   BMI 39.99 kg/m²     Physical Exam  Constitutional:       Appearance: Normal appearance.   Cardiovascular:      Rate and Rhythm: Normal rate and regular rhythm.      Pulses: Normal pulses.   Pulmonary:      Effort: Pulmonary effort is normal.      Breath sounds: Normal breath sounds.   Skin:     General: Skin is warm and dry.   Neurological:      Mental Status: She is alert.   Psychiatric:         Mood and Affect: Mood is anxious.         Behavior: Behavior normal.         Thought Content: Thought content normal.         Judgment: Judgment normal.        Result Review :                 Assessment and Plan    Diagnoses and all orders for this visit:    1. B12 deficiency (Primary)  -     CBC & Differential  -     Comprehensive Metabolic Panel  -     TSH  -     Vitamin B12  -     Ferritin  -     Iron and TIBC  -     cyanocobalamin injection 1,000 mcg    2. Mild intermittent reactive airway disease without complication  -     CBC & Differential  -     Comprehensive Metabolic Panel  -     TSH  -     " Vitamin B12  -     Ferritin  -     Iron and TIBC    3. Bipolar 2 disorder (HCC)  -     CBC & Differential  -     Comprehensive Metabolic Panel  -     TSH  -     Vitamin B12  -     Ferritin  -     Iron and TIBC    4. Anxiety  -     CBC & Differential  -     Comprehensive Metabolic Panel  -     TSH  -     Vitamin B12  -     Ferritin  -     Iron and TIBC    5. Gastroesophageal reflux disease with esophagitis without hemorrhage  -     CBC & Differential  -     Comprehensive Metabolic Panel  -     TSH  -     Vitamin B12  -     Ferritin  -     Iron and TIBC    6. Anemia due to vitamin B12 deficiency, unspecified B12 deficiency type  -     CBC & Differential  -     Comprehensive Metabolic Panel  -     TSH  -     Vitamin B12  -     Ferritin  -     Iron and TIBC    Other orders  -     venlafaxine XR (EFFEXOR-XR) 75 MG 24 hr capsule; Take 3 capsules by mouth Daily.  Dispense: 270 capsule; Refill: 1  -     QUEtiapine (SEROquel) 200 MG tablet; Take 1 tablet by mouth Every Night.  Dispense: 90 tablet; Refill: 1  -     omeprazole (priLOSEC) 20 MG capsule; Take 1 capsule by mouth Daily.  Dispense: 90 capsule; Refill: 1      We discussed options for treatment for depression anxiety.  We will increase Effexor dose.  Continue Seroquel.  We will follow-up in 3 months.  If no resolution of depression symptoms follow-up with me sooner.    GERD-well-controlled.  Continue Prilosec.    We will check labs today and call with results any changes needed plan of care.  She verbalized understanding.  Went on and gave vitamin B12 injection today      Follow Up   Return in about 3 months (around 6/9/2022) for Annual physical.  Patient was given instructions and counseling regarding her condition or for health maintenance advice. Please see specific information pulled into the AVS if appropriate.

## 2022-03-10 LAB
ALBUMIN SERPL-MCNC: 4.3 G/DL (ref 3.9–5)
ALBUMIN/GLOB SERPL: 1.7 {RATIO} (ref 1.2–2.2)
ALP SERPL-CCNC: 69 IU/L (ref 44–121)
ALT SERPL-CCNC: 13 IU/L (ref 0–32)
AST SERPL-CCNC: 18 IU/L (ref 0–40)
BASOPHILS # BLD AUTO: 0 X10E3/UL (ref 0–0.2)
BASOPHILS NFR BLD AUTO: 0 %
BILIRUB SERPL-MCNC: <0.2 MG/DL (ref 0–1.2)
BUN SERPL-MCNC: 12 MG/DL (ref 6–20)
BUN/CREAT SERPL: 16 (ref 9–23)
CALCIUM SERPL-MCNC: 9.2 MG/DL (ref 8.7–10.2)
CHLORIDE SERPL-SCNC: 104 MMOL/L (ref 96–106)
CO2 SERPL-SCNC: 21 MMOL/L (ref 20–29)
CREAT SERPL-MCNC: 0.76 MG/DL (ref 0.57–1)
EGFR GENE MUT ANL BLD/T: 111 ML/MIN/1.73
EOSINOPHIL # BLD AUTO: 0.3 X10E3/UL (ref 0–0.4)
EOSINOPHIL NFR BLD AUTO: 4 %
ERYTHROCYTE [DISTWIDTH] IN BLOOD BY AUTOMATED COUNT: 23.9 % (ref 11.7–15.4)
FERRITIN SERPL-MCNC: 54 NG/ML (ref 15–150)
GLOBULIN SER CALC-MCNC: 2.5 G/DL (ref 1.5–4.5)
GLUCOSE SERPL-MCNC: 82 MG/DL (ref 65–99)
HCT VFR BLD AUTO: 38.7 % (ref 34–46.6)
HGB BLD-MCNC: 11.2 G/DL (ref 11.1–15.9)
IMM GRANULOCYTES # BLD AUTO: 0 X10E3/UL (ref 0–0.1)
IMM GRANULOCYTES NFR BLD AUTO: 0 %
IRON SATN MFR SERPL: 5 % (ref 15–55)
IRON SERPL-MCNC: 18 UG/DL (ref 27–159)
LYMPHOCYTES # BLD AUTO: 2.6 X10E3/UL (ref 0.7–3.1)
LYMPHOCYTES NFR BLD AUTO: 31 %
MCH RBC QN AUTO: 21.4 PG (ref 26.6–33)
MCHC RBC AUTO-ENTMCNC: 28.9 G/DL (ref 31.5–35.7)
MCV RBC AUTO: 74 FL (ref 79–97)
MONOCYTES # BLD AUTO: 0.6 X10E3/UL (ref 0.1–0.9)
MONOCYTES NFR BLD AUTO: 8 %
MORPHOLOGY BLD-IMP: ABNORMAL
NEUTROPHILS # BLD AUTO: 4.7 X10E3/UL (ref 1.4–7)
NEUTROPHILS NFR BLD AUTO: 57 %
PLATELET # BLD AUTO: 443 X10E3/UL (ref 150–450)
POTASSIUM SERPL-SCNC: 5.3 MMOL/L (ref 3.5–5.2)
PROT SERPL-MCNC: 6.8 G/DL (ref 6–8.5)
RBC # BLD AUTO: 5.23 X10E6/UL (ref 3.77–5.28)
SODIUM SERPL-SCNC: 139 MMOL/L (ref 134–144)
TIBC SERPL-MCNC: 359 UG/DL (ref 250–450)
TSH SERPL DL<=0.005 MIU/L-ACNC: 2.02 UIU/ML (ref 0.45–4.5)
UIBC SERPL-MCNC: 341 UG/DL (ref 131–425)
VIT B12 SERPL-MCNC: 364 PG/ML (ref 232–1245)
WBC # BLD AUTO: 8.3 X10E3/UL (ref 3.4–10.8)

## 2022-03-10 NOTE — PROGRESS NOTES
Please call the patient regarding her lab results.  Patient is not anemic at this point.  Labs are stable.  Vitamin B12 is on the low end of normal.  Would recommend she resume once monthly vitamin B12 injections.  If she is agreeable you may place order.  Thank you.

## 2022-04-21 ENCOUNTER — OFFICE VISIT (OUTPATIENT)
Dept: FAMILY MEDICINE CLINIC | Facility: CLINIC | Age: 27
End: 2022-04-21

## 2022-04-21 VITALS
DIASTOLIC BLOOD PRESSURE: 88 MMHG | TEMPERATURE: 98.3 F | HEIGHT: 64 IN | HEART RATE: 130 BPM | WEIGHT: 234.4 LBS | SYSTOLIC BLOOD PRESSURE: 136 MMHG | OXYGEN SATURATION: 96 % | BODY MASS INDEX: 40.02 KG/M2

## 2022-04-21 DIAGNOSIS — E53.8 B12 DEFICIENCY: ICD-10-CM

## 2022-04-21 DIAGNOSIS — F41.9 ANXIETY: ICD-10-CM

## 2022-04-21 DIAGNOSIS — F31.81 BIPOLAR 2 DISORDER: ICD-10-CM

## 2022-04-21 DIAGNOSIS — G43.809 OTHER MIGRAINE WITHOUT STATUS MIGRAINOSUS, NOT INTRACTABLE: Primary | ICD-10-CM

## 2022-04-21 PROCEDURE — 96372 THER/PROPH/DIAG INJ SC/IM: CPT | Performed by: NURSE PRACTITIONER

## 2022-04-21 PROCEDURE — 99214 OFFICE O/P EST MOD 30 MIN: CPT | Performed by: NURSE PRACTITIONER

## 2022-04-21 RX ORDER — CYANOCOBALAMIN 1000 UG/ML
1000 INJECTION, SOLUTION INTRAMUSCULAR; SUBCUTANEOUS
Status: SHIPPED | OUTPATIENT
Start: 2022-04-21

## 2022-04-21 RX ORDER — RIZATRIPTAN BENZOATE 10 MG/1
10 TABLET, ORALLY DISINTEGRATING ORAL ONCE AS NEEDED
Qty: 9 TABLET | Refills: 2 | Status: SHIPPED | OUTPATIENT
Start: 2022-04-21

## 2022-04-21 RX ORDER — ECHINACEA PURPUREA EXTRACT 125 MG
1 TABLET ORAL AS NEEDED
Qty: 104 ML | Refills: 12 | Status: SHIPPED | OUTPATIENT
Start: 2022-04-21

## 2022-04-21 RX ORDER — KETOROLAC TROMETHAMINE 30 MG/ML
60 INJECTION, SOLUTION INTRAMUSCULAR; INTRAVENOUS ONCE
Status: COMPLETED | OUTPATIENT
Start: 2022-04-21 | End: 2022-04-21

## 2022-04-21 RX ADMIN — KETOROLAC TROMETHAMINE 60 MG: 30 INJECTION, SOLUTION INTRAMUSCULAR; INTRAVENOUS at 15:56

## 2022-04-21 NOTE — PROGRESS NOTES
"Chief Complaint  Headache (Migraine  for the last few days)    Subjective          Alicia Powell presents to [facility]  History of Present Illness     Patient is here today with complaint of migraine for last 2 days.  States she thinks she had last month.    Hasn't ever had previously     OTC: ibuprofen (worked, but made kidney's hurt), aspirin today (helped)    Denies change of vision, reports vertigo, sensitivity to sound and light, nausea,       She also wants to talk about current medications.    She states her mom is worried about a possible \"sleep apnea\"  States it makes her nose \"close up and can't breath out of it\"  Feels like mood is well controlled with these medications.    Mumbles in sleep  Talks in sleep.      Also asking for b12 injection today      Objective   Vital Signs:   /88   Pulse (!) 130   Temp 98.3 °F (36.8 °C)   Ht 162.6 cm (64\")   Wt 106 kg (234 lb 6.4 oz)   SpO2 96%   BMI 40.23 kg/m²            Physical Exam  Constitutional:       Appearance: Normal appearance.   Neurological:      Mental Status: She is alert and oriented to person, place, and time.   Psychiatric:         Mood and Affect: Mood normal.         Behavior: Behavior normal.         Thought Content: Thought content normal.         Judgment: Judgment normal.        Result Review :{Labs  Result Review  Imaging  Med Tab  Media  Procedures :23}                 Assessment and Plan    Diagnoses and all orders for this visit:    1. Other migraine without status migrainosus, not intractable (Primary)  -     ketorolac (TORADOL) injection 60 mg    2. Anxiety    3. Bipolar 2 disorder (HCC)    4. B12 deficiency  -     cyanocobalamin injection 1,000 mcg    Other orders  -     sodium chloride (Ocean Nasal Spray) 0.65 % nasal spray; 1 spray into the nostril(s) as directed by provider As Needed for Congestion.  Dispense: 104 mL; Refill: 12  -     rizatriptan MLT (Maxalt-MLT) 10 MG disintegrating " tablet; Place 1 tablet on the tongue 1 (One) Time As Needed for Migraine for up to 1 dose. May repeat in 2 hours if needed  Dispense: 9 tablet; Refill: 2      Will give Toradol injection for migraine.  Use Maxalt as needed.  I discussed to continue medication for anxiety and bipolar as demonstrating good control and add on Ocean nasal spray.  If continued issues with side effects we can discuss options of changing medicine.  She verbalized understanding and is agreeable.    Follow-up in 1 month, sooner if needed for migraines.  Also discussed with patient creating headache diary and sample given in after visit summary.      Follow Up   Return in about 4 weeks (around 5/19/2022) for Annual physical-follow up on medication.  Patient was given instructions and counseling regarding her condition or for health maintenance advice. Please see specific information pulled into the AVS if appropriate.

## 2022-05-11 ENCOUNTER — CLINICAL SUPPORT (OUTPATIENT)
Dept: FAMILY MEDICINE CLINIC | Facility: CLINIC | Age: 27
End: 2022-05-11

## 2022-05-11 DIAGNOSIS — E53.8 B12 DEFICIENCY: ICD-10-CM

## 2022-05-11 PROCEDURE — 96372 THER/PROPH/DIAG INJ SC/IM: CPT | Performed by: PHYSICIAN ASSISTANT

## 2022-05-11 RX ADMIN — CYANOCOBALAMIN 1000 MCG: 1000 INJECTION, SOLUTION INTRAMUSCULAR; SUBCUTANEOUS at 11:23

## 2022-06-16 ENCOUNTER — CLINICAL SUPPORT (OUTPATIENT)
Dept: FAMILY MEDICINE CLINIC | Facility: CLINIC | Age: 27
End: 2022-06-16

## 2022-06-16 DIAGNOSIS — E53.8 B12 DEFICIENCY: Primary | ICD-10-CM

## 2022-06-16 PROCEDURE — 96372 THER/PROPH/DIAG INJ SC/IM: CPT | Performed by: NURSE PRACTITIONER

## 2022-06-16 RX ADMIN — CYANOCOBALAMIN 1000 MCG: 1000 INJECTION, SOLUTION INTRAMUSCULAR; SUBCUTANEOUS at 11:44

## 2022-07-11 RX ORDER — VENLAFAXINE HYDROCHLORIDE 75 MG/1
CAPSULE, EXTENDED RELEASE ORAL
Qty: 270 CAPSULE | Refills: 0 | OUTPATIENT
Start: 2022-07-11

## 2022-10-18 RX ORDER — QUETIAPINE FUMARATE 200 MG/1
200 TABLET, FILM COATED ORAL NIGHTLY
Qty: 30 TABLET | Refills: 0 | Status: SHIPPED | OUTPATIENT
Start: 2022-10-18 | End: 2022-11-09

## 2022-10-18 NOTE — TELEPHONE ENCOUNTER
.    Caller: LISANDRA CHAHAL    Relationship: PATIENT    Best call back number: [phone]    Requested Prescriptions:   Requested Prescriptions     Pending Prescriptions Disp Refills   • QUEtiapine (SEROquel) 200 MG tablet 90 tablet 1     Sig: Take 1 tablet by mouth Every Night.        Pharmacy where request should be sent: [facility] [address] [facility], [place] - [address] [facility] [phone] [facility] [phone] FX     Additional details provided by patient: PATIENT STATES SHE IS COMPLETELY OUT OF THE ABOVE MEDICATION.    Does the patient have less than a 3 day supply:  [x] Yes  [] No    Amparo Louis, RegSched Rep   10/18/22 09:18 EDT     PLEASE ADVISE.

## 2022-11-03 RX ORDER — VENLAFAXINE HYDROCHLORIDE 150 MG/1
CAPSULE, EXTENDED RELEASE ORAL
Qty: 30 CAPSULE | Refills: 0 | OUTPATIENT
Start: 2022-11-03

## 2022-11-04 RX ORDER — VENLAFAXINE HYDROCHLORIDE 75 MG/1
225 CAPSULE, EXTENDED RELEASE ORAL DAILY
Qty: 90 CAPSULE | Refills: 0 | Status: SHIPPED | OUTPATIENT
Start: 2022-11-04 | End: 2022-11-30 | Stop reason: SDUPTHER

## 2022-11-04 RX ORDER — QUETIAPINE FUMARATE 200 MG/1
200 TABLET, FILM COATED ORAL NIGHTLY
Qty: 30 TABLET | Refills: 0 | Status: CANCELLED | OUTPATIENT
Start: 2022-11-04

## 2022-11-04 NOTE — TELEPHONE ENCOUNTER
Caller: Alicia Powell    Relationship: Self    Best call back number: [phone]    Requested Prescriptions:   Requested Prescriptions     Pending Prescriptions Disp Refills   • venlafaxine XR (EFFEXOR-XR) 75 MG 24 hr capsule 270 capsule 1     Sig: Take 3 capsules by mouth Daily.   • QUEtiapine (SEROquel) 200 MG tablet 30 tablet 0     Sig: Take 1 tablet by mouth Every Night.        Pharmacy where request should be sent: [facility] - [place], [place] - [address] [facility] [phone] [facility] [phone] FX     Additional details provided by patient: COMPLETELY OUT OF EFFEXOR.     Does the patient have less than a 3 day supply:  [x] Yes  [] No    Zachary Marti Rep   11/04/22 14:19 EDT

## 2022-11-08 NOTE — TELEPHONE ENCOUNTER
PATIENT CALLING TO SEE IF SHE CAN GET A REFILL TO LAST UNTIL HER APPOINTMENT PATIENT COMPLETELY OUT OF MEDICATION PLEASE CALL AND ADVISE     CALLBACK # [phone]

## 2022-11-09 RX ORDER — QUETIAPINE FUMARATE 200 MG/1
TABLET, FILM COATED ORAL
Qty: 14 TABLET | Refills: 0 | Status: SHIPPED | OUTPATIENT
Start: 2022-11-09 | End: 2022-11-30 | Stop reason: SDUPTHER

## 2022-11-14 RX ORDER — QUETIAPINE FUMARATE 200 MG/1
TABLET, FILM COATED ORAL
Qty: 14 TABLET | Refills: 0 | OUTPATIENT
Start: 2022-11-14

## 2022-11-30 ENCOUNTER — OFFICE VISIT (OUTPATIENT)
Dept: FAMILY MEDICINE CLINIC | Facility: CLINIC | Age: 27
End: 2022-11-30

## 2022-11-30 VITALS
HEIGHT: 64 IN | TEMPERATURE: 98.2 F | HEART RATE: 124 BPM | WEIGHT: 245 LBS | SYSTOLIC BLOOD PRESSURE: 128 MMHG | DIASTOLIC BLOOD PRESSURE: 88 MMHG | BODY MASS INDEX: 41.83 KG/M2 | OXYGEN SATURATION: 99 %

## 2022-11-30 DIAGNOSIS — R00.0 TACHYCARDIA: ICD-10-CM

## 2022-11-30 DIAGNOSIS — L98.9 SKIN LESIONS: ICD-10-CM

## 2022-11-30 DIAGNOSIS — F31.81 BIPOLAR 2 DISORDER: ICD-10-CM

## 2022-11-30 DIAGNOSIS — E53.8 B12 DEFICIENCY: Primary | ICD-10-CM

## 2022-11-30 DIAGNOSIS — R00.2 HEART PALPITATIONS: ICD-10-CM

## 2022-11-30 DIAGNOSIS — R63.1 EXCESSIVE THIRST: ICD-10-CM

## 2022-11-30 DIAGNOSIS — G43.809 OTHER MIGRAINE WITHOUT STATUS MIGRAINOSUS, NOT INTRACTABLE: ICD-10-CM

## 2022-11-30 DIAGNOSIS — M25.50 ARTHRALGIA, UNSPECIFIED JOINT: ICD-10-CM

## 2022-11-30 DIAGNOSIS — F41.9 ANXIETY: ICD-10-CM

## 2022-11-30 PROCEDURE — 99215 OFFICE O/P EST HI 40 MIN: CPT | Performed by: NURSE PRACTITIONER

## 2022-11-30 RX ORDER — CLINDAMYCIN PHOSPHATE 10 MG/ML
1 SOLUTION TOPICAL EVERY 12 HOURS SCHEDULED
Qty: 60 EACH | Refills: 2 | Status: SHIPPED | OUTPATIENT
Start: 2022-11-30

## 2022-11-30 RX ORDER — QUETIAPINE FUMARATE 200 MG/1
200 TABLET, FILM COATED ORAL NIGHTLY
Qty: 30 TABLET | Refills: 5 | Status: SHIPPED | OUTPATIENT
Start: 2022-11-30

## 2022-11-30 RX ORDER — VENLAFAXINE HYDROCHLORIDE 75 MG/1
225 CAPSULE, EXTENDED RELEASE ORAL DAILY
Qty: 90 CAPSULE | Refills: 5 | Status: SHIPPED | OUTPATIENT
Start: 2022-11-30

## 2022-11-30 NOTE — PROGRESS NOTES
"Chief Complaint  Med Refill (Wants to discuss disability ad ptos), Anxiety, and Depression    Subjective        Alicia Powell presents to [facility]  History of Present Illness     Patient is here today for follow up on medications.   She also has several new complaints to me today.      Mood/anxiety- effexor working well.  Feels like she has issues with seroquel.  Noses closes up, , when misses dose she gets hot sweats.    Working well for her mood.  Doesn't want to change currently    States in next couple years she wants to get pregnant in next couple years and wants to make sure this medication isn't stopping her from getting pregnant.     States had symptoms since kid.    States that she is worried about POTS  States that she is always thirsty, tachycardic, dizziness, body aches, joint pain.  Joint pain has been severe for last month or more.  Is generalized.     Has palpitations and feels super fast now and then with heart rate.      Smokes marijuana to help with it.     Has spots on groin bilaterally that she is concerned about being infection.        Objective   Vital Signs:  /88   Pulse (!) 124   Temp 98.2 °F (36.8 °C)   Ht 162.6 cm (64\")   Wt 111 kg (245 lb)   SpO2 99%   BMI 42.05 kg/m²   Estimated body mass index is 42.05 kg/m² as calculated from the following:    Height as of this encounter: 162.6 cm (64\").    Weight as of this encounter: 111 kg (245 lb).          Physical Exam  Constitutional:       Appearance: Normal appearance.   Cardiovascular:      Rate and Rhythm: Normal rate and regular rhythm.      Pulses: Normal pulses.   Pulmonary:      Effort: Pulmonary effort is normal.      Breath sounds: Normal breath sounds.   Skin:     General: Skin is warm and dry.      Comments: Nodules, purple, bilateral thighs   Neurological:      Mental Status: She is alert.   Psychiatric:         Mood and Affect: Mood is anxious.         Speech: Speech is rapid and " pressured.         Behavior: Behavior normal.         Thought Content: Thought content normal.         Judgment: Judgment normal.        Result Review :                Assessment and Plan   Diagnoses and all orders for this visit:    1. B12 deficiency (Primary)  -     Vitamin B12    2. Other migraine without status migrainosus, not intractable    3. Anxiety    4. Bipolar 2 disorder (HCC)    5. Arthralgia, unspecified joint  -     Rheumatoid Factor  -     Ambulatory Referral to Dermatology  -     [facility] Direct Reflex to [facility]  -     Cyclic citrul peptide antibody, IgG/IgA  -     CK  -     C-reactive protein  -     Sedimentation rate    6. Tachycardia  -     CBC & Differential  -     Comprehensive Metabolic Panel  -     TSH  -     Holter monitor - 48 hour; Future    7. Skin lesions    8. Excessive thirst  -     CBC & Differential  -     Comprehensive Metabolic Panel  -     TSH  -     Hemoglobin A1c    9. Heart palpitations  -     CBC & Differential  -     Comprehensive Metabolic Panel  -     TSH  -     Holter monitor - 48 hour; Future    Other orders  -     venlafaxine XR (EFFEXOR-XR) 75 MG 24 hr capsule; Take 3 capsules by mouth Daily.  Dispense: 90 capsule; Refill: 5  -     QUEtiapine (SEROquel) 200 MG tablet; Take 1 tablet by mouth Every Night.  Dispense: 30 tablet; Refill: 5  -     clindamycin (CLEOCIN T) 1 % swab; Apply 1 application topically to the appropriate area as directed Every 12 (Twelve) Hours.  Dispense: 60 each; Refill: 2    vitamin b12 def-this this can cause some symptoms.  We will recheck labs today.    Anxiety and bipolar-I offered him another medication in place of Seroquel but she does not want to try currently.  We will continue current medications.  If any worsening mood or side effects notify provider    Arthralgias-we will check arthritis and autoimmune panel.  Will call with results any changes needed plan of care    Discussed with patient that tachycardia and excessive thirst can be  related to a variety of reasons.  We will check labs today.  We will also order Holter monitor for further evaluation.  If any chest pain go to the ER.    Skin lesions-trial clindamycin swabs.  Referral made to dermatologist for further evaluation.      Time spent on visit was 45 minutes.         Follow Up   No follow-ups on file.  Patient was given instructions and counseling regarding her condition or for health maintenance advice. Please see specific information pulled into the AVS if appropriate.

## 2022-12-04 LAB
ALBUMIN SERPL-MCNC: 4.1 G/DL (ref 3.9–5)
ALBUMIN/GLOB SERPL: 1.5 {RATIO} (ref 1.2–2.2)
ALP SERPL-CCNC: 90 IU/L (ref 44–121)
ALT SERPL-CCNC: 22 IU/L (ref 0–32)
ANA SER QL: NEGATIVE
AST SERPL-CCNC: 19 IU/L (ref 0–40)
BASOPHILS # BLD AUTO: 0.1 X10E3/UL (ref 0–0.2)
BASOPHILS NFR BLD AUTO: 1 %
BILIRUB SERPL-MCNC: <0.2 MG/DL (ref 0–1.2)
BUN SERPL-MCNC: 11 MG/DL (ref 6–20)
BUN/CREAT SERPL: 15 (ref 9–23)
CALCIUM SERPL-MCNC: 9.3 MG/DL (ref 8.7–10.2)
CCP IGA+IGG SERPL IA-ACNC: 5 UNITS (ref 0–19)
CHLORIDE SERPL-SCNC: 105 MMOL/L (ref 96–106)
CK SERPL-CCNC: 124 U/L (ref 32–182)
CO2 SERPL-SCNC: 24 MMOL/L (ref 20–29)
CREAT SERPL-MCNC: 0.72 MG/DL (ref 0.57–1)
CRP SERPL-MCNC: 3 MG/L (ref 0–10)
EGFRCR SERPLBLD CKD-EPI 2021: 118 ML/MIN/1.73
EOSINOPHIL # BLD AUTO: 0.3 X10E3/UL (ref 0–0.4)
EOSINOPHIL NFR BLD AUTO: 3 %
ERYTHROCYTE [DISTWIDTH] IN BLOOD BY AUTOMATED COUNT: 13.8 % (ref 11.7–15.4)
ERYTHROCYTE [SEDIMENTATION RATE] IN BLOOD BY WESTERGREN METHOD: 15 MM/HR (ref 0–32)
GLOBULIN SER CALC-MCNC: 2.7 G/DL (ref 1.5–4.5)
GLUCOSE SERPL-MCNC: 81 MG/DL (ref 70–99)
HBA1C MFR BLD: 5.1 % (ref 4.8–5.6)
HCT VFR BLD AUTO: 38.5 % (ref 34–46.6)
HGB BLD-MCNC: 11.9 G/DL (ref 11.1–15.9)
IMM GRANULOCYTES # BLD AUTO: 0 X10E3/UL (ref 0–0.1)
IMM GRANULOCYTES NFR BLD AUTO: 0 %
LYMPHOCYTES # BLD AUTO: 3 X10E3/UL (ref 0.7–3.1)
LYMPHOCYTES NFR BLD AUTO: 32 %
MCH RBC QN AUTO: 26 PG (ref 26.6–33)
MCHC RBC AUTO-ENTMCNC: 30.9 G/DL (ref 31.5–35.7)
MCV RBC AUTO: 84 FL (ref 79–97)
MONOCYTES # BLD AUTO: 0.8 X10E3/UL (ref 0.1–0.9)
MONOCYTES NFR BLD AUTO: 8 %
NEUTROPHILS # BLD AUTO: 5.2 X10E3/UL (ref 1.4–7)
NEUTROPHILS NFR BLD AUTO: 56 %
PLATELET # BLD AUTO: 411 X10E3/UL (ref 150–450)
POTASSIUM SERPL-SCNC: 4.8 MMOL/L (ref 3.5–5.2)
PROT SERPL-MCNC: 6.8 G/DL (ref 6–8.5)
RBC # BLD AUTO: 4.57 X10E6/UL (ref 3.77–5.28)
RHEUMATOID FACT SERPL-ACNC: <10 IU/ML
SODIUM SERPL-SCNC: 142 MMOL/L (ref 134–144)
TSH SERPL DL<=0.005 MIU/L-ACNC: 1.5 UIU/ML (ref 0.45–4.5)
VIT B12 SERPL-MCNC: 357 PG/ML (ref 232–1245)
WBC # BLD AUTO: 9.3 X10E3/UL (ref 3.4–10.8)

## 2022-12-12 RX ORDER — QUETIAPINE FUMARATE 200 MG/1
TABLET, FILM COATED ORAL
Qty: 30 TABLET | Refills: 0 | OUTPATIENT
Start: 2022-12-12

## 2022-12-19 DIAGNOSIS — R00.2 HEART PALPITATIONS: ICD-10-CM

## 2022-12-19 DIAGNOSIS — R00.0 TACHYCARDIA: ICD-10-CM

## 2023-05-30 RX ORDER — QUETIAPINE FUMARATE 200 MG/1
TABLET, FILM COATED ORAL
Qty: 30 TABLET | Refills: 0 | Status: SHIPPED | OUTPATIENT
Start: 2023-05-30

## 2023-11-01 RX ORDER — VENLAFAXINE HYDROCHLORIDE 75 MG/1
CAPSULE, EXTENDED RELEASE ORAL
Qty: 270 CAPSULE | Refills: 0 | Status: SHIPPED | OUTPATIENT
Start: 2023-11-01

## 2024-03-15 RX ORDER — VENLAFAXINE HYDROCHLORIDE 75 MG/1
CAPSULE, EXTENDED RELEASE ORAL
Qty: 90 CAPSULE | Refills: 0 | Status: SHIPPED | OUTPATIENT
Start: 2024-03-15

## 2024-05-03 RX ORDER — VENLAFAXINE HYDROCHLORIDE 75 MG/1
CAPSULE, EXTENDED RELEASE ORAL
Qty: 90 CAPSULE | Refills: 0 | OUTPATIENT
Start: 2024-05-03

## (undated) DEVICE — Device: Brand: DEFENDO AIR/WATER/SUCTION AND BIOPSY VALVE

## (undated) DEVICE — TBG 02 CRUSH RESIST LF CLR 7FT

## (undated) DEVICE — FRCP BX RADJAW4 NDL 2.8 240CM LG OG BX40

## (undated) DEVICE — BITEBLOCK OMNI BLOC

## (undated) DEVICE — CANN NASL CO2 TRULINK W/O2 A/

## (undated) DEVICE — TUBING, SUCTION, 1/4" X 10', STRAIGHT: Brand: MEDLINE